# Patient Record
Sex: FEMALE | Race: WHITE | Employment: OTHER | RURAL
[De-identification: names, ages, dates, MRNs, and addresses within clinical notes are randomized per-mention and may not be internally consistent; named-entity substitution may affect disease eponyms.]

---

## 2017-01-12 RX ORDER — HYDROCODONE BITARTRATE AND ACETAMINOPHEN 5; 325 MG/1; MG/1
1 TABLET ORAL EVERY 6 HOURS PRN
Qty: 90 TABLET | Refills: 0 | Status: SHIPPED | OUTPATIENT
Start: 2017-01-12 | End: 2017-01-23

## 2017-01-13 ENCOUNTER — OUTSIDE SERVICES (OUTPATIENT)
Dept: PRIMARY CARE CLINIC | Age: 82
End: 2017-01-13
Payer: MEDICARE

## 2017-01-13 DIAGNOSIS — S72.92XD CLOSED FRACTURE OF LEFT FEMUR WITH ROUTINE HEALING, UNSPECIFIED FRACTURE MORPHOLOGY, UNSPECIFIED PORTION OF FEMUR, SUBSEQUENT ENCOUNTER: ICD-10-CM

## 2017-01-13 DIAGNOSIS — I10 ESSENTIAL HYPERTENSION: ICD-10-CM

## 2017-01-13 DIAGNOSIS — R53.81 DECLINING FUNCTIONAL STATUS: Primary | ICD-10-CM

## 2017-01-13 DIAGNOSIS — I25.10 CORONARY ARTERY DISEASE INVOLVING NATIVE HEART WITHOUT ANGINA PECTORIS, UNSPECIFIED VESSEL OR LESION TYPE: ICD-10-CM

## 2017-01-13 DIAGNOSIS — S82.401D TIBIA/FIBULA FRACTURE, RIGHT, CLOSED, WITH ROUTINE HEALING, SUBSEQUENT ENCOUNTER: ICD-10-CM

## 2017-01-13 DIAGNOSIS — J44.9 CHRONIC OBSTRUCTIVE PULMONARY DISEASE, UNSPECIFIED COPD TYPE (HCC): ICD-10-CM

## 2017-01-13 DIAGNOSIS — S82.201D TIBIA/FIBULA FRACTURE, RIGHT, CLOSED, WITH ROUTINE HEALING, SUBSEQUENT ENCOUNTER: ICD-10-CM

## 2017-01-13 DIAGNOSIS — C44.300 SKIN CANCER OF FACE: ICD-10-CM

## 2017-01-13 PROCEDURE — 99305 1ST NF CARE MODERATE MDM 35: CPT | Performed by: INTERNAL MEDICINE

## 2017-01-23 RX ORDER — HYDROCODONE BITARTRATE AND ACETAMINOPHEN 10; 325 MG/1; MG/1
1 TABLET ORAL EVERY 6 HOURS PRN
Qty: 90 TABLET | Refills: 0 | Status: SHIPPED | OUTPATIENT
Start: 2017-01-23 | End: 2017-02-15 | Stop reason: SDUPTHER

## 2017-02-15 RX ORDER — HYDROCODONE BITARTRATE AND ACETAMINOPHEN 10; 325 MG/1; MG/1
1 TABLET ORAL EVERY 6 HOURS PRN
Qty: 90 TABLET | Refills: 0 | Status: SHIPPED | OUTPATIENT
Start: 2017-02-15 | End: 2017-02-16 | Stop reason: SDUPTHER

## 2017-02-16 RX ORDER — HYDROCODONE BITARTRATE AND ACETAMINOPHEN 10; 325 MG/1; MG/1
1 TABLET ORAL EVERY 6 HOURS PRN
Qty: 90 TABLET | Refills: 0 | Status: SHIPPED | OUTPATIENT
Start: 2017-02-16 | End: 2017-04-10 | Stop reason: SDUPTHER

## 2017-02-24 ENCOUNTER — OUTSIDE SERVICES (OUTPATIENT)
Dept: PRIMARY CARE CLINIC | Age: 82
End: 2017-02-24
Payer: MEDICARE

## 2017-02-24 DIAGNOSIS — S72.92XD CLOSED FRACTURE OF LEFT FEMUR WITH ROUTINE HEALING, UNSPECIFIED FRACTURE MORPHOLOGY, UNSPECIFIED PORTION OF FEMUR, SUBSEQUENT ENCOUNTER: ICD-10-CM

## 2017-02-24 DIAGNOSIS — R53.81 DECLINING FUNCTIONAL STATUS: Primary | ICD-10-CM

## 2017-02-24 DIAGNOSIS — S82.201D TIBIA/FIBULA FRACTURE, RIGHT, CLOSED, WITH ROUTINE HEALING, SUBSEQUENT ENCOUNTER: ICD-10-CM

## 2017-02-24 DIAGNOSIS — I10 ESSENTIAL HYPERTENSION: ICD-10-CM

## 2017-02-24 DIAGNOSIS — S82.401D TIBIA/FIBULA FRACTURE, RIGHT, CLOSED, WITH ROUTINE HEALING, SUBSEQUENT ENCOUNTER: ICD-10-CM

## 2017-02-24 PROCEDURE — 99308 SBSQ NF CARE LOW MDM 20: CPT | Performed by: INTERNAL MEDICINE

## 2017-03-02 ENCOUNTER — OFFICE VISIT (OUTPATIENT)
Dept: SURGERY | Facility: CLINIC | Age: 82
End: 2017-03-02

## 2017-03-02 VITALS
WEIGHT: 103 LBS | TEMPERATURE: 97.9 F | HEART RATE: 62 BPM | DIASTOLIC BLOOD PRESSURE: 61 MMHG | HEIGHT: 65 IN | SYSTOLIC BLOOD PRESSURE: 105 MMHG | BODY MASS INDEX: 17.16 KG/M2

## 2017-03-02 DIAGNOSIS — D49.2 SKIN NEOPLASM: Primary | ICD-10-CM

## 2017-03-02 PROCEDURE — 99203 OFFICE O/P NEW LOW 30 MIN: CPT | Performed by: SURGERY

## 2017-03-02 RX ORDER — IPRATROPIUM BROMIDE AND ALBUTEROL SULFATE 2.5; .5 MG/3ML; MG/3ML
SOLUTION RESPIRATORY (INHALATION) EVERY 4 HOURS PRN
COMMUNITY
Start: 2014-12-29

## 2017-03-02 RX ORDER — GABAPENTIN 800 MG/1
TABLET ORAL
Refills: 2 | COMMUNITY
Start: 2016-12-12 | End: 2021-04-30 | Stop reason: SDUPTHER

## 2017-03-02 RX ORDER — CLOPIDOGREL BISULFATE 75 MG/1
TABLET ORAL
Refills: 5 | COMMUNITY
Start: 2016-12-12

## 2017-03-02 RX ORDER — SIMVASTATIN 20 MG
TABLET ORAL
COMMUNITY
Start: 2015-04-23

## 2017-03-02 RX ORDER — ONDANSETRON 4 MG/1
TABLET, FILM COATED ORAL EVERY 8 HOURS
COMMUNITY
Start: 2016-03-01 | End: 2017-04-21

## 2017-03-02 RX ORDER — HYDROCODONE BITARTRATE AND ACETAMINOPHEN 10; 325 MG/1; MG/1
TABLET ORAL EVERY 6 HOURS
COMMUNITY
Start: 2017-02-16 | End: 2021-04-30 | Stop reason: SDUPTHER

## 2017-03-02 NOTE — PROGRESS NOTES
Reason for Consultation: Lesion      Chief complaint   Chief Complaint   Patient presents with   • Skin Lesion     Pt here for evaluation skin cancer on left temple       Subjective .     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  It is rather large and ulcerated pigmentation.  No palpable neck lymph nodes.  Patient denies pain in the area.  No history of skin cancer in the past.    Review of Systems   Constitutional: Negative for chills, fever and unexpected weight change.   HENT: Negative for hearing loss, trouble swallowing and voice change.    Eyes: Negative for visual disturbance.   Respiratory: Positive for shortness of breath. Negative for apnea, cough, chest tightness and wheezing.    Cardiovascular: Negative for chest pain, palpitations and leg swelling.   Gastrointestinal: Negative for abdominal distention, abdominal pain, anal bleeding, blood in stool, constipation, diarrhea, nausea, rectal pain and vomiting.   Endocrine: Negative for cold intolerance and heat intolerance.   Genitourinary: Negative for difficulty urinating, dysuria and flank pain.   Musculoskeletal: Negative for back pain and gait problem.   Skin: Negative for color change, rash and wound.   Neurological: Negative for dizziness, syncope, speech difficulty, weakness, light-headedness, numbness and headaches.   Hematological: Negative for adenopathy. Does not bruise/bleed easily.   Psychiatric/Behavioral: Negative for confusion. The patient is not nervous/anxious.        History  Past Medical History   Diagnosis Date   • Cancer    • Hypertension    • Myocardial infarction    • Stroke           Past Surgical History   Procedure Laterality Date   • Colon surgery     • Leg surgery            Family History   Problem Relation Age of Onset   • Cancer Brother      prostate          Social History   Substance Use  Topics   • Smoking status: Former Smoker   • Smokeless tobacco: Never Used   • Alcohol use No    and Allergies:  Iodine    Objective     Vital Signs   Temp:  [97.9 °F (36.6 °C)] 97.9 °F (36.6 °C)  Heart Rate:  [62] 62  BP: (105)/(61) 105/61    Physical Exam:     General Appearance:    Alert, cooperative, in no acute distress   Head:    Normocephalic, without obvious abnormality, atraumatic.  Large ulcerated mass left temple.  Measures 6 x 6 cm.     Eyes:            Lids and lashes normal, conjunctivae and sclerae normal, no   icterus, no pallor, corneas clear.   Ears:    Ears appear intact with no abnormalities noted   Throat:   No oral lesions, no thrush, oral mucosa moist   Neck:   No adenopathy, supple, trachea midline, no thyromegaly, no   carotid bruit.   Extremities:   Moves all extremities well, no edema, no cyanosis, no             Redness.    Pulses:   Pulses palpable and equal bilaterally   Skin:   No bleeding, bruising or rash   Lymph nodes:   No palpable adenopathy   Neurologic:   Cranial nerves 2 - 12 grossly intact, sensation intact.       Results Review:   I reviewed the patient's new clinical results.      Assessment/Plan   Biopsy-proven basal cell carcinoma left temple, greater than 6 cm in diameter.  We'll need closure with skin graft.    I did have a detailed and extensive discussion with the patient in the hospital and they understand that they need to undergo excision of a large basal cell cancer on the left temple with full thickness skin graft.. Full risks and benefits of operative versus nonoperative intervention were discussed with the patient and these include bleeding, infection and reccurrence. The patient understands, agrees, and wishes to proceed with the surgical treatment plan as mentioned above. The patient had no questions for me at the end of the discussion.       I discussed the patients findings and my recommendations with patient and consulting provider.        Javier Aguayo,  MD  03/02/17  10:39 AM

## 2017-03-09 ENCOUNTER — TELEPHONE (OUTPATIENT)
Dept: SURGERY | Facility: CLINIC | Age: 82
End: 2017-03-09

## 2017-03-09 NOTE — TELEPHONE ENCOUNTER
----- Message from Cassie Miller sent at 3/8/2017 12:26 PM EST -----  Pt's daughter called needs to reschedule sx out 1-2 weeks.  Problem with her insurance that the daughter is working on.  Please call back to reschedule.  Thanks.

## 2017-03-11 ASSESSMENT — ENCOUNTER SYMPTOMS
BACK PAIN: 0
SINUS PRESSURE: 0
SHORTNESS OF BREATH: 1
ABDOMINAL PAIN: 0
EYE DISCHARGE: 0
COUGH: 1
SORE THROAT: 0
ROS SKIN COMMENTS: AS IN HPI
EYE DISCHARGE: 0
ABDOMINAL PAIN: 0
VOMITING: 0
VOMITING: 0
WHEEZING: 1
COUGH: 1
SHORTNESS OF BREATH: 1
WHEEZING: 1
NAUSEA: 0
SINUS PRESSURE: 0
SORE THROAT: 0
BACK PAIN: 0
NAUSEA: 0

## 2017-03-13 ENCOUNTER — OUTSIDE SERVICES (OUTPATIENT)
Dept: PRIMARY CARE CLINIC | Age: 82
End: 2017-03-13
Payer: MEDICARE

## 2017-03-13 DIAGNOSIS — R53.81 DECLINING FUNCTIONAL STATUS: Primary | ICD-10-CM

## 2017-03-13 DIAGNOSIS — I10 ESSENTIAL HYPERTENSION: ICD-10-CM

## 2017-03-13 DIAGNOSIS — I71.40 ABDOMINAL AORTIC ANEURYSM (AAA) WITHOUT RUPTURE: ICD-10-CM

## 2017-03-13 DIAGNOSIS — E55.9 VITAMIN D DEFICIENCY: ICD-10-CM

## 2017-03-13 DIAGNOSIS — S72.92XD CLOSED FRACTURE OF LEFT FEMUR WITH ROUTINE HEALING, UNSPECIFIED FRACTURE MORPHOLOGY, UNSPECIFIED PORTION OF FEMUR, SUBSEQUENT ENCOUNTER: ICD-10-CM

## 2017-03-13 PROCEDURE — 99308 SBSQ NF CARE LOW MDM 20: CPT | Performed by: INTERNAL MEDICINE

## 2017-03-14 ENCOUNTER — HOSPITAL ENCOUNTER (OUTPATIENT)
Dept: OTHER | Age: 82
Discharge: HOME | End: 2017-03-14

## 2017-03-15 LAB
BACTERIA: ABNORMAL /HPF
BILIRUBIN URINE: NEGATIVE
BLOOD, URINE: ABNORMAL
CLARITY: ABNORMAL
COLOR: YELLOW
EPITHELIAL CELLS, UA: ABNORMAL /HPF
GLUCOSE URINE: NEGATIVE MG/DL
KETONES, URINE: NEGATIVE MG/DL
LEUKOCYTE ESTERASE, URINE: ABNORMAL
MICROSCOPIC EXAMINATION: YES
NITRITE, URINE: NEGATIVE
PH UA: 7
PROTEIN UA: NEGATIVE MG/DL
RBC UA: ABNORMAL /HPF (ref 0–2)
SPECIFIC GRAVITY UA: 1.01
URINE REFLEX TO CULTURE: YES
URINE TYPE: ABNORMAL
UROBILINOGEN, URINE: 0.2 E.U./DL
WBC UA: ABNORMAL /HPF (ref 0–5)

## 2017-03-16 ENCOUNTER — HOSPITAL ENCOUNTER (OUTPATIENT)
Dept: OTHER | Age: 82
Discharge: HOME | End: 2017-03-16

## 2017-03-17 LAB
ORGANISM: ABNORMAL
URINE CULTURE, ROUTINE: ABNORMAL

## 2017-03-19 LAB
GRAM STAIN RESULT: ABNORMAL
ORGANISM: ABNORMAL
WOUND/ABSCESS: ABNORMAL

## 2017-03-27 ENCOUNTER — HOSPITAL ENCOUNTER (OUTPATIENT)
Dept: OTHER | Age: 82
Discharge: HOME | End: 2017-03-27

## 2017-03-27 LAB
A/G RATIO: 1.7 (ref 0.8–2)
ALBUMIN SERPL-MCNC: 3.8 G/DL (ref 3.4–4.8)
ALP BLD-CCNC: 89 U/L (ref 25–100)
ALT SERPL-CCNC: 12 U/L (ref 4–36)
ANION GAP SERPL CALCULATED.3IONS-SCNC: 9 MMOL/L (ref 3–16)
AST SERPL-CCNC: 16 U/L (ref 8–33)
BASOPHILS ABSOLUTE: 0 K/UL (ref 0–0.1)
BASOPHILS RELATIVE PERCENT: 0.4 %
BILIRUB SERPL-MCNC: <0.2 MG/DL (ref 0.3–1.2)
BUN BLDV-MCNC: 13 MG/DL (ref 6–20)
CALCIUM SERPL-MCNC: 9.9 MG/DL (ref 8.5–10.5)
CHLORIDE BLD-SCNC: 101 MMOL/L (ref 98–107)
CHOLESTEROL, TOTAL: 164 MG/DL (ref 0–200)
CO2: 32 MMOL/L (ref 20–30)
CREAT SERPL-MCNC: 0.6 MG/DL (ref 0.4–1.2)
EOSINOPHILS ABSOLUTE: 0.1 K/UL (ref 0–0.4)
EOSINOPHILS RELATIVE PERCENT: 1.5 %
GFR AFRICAN AMERICAN: >59
GFR NON-AFRICAN AMERICAN: >60
GLOBULIN: 2.3 G/DL
GLUCOSE BLD-MCNC: 88 MG/DL (ref 74–106)
HCT VFR BLD CALC: 35 % (ref 37–47)
HDLC SERPL-MCNC: 51 MG/DL (ref 40–60)
HEMOGLOBIN: 11.7 G/DL (ref 11.5–16.5)
LDL CHOLESTEROL CALCULATED: 95 MG/DL
LYMPHOCYTES ABSOLUTE: 2.3 K/UL (ref 1.5–4)
LYMPHOCYTES RELATIVE PERCENT: 50.3 % (ref 20–40)
MCH RBC QN AUTO: 29.9 PG (ref 27–32)
MCHC RBC AUTO-ENTMCNC: 33.4 G/DL (ref 31–35)
MCV RBC AUTO: 89.5 FL (ref 80–100)
MONOCYTES ABSOLUTE: 0.4 K/UL (ref 0.2–0.8)
MONOCYTES RELATIVE PERCENT: 8.8 % (ref 3–10)
NEUTROPHILS ABSOLUTE: 1.8 K/UL (ref 2–7.5)
NEUTROPHILS RELATIVE PERCENT: 39 %
PDW BLD-RTO: 14.5 % (ref 11–16)
PLATELET # BLD: 252 K/UL (ref 150–400)
PMV BLD AUTO: 10.5 FL (ref 6–10)
POTASSIUM SERPL-SCNC: 4.7 MMOL/L (ref 3.4–5.1)
RBC # BLD: 3.91 M/UL (ref 3.8–5.8)
SODIUM BLD-SCNC: 142 MMOL/L (ref 136–145)
TOTAL PROTEIN: 6.1 G/DL (ref 6.4–8.3)
TRIGL SERPL-MCNC: 91 MG/DL (ref 0–249)
TSH SERPL DL<=0.05 MIU/L-ACNC: 4.97 UIU/ML (ref 0.35–5.5)
VITAMIN D 25-HYDROXY: 60 (ref 32–100)
VLDLC SERPL CALC-MCNC: 18 MG/DL
WBC # BLD: 4.5 K/UL (ref 4–11)

## 2017-03-31 ENCOUNTER — HOSPITAL ENCOUNTER (OUTPATIENT)
Dept: OTHER | Age: 82
Discharge: HOME | End: 2017-03-31

## 2017-04-04 LAB
GRAM STAIN RESULT: NORMAL
WOUND/ABSCESS: NORMAL

## 2017-04-10 RX ORDER — HYDROCODONE BITARTRATE AND ACETAMINOPHEN 10; 325 MG/1; MG/1
1 TABLET ORAL EVERY 8 HOURS PRN
Qty: 45 TABLET | Refills: 0 | Status: SHIPPED | OUTPATIENT
Start: 2017-04-10 | End: 2019-01-31

## 2017-04-21 ENCOUNTER — APPOINTMENT (OUTPATIENT)
Dept: PREADMISSION TESTING | Facility: HOSPITAL | Age: 82
End: 2017-04-21

## 2017-04-21 VITALS — HEIGHT: 65 IN | BODY MASS INDEX: 17.16 KG/M2 | WEIGHT: 103 LBS

## 2017-04-25 ENCOUNTER — HOSPITAL ENCOUNTER (OUTPATIENT)
Dept: OTHER | Age: 82
Discharge: HOME | End: 2017-04-25
Attending: NURSE PRACTITIONER | Admitting: NURSE PRACTITIONER

## 2017-04-25 LAB
A/G RATIO: 1.3 (ref 0.8–2)
ALBUMIN SERPL-MCNC: 3.7 G/DL (ref 3.4–4.8)
ALP BLD-CCNC: 132 U/L (ref 25–100)
ALT SERPL-CCNC: 6 U/L (ref 4–36)
ANION GAP SERPL CALCULATED.3IONS-SCNC: 14 MMOL/L (ref 3–16)
AST SERPL-CCNC: 14 U/L (ref 8–33)
BASOPHILS ABSOLUTE: 0 K/UL (ref 0–0.1)
BASOPHILS RELATIVE PERCENT: 0.5 %
BILIRUB SERPL-MCNC: <0.2 MG/DL (ref 0.3–1.2)
BUN BLDV-MCNC: 10 MG/DL (ref 6–20)
CALCIUM SERPL-MCNC: 9.3 MG/DL (ref 8.5–10.5)
CHLORIDE BLD-SCNC: 97 MMOL/L (ref 98–107)
CHOLESTEROL, TOTAL: 179 MG/DL (ref 0–200)
CO2: 28 MMOL/L (ref 20–30)
CREAT SERPL-MCNC: 0.6 MG/DL (ref 0.4–1.2)
EOSINOPHILS ABSOLUTE: 0.1 K/UL (ref 0–0.4)
EOSINOPHILS RELATIVE PERCENT: 0.6 %
FOLATE: >20 NG/ML
GFR AFRICAN AMERICAN: >59
GFR NON-AFRICAN AMERICAN: >60
GLOBULIN: 2.9 G/DL
GLUCOSE BLD-MCNC: 118 MG/DL (ref 74–106)
HCT VFR BLD CALC: 34.9 % (ref 37–47)
HDLC SERPL-MCNC: 62 MG/DL (ref 40–60)
HEMOGLOBIN: 11.5 G/DL (ref 11.5–16.5)
LDL CHOLESTEROL CALCULATED: 93 MG/DL
LYMPHOCYTES ABSOLUTE: 2.7 K/UL (ref 1.5–4)
LYMPHOCYTES RELATIVE PERCENT: 34.9 % (ref 20–40)
MCH RBC QN AUTO: 30.2 PG (ref 27–32)
MCHC RBC AUTO-ENTMCNC: 33 G/DL (ref 31–35)
MCV RBC AUTO: 91.6 FL (ref 80–100)
MONOCYTES ABSOLUTE: 0.6 K/UL (ref 0.2–0.8)
MONOCYTES RELATIVE PERCENT: 7.9 % (ref 3–10)
NEUTROPHILS ABSOLUTE: 4.4 K/UL (ref 2–7.5)
NEUTROPHILS RELATIVE PERCENT: 56.1 %
PDW BLD-RTO: 14.9 % (ref 11–16)
PLATELET # BLD: 365 K/UL (ref 150–400)
PMV BLD AUTO: 10.6 FL (ref 6–10)
POTASSIUM SERPL-SCNC: 3.7 MMOL/L (ref 3.4–5.1)
RBC # BLD: 3.81 M/UL (ref 3.8–5.8)
SODIUM BLD-SCNC: 139 MMOL/L (ref 136–145)
TOTAL PROTEIN: 6.6 G/DL (ref 6.4–8.3)
TRIGL SERPL-MCNC: 122 MG/DL (ref 0–249)
TSH SERPL DL<=0.05 MIU/L-ACNC: 2.85 UIU/ML (ref 0.35–5.5)
VITAMIN B-12: 346 PG/ML (ref 211–911)
VLDLC SERPL CALC-MCNC: 24 MG/DL
WBC # BLD: 7.8 K/UL (ref 4–11)

## 2017-04-28 ENCOUNTER — HOSPITAL ENCOUNTER (OUTPATIENT)
Facility: HOSPITAL | Age: 82
Setting detail: HOSPITAL OUTPATIENT SURGERY
Discharge: HOME | End: 2017-04-28
Attending: SURGERY | Admitting: SURGERY

## 2017-04-28 ENCOUNTER — ANESTHESIA EVENT (OUTPATIENT)
Dept: PERIOP | Facility: HOSPITAL | Age: 82
End: 2017-04-28

## 2017-04-28 ENCOUNTER — ANESTHESIA (OUTPATIENT)
Dept: PERIOP | Facility: HOSPITAL | Age: 82
End: 2017-04-28

## 2017-04-28 VITALS
HEART RATE: 64 BPM | OXYGEN SATURATION: 94 % | DIASTOLIC BLOOD PRESSURE: 70 MMHG | TEMPERATURE: 98.2 F | RESPIRATION RATE: 16 BRPM | SYSTOLIC BLOOD PRESSURE: 163 MMHG

## 2017-04-28 DIAGNOSIS — D49.2 SKIN NEOPLASM: ICD-10-CM

## 2017-04-28 PROCEDURE — 15240 FTH/GFT F/C/C/M/N/AX/G/H/F20: CPT | Performed by: SURGERY

## 2017-04-28 PROCEDURE — 25010000002 PROPOFOL 10 MG/ML EMULSION: Performed by: NURSE ANESTHETIST, CERTIFIED REGISTERED

## 2017-04-28 PROCEDURE — S0260 H&P FOR SURGERY: HCPCS | Performed by: SURGERY

## 2017-04-28 PROCEDURE — 25010000003 CEFAZOLIN PER 500 MG: Performed by: SURGERY

## 2017-04-28 PROCEDURE — 88305 TISSUE EXAM BY PATHOLOGIST: CPT | Performed by: SURGERY

## 2017-04-28 PROCEDURE — 11646 EXC F/E/E/N/L MAL+MRG >4 CM: CPT | Performed by: SURGERY

## 2017-04-28 RX ORDER — KETAMINE HYDROCHLORIDE 50 MG/ML
INJECTION, SOLUTION, CONCENTRATE INTRAMUSCULAR; INTRAVENOUS AS NEEDED
Status: DISCONTINUED | OUTPATIENT
Start: 2017-04-28 | End: 2017-04-28 | Stop reason: SURG

## 2017-04-28 RX ORDER — LIDOCAINE HYDROCHLORIDE 10 MG/ML
INJECTION, SOLUTION INFILTRATION; PERINEURAL
Status: DISCONTINUED
Start: 2017-04-28 | End: 2017-04-28 | Stop reason: HOSPADM

## 2017-04-28 RX ORDER — SODIUM CHLORIDE 0.9 % (FLUSH) 0.9 %
3 SYRINGE (ML) INJECTION AS NEEDED
Status: DISCONTINUED | OUTPATIENT
Start: 2017-04-28 | End: 2017-04-28 | Stop reason: HOSPADM

## 2017-04-28 RX ORDER — LIDOCAINE HYDROCHLORIDE 10 MG/ML
INJECTION, SOLUTION INFILTRATION; PERINEURAL AS NEEDED
Status: DISCONTINUED | OUTPATIENT
Start: 2017-04-28 | End: 2017-04-28 | Stop reason: HOSPADM

## 2017-04-28 RX ORDER — HYDROCODONE BITARTRATE AND ACETAMINOPHEN 5; 325 MG/1; MG/1
1 TABLET ORAL ONCE AS NEEDED
Status: CANCELLED | OUTPATIENT
Start: 2017-04-28

## 2017-04-28 RX ORDER — LIDOCAINE HYDROCHLORIDE 10 MG/ML
INJECTION, SOLUTION INFILTRATION; PERINEURAL
Status: DISCONTINUED
Start: 2017-04-28 | End: 2017-04-28 | Stop reason: WASHOUT

## 2017-04-28 RX ORDER — IBUPROFEN 200 MG
TABLET ORAL AS NEEDED
Status: DISCONTINUED | OUTPATIENT
Start: 2017-04-28 | End: 2017-04-28 | Stop reason: HOSPADM

## 2017-04-28 RX ORDER — HYDROCODONE BITARTRATE AND ACETAMINOPHEN 5; 325 MG/1; MG/1
1-2 TABLET ORAL EVERY 4 HOURS PRN
Qty: 24 TABLET | Refills: 0 | Status: SHIPPED | OUTPATIENT
Start: 2017-04-28 | End: 2020-02-13

## 2017-04-28 RX ORDER — HYDROCODONE BITARTRATE AND ACETAMINOPHEN 5; 325 MG/1; MG/1
TABLET ORAL
Status: COMPLETED
Start: 2017-04-28 | End: 2017-04-28

## 2017-04-28 RX ORDER — PROPOFOL 10 MG/ML
VIAL (ML) INTRAVENOUS AS NEEDED
Status: DISCONTINUED | OUTPATIENT
Start: 2017-04-28 | End: 2017-04-28 | Stop reason: SURG

## 2017-04-28 RX ORDER — BACITRACIN, NEOMYCIN, POLYMYXIN B 400; 3.5; 5 [USP'U]/G; MG/G; [USP'U]/G
OINTMENT TOPICAL
Status: DISCONTINUED
Start: 2017-04-28 | End: 2017-04-28 | Stop reason: HOSPADM

## 2017-04-28 RX ORDER — MAGNESIUM HYDROXIDE 1200 MG/15ML
LIQUID ORAL AS NEEDED
Status: DISCONTINUED | OUTPATIENT
Start: 2017-04-28 | End: 2017-04-28 | Stop reason: HOSPADM

## 2017-04-28 RX ORDER — SODIUM CHLORIDE, SODIUM LACTATE, POTASSIUM CHLORIDE, CALCIUM CHLORIDE 600; 310; 30; 20 MG/100ML; MG/100ML; MG/100ML; MG/100ML
1000 INJECTION, SOLUTION INTRAVENOUS CONTINUOUS PRN
Status: DISCONTINUED | OUTPATIENT
Start: 2017-04-28 | End: 2017-04-28 | Stop reason: HOSPADM

## 2017-04-28 RX ADMIN — SODIUM CHLORIDE, POTASSIUM CHLORIDE, SODIUM LACTATE AND CALCIUM CHLORIDE 1000 ML: 600; 310; 30; 20 INJECTION, SOLUTION INTRAVENOUS at 06:50

## 2017-04-28 RX ADMIN — CEFAZOLIN SODIUM 2 G: 2 SOLUTION INTRAVENOUS at 07:34

## 2017-04-28 RX ADMIN — KETAMINE HYDROCHLORIDE 20 MG: 50 INJECTION, SOLUTION INTRAMUSCULAR; INTRAVENOUS at 07:34

## 2017-04-28 RX ADMIN — PROPOFOL 250 MG: 10 INJECTION, EMULSION INTRAVENOUS at 07:34

## 2017-04-28 RX ADMIN — HYDROCODONE BITARTRATE AND ACETAMINOPHEN 1 TABLET: 5; 325 TABLET ORAL at 08:38

## 2017-04-28 NOTE — ANESTHESIA PREPROCEDURE EVALUATION
Anesthesia Evaluation     Patient summary reviewed and Nursing notes reviewed   NPO Status: > 8 hours   Airway   Mallampati: I  TM distance: >3 FB  Neck ROM: full  no difficulty expected  Dental    (+) edentulous    Pulmonary - normal exam   (+) a smoker Former, COPD moderate, asthma,   Cardiovascular - normal exam    (+) hypertension, past MI  >12 months,       Neuro/Psych  (+) CVA,    GI/Hepatic/Renal/Endo - negative ROS     Musculoskeletal (-) negative ROS    Abdominal    Substance History - negative use     OB/GYN negative ob/gyn ROS         Other - negative ROS                                   Anesthesia Plan    ASA 3     MAC and general   (Risks and benefits discussed including risk of aspiration, recall and dental damage. All patient questions answered. Will continue with POC.)  intravenous induction   Anesthetic plan and risks discussed with patient.

## 2017-04-28 NOTE — ANESTHESIA POSTPROCEDURE EVALUATION
Patient: Jennifer Crabtree    Procedure Summary     Date Anesthesia Start Anesthesia Stop Room / Location    04/28/17 0728 0816  [facility]       Procedure Diagnosis Surgeon Provider    EXCISION LESION WITH SPLIT THICKNESS SKIN GRAFT LEFT TEMPLE (Left ) Skin neoplasm  (Skin neoplasm [D49.2]) MD Sergio Savage CRNA          Anesthesia Type: MAC, general  Last vitals  BP      Temp      Pulse     Resp      SpO2        Post Anesthesia Care and Evaluation    Patient location during evaluation: PHASE II  Patient participation: complete - patient participated  Level of consciousness: awake and alert  Pain management: adequate  Airway patency: patent  Anesthetic complications: No anesthetic complications  PONV Status: none  Cardiovascular status: acceptable  Respiratory status: acceptable  Hydration status: acceptable

## 2017-05-03 LAB
LAB AP CASE REPORT: NORMAL
Lab: NORMAL
PATH REPORT.FINAL DX SPEC: NORMAL

## 2017-05-04 ENCOUNTER — OFFICE VISIT (OUTPATIENT)
Dept: SURGERY | Facility: CLINIC | Age: 82
End: 2017-05-04

## 2017-05-04 DIAGNOSIS — Z48.89 POSTOPERATIVE VISIT: Primary | ICD-10-CM

## 2017-05-04 PROCEDURE — 99024 POSTOP FOLLOW-UP VISIT: CPT | Performed by: SURGERY

## 2017-05-18 ENCOUNTER — LAB REQUISITION (OUTPATIENT)
Dept: LAB | Facility: HOSPITAL | Age: 82
End: 2017-05-18

## 2017-05-18 DIAGNOSIS — N39.0 URINARY TRACT INFECTION: ICD-10-CM

## 2017-05-18 LAB
BACTERIA UR QL AUTO: ABNORMAL /HPF
BILIRUB UR QL STRIP: NEGATIVE
CLARITY UR: CLEAR
COLOR UR: YELLOW
GLUCOSE UR STRIP-MCNC: NEGATIVE MG/DL
HGB UR QL STRIP.AUTO: NEGATIVE
HYALINE CASTS UR QL AUTO: ABNORMAL /LPF
KETONES UR QL STRIP: NEGATIVE
LEUKOCYTE ESTERASE UR QL STRIP.AUTO: ABNORMAL
NITRITE UR QL STRIP: NEGATIVE
PH UR STRIP.AUTO: 7 [PH] (ref 5–8)
PROT UR QL STRIP: NEGATIVE
RBC # UR: ABNORMAL /HPF
REF LAB TEST METHOD: ABNORMAL
SP GR UR STRIP: 1.02 (ref 1–1.03)
SQUAMOUS #/AREA URNS HPF: ABNORMAL /HPF
UROBILINOGEN UR QL STRIP: ABNORMAL
WBC UR QL AUTO: ABNORMAL /HPF

## 2017-05-18 PROCEDURE — 87077 CULTURE AEROBIC IDENTIFY: CPT | Performed by: INTERNAL MEDICINE

## 2017-05-18 PROCEDURE — 87186 SC STD MICRODIL/AGAR DIL: CPT | Performed by: INTERNAL MEDICINE

## 2017-05-18 PROCEDURE — 87086 URINE CULTURE/COLONY COUNT: CPT | Performed by: INTERNAL MEDICINE

## 2017-05-18 PROCEDURE — 81001 URINALYSIS AUTO W/SCOPE: CPT | Performed by: INTERNAL MEDICINE

## 2017-05-20 LAB — BACTERIA SPEC AEROBE CULT: ABNORMAL

## 2017-08-14 ENCOUNTER — HOSPITAL ENCOUNTER (OUTPATIENT)
Dept: OTHER | Age: 82
Discharge: HOME | End: 2017-08-14
Attending: NURSE PRACTITIONER | Admitting: NURSE PRACTITIONER

## 2017-08-14 LAB
A/G RATIO: 1.5 (ref 0.8–2)
ALBUMIN SERPL-MCNC: 4.7 G/DL (ref 3.4–4.8)
ALP BLD-CCNC: 120 U/L (ref 25–100)
ALT SERPL-CCNC: 7 U/L (ref 4–36)
ANION GAP SERPL CALCULATED.3IONS-SCNC: 14 MMOL/L (ref 3–16)
AST SERPL-CCNC: 14 U/L (ref 8–33)
BASOPHILS ABSOLUTE: 0 K/UL (ref 0–0.1)
BASOPHILS RELATIVE PERCENT: 0.4 %
BILIRUB SERPL-MCNC: 0.4 MG/DL (ref 0.3–1.2)
BUN BLDV-MCNC: 19 MG/DL (ref 6–20)
CALCIUM SERPL-MCNC: 10.5 MG/DL (ref 8.5–10.5)
CHLORIDE BLD-SCNC: 99 MMOL/L (ref 98–107)
CHOLESTEROL, TOTAL: 229 MG/DL (ref 0–200)
CO2: 28 MMOL/L (ref 20–30)
CREAT SERPL-MCNC: 0.6 MG/DL (ref 0.4–1.2)
EOSINOPHILS ABSOLUTE: 0.1 K/UL (ref 0–0.4)
EOSINOPHILS RELATIVE PERCENT: 1.1 %
FOLATE: 12.84 NG/ML
GFR AFRICAN AMERICAN: >59
GFR NON-AFRICAN AMERICAN: >60
GLOBULIN: 3.2 G/DL
GLUCOSE BLD-MCNC: 89 MG/DL (ref 74–106)
HCT VFR BLD CALC: 43.5 % (ref 37–47)
HDLC SERPL-MCNC: 82 MG/DL (ref 40–60)
HEMOGLOBIN: 14.2 G/DL (ref 11.5–16.5)
LDL CHOLESTEROL CALCULATED: 119 MG/DL
LYMPHOCYTES ABSOLUTE: 3.1 K/UL (ref 1.5–4)
LYMPHOCYTES RELATIVE PERCENT: 32.9 % (ref 20–40)
MCH RBC QN AUTO: 29.2 PG (ref 27–32)
MCHC RBC AUTO-ENTMCNC: 32.6 G/DL (ref 31–35)
MCV RBC AUTO: 89.3 FL (ref 80–100)
MONOCYTES ABSOLUTE: 0.6 K/UL (ref 0.2–0.8)
MONOCYTES RELATIVE PERCENT: 6.3 % (ref 3–10)
NEUTROPHILS ABSOLUTE: 5.5 K/UL (ref 2–7.5)
NEUTROPHILS RELATIVE PERCENT: 59.3 %
PDW BLD-RTO: 14.1 % (ref 11–16)
PLATELET # BLD: 279 K/UL (ref 150–400)
PMV BLD AUTO: 11.2 FL (ref 6–10)
POTASSIUM SERPL-SCNC: 5.7 MMOL/L (ref 3.4–5.1)
RBC # BLD: 4.87 M/UL (ref 3.8–5.8)
SODIUM BLD-SCNC: 141 MMOL/L (ref 136–145)
TOTAL PROTEIN: 7.9 G/DL (ref 6.4–8.3)
TRIGL SERPL-MCNC: 138 MG/DL (ref 0–249)
TSH SERPL DL<=0.05 MIU/L-ACNC: 2.14 UIU/ML (ref 0.35–5.5)
VITAMIN B-12: 376 PG/ML (ref 211–911)
VLDLC SERPL CALC-MCNC: 28 MG/DL
WBC # BLD: 9.4 K/UL (ref 4–11)

## 2017-09-14 ENCOUNTER — TELEPHONE (OUTPATIENT)
Dept: SURGERY | Facility: CLINIC | Age: 82
End: 2017-09-14

## 2017-09-14 NOTE — TELEPHONE ENCOUNTER
Patient no showed for her appointment 09/07/17 and 08/31/17 for consult for colonoscopy . Called  patient and daughter Latanya Ramsay answered the phone and she said patient doesn't want to have colonoscopy and not reschedule the appointment. I called PCP office spoke with Laura and told her patient no showed and didn't want to have colonoscopy.

## 2018-01-24 ENCOUNTER — HOSPITAL ENCOUNTER (OUTPATIENT)
Dept: OTHER | Age: 83
Discharge: HOME | End: 2018-01-24
Attending: NURSE PRACTITIONER | Admitting: NURSE PRACTITIONER

## 2018-01-24 LAB
A/G RATIO: 1.8 (ref 0.8–2)
ALBUMIN SERPL-MCNC: 4.6 G/DL (ref 3.4–4.8)
ALP BLD-CCNC: 249 U/L (ref 25–100)
ALT SERPL-CCNC: 55 U/L (ref 4–36)
ANION GAP SERPL CALCULATED.3IONS-SCNC: 12 MMOL/L (ref 3–16)
AST SERPL-CCNC: 59 U/L (ref 8–33)
BASOPHILS ABSOLUTE: 0 K/UL (ref 0–0.1)
BASOPHILS RELATIVE PERCENT: 0.8 %
BILIRUB SERPL-MCNC: 0.4 MG/DL (ref 0.3–1.2)
BUN BLDV-MCNC: 11 MG/DL (ref 6–20)
CALCIUM SERPL-MCNC: 9.4 MG/DL (ref 8.5–10.5)
CHLORIDE BLD-SCNC: 100 MMOL/L (ref 98–107)
CHOLESTEROL, TOTAL: 211 MG/DL (ref 0–200)
CO2: 29 MMOL/L (ref 20–30)
CREAT SERPL-MCNC: 0.6 MG/DL (ref 0.4–1.2)
EOSINOPHILS ABSOLUTE: 0.1 K/UL (ref 0–0.4)
EOSINOPHILS RELATIVE PERCENT: 1.4 %
FOLATE: 11.38 NG/ML
GFR AFRICAN AMERICAN: >59
GFR NON-AFRICAN AMERICAN: >60
GLOBULIN: 2.5 G/DL
GLUCOSE BLD-MCNC: 80 MG/DL (ref 74–106)
HCT VFR BLD CALC: 44.4 % (ref 37–47)
HDLC SERPL-MCNC: 81 MG/DL (ref 40–60)
HEMOGLOBIN: 14 G/DL (ref 11.5–16.5)
IMMATURE GRANULOCYTES #: 0 K/UL
IMMATURE GRANULOCYTES %: 0.2 % (ref 0–5)
LDL CHOLESTEROL CALCULATED: 109 MG/DL
LYMPHOCYTES ABSOLUTE: 2.2 K/UL (ref 1.5–4)
LYMPHOCYTES RELATIVE PERCENT: 42.7 %
MCH RBC QN AUTO: 29.6 PG (ref 27–32)
MCHC RBC AUTO-ENTMCNC: 31.5 G/DL (ref 31–35)
MCV RBC AUTO: 93.9 FL (ref 80–100)
MONOCYTES ABSOLUTE: 0.4 K/UL (ref 0.2–0.8)
MONOCYTES RELATIVE PERCENT: 7 %
NEUTROPHILS ABSOLUTE: 2.5 K/UL (ref 2–7.5)
NEUTROPHILS RELATIVE PERCENT: 47.9 %
PDW BLD-RTO: 13.5 % (ref 11–16)
PLATELET # BLD: 267 K/UL (ref 150–400)
PMV BLD AUTO: 11.8 FL (ref 6–10)
POTASSIUM SERPL-SCNC: 4 MMOL/L (ref 3.4–5.1)
RBC # BLD: 4.73 M/UL (ref 3.8–5.8)
SODIUM BLD-SCNC: 141 MMOL/L (ref 136–145)
TOTAL PROTEIN: 7.1 G/DL (ref 6.4–8.3)
TRIGL SERPL-MCNC: 104 MG/DL (ref 0–249)
TSH SERPL DL<=0.05 MIU/L-ACNC: 2.52 UIU/ML (ref 0.35–5.5)
VITAMIN B-12: 507 PG/ML (ref 211–911)
VITAMIN D 25-HYDROXY: 54.6 (ref 32–100)
VLDLC SERPL CALC-MCNC: 21 MG/DL
WBC # BLD: 5.2 K/UL (ref 4–11)

## 2018-02-01 ENCOUNTER — HOSPITAL ENCOUNTER (OUTPATIENT)
Dept: OTHER | Age: 83
Discharge: HOME | End: 2018-02-01
Attending: NURSE PRACTITIONER | Admitting: NURSE PRACTITIONER

## 2018-02-01 LAB
HAV IGM SER IA-ACNC: NORMAL
HEPATITIS B CORE IGM ANTIBODY: NORMAL
HEPATITIS B SURFACE ANTIGEN INTERPRETATION: NORMAL
HEPATITIS C ANTIBODY INTERPRETATION: NORMAL

## 2018-05-22 ENCOUNTER — HOSPITAL ENCOUNTER (OUTPATIENT)
Dept: OTHER | Age: 83
Discharge: HOME | End: 2018-05-22

## 2018-05-22 DIAGNOSIS — M25.551 RIGHT HIP PAIN: ICD-10-CM

## 2018-05-22 DIAGNOSIS — M25.562 LEFT KNEE PAIN, UNSPECIFIED CHRONICITY: ICD-10-CM

## 2018-05-22 DIAGNOSIS — M54.5 LOW BACK PAIN, UNSPECIFIED BACK PAIN LATERALITY, UNSPECIFIED CHRONICITY, WITH SCIATICA PRESENCE UNSPECIFIED: ICD-10-CM

## 2018-07-23 ENCOUNTER — HOSPITAL ENCOUNTER (OUTPATIENT)
Facility: HOSPITAL | Age: 83
Discharge: HOME | End: 2018-07-23
Payer: COMMERCIAL

## 2018-07-23 LAB
A/G RATIO: 1.8 (ref 0.8–2)
ALBUMIN SERPL-MCNC: 4.7 G/DL (ref 3.4–4.8)
ALP BLD-CCNC: 101 U/L (ref 25–100)
ALT SERPL-CCNC: <5 U/L (ref 4–36)
ANION GAP SERPL CALCULATED.3IONS-SCNC: 16 MMOL/L (ref 3–16)
AST SERPL-CCNC: 15 U/L (ref 8–33)
BASOPHILS ABSOLUTE: 0 K/UL (ref 0–0.1)
BASOPHILS RELATIVE PERCENT: 0.6 %
BILIRUB SERPL-MCNC: 0.3 MG/DL (ref 0.3–1.2)
BUN BLDV-MCNC: 9 MG/DL (ref 6–20)
CALCIUM SERPL-MCNC: 9.7 MG/DL (ref 8.5–10.5)
CHLORIDE BLD-SCNC: 101 MMOL/L (ref 98–107)
CHOLESTEROL, TOTAL: 192 MG/DL (ref 0–200)
CO2: 25 MMOL/L (ref 20–30)
CREAT SERPL-MCNC: 0.6 MG/DL (ref 0.4–1.2)
EOSINOPHILS ABSOLUTE: 0.1 K/UL (ref 0–0.4)
EOSINOPHILS RELATIVE PERCENT: 1 %
FOLATE: 8.76 NG/ML
GFR AFRICAN AMERICAN: >59
GFR NON-AFRICAN AMERICAN: >60
GLOBULIN: 2.6 G/DL
GLUCOSE BLD-MCNC: 97 MG/DL (ref 74–106)
HCT VFR BLD CALC: 44.6 % (ref 37–47)
HDLC SERPL-MCNC: 66 MG/DL (ref 40–60)
HEMOGLOBIN: 14 G/DL (ref 11.5–16.5)
IMMATURE GRANULOCYTES #: 0 K/UL
IMMATURE GRANULOCYTES %: 0.4 % (ref 0–5)
LDL CHOLESTEROL CALCULATED: 102 MG/DL
LYMPHOCYTES ABSOLUTE: 2.4 K/UL (ref 1.5–4)
LYMPHOCYTES RELATIVE PERCENT: 33.8 %
MCH RBC QN AUTO: 29 PG (ref 27–32)
MCHC RBC AUTO-ENTMCNC: 31.4 G/DL (ref 31–35)
MCV RBC AUTO: 92.3 FL (ref 80–100)
MONOCYTES ABSOLUTE: 0.5 K/UL (ref 0.2–0.8)
MONOCYTES RELATIVE PERCENT: 7.3 %
NEUTROPHILS ABSOLUTE: 4 K/UL (ref 2–7.5)
NEUTROPHILS RELATIVE PERCENT: 56.9 %
PDW BLD-RTO: 13.6 % (ref 11–16)
PLATELET # BLD: 324 K/UL (ref 150–400)
PMV BLD AUTO: 11.3 FL (ref 6–10)
POTASSIUM SERPL-SCNC: 4.1 MMOL/L (ref 3.4–5.1)
RBC # BLD: 4.83 M/UL (ref 3.8–5.8)
SODIUM BLD-SCNC: 142 MMOL/L (ref 136–145)
TOTAL PROTEIN: 7.3 G/DL (ref 6.4–8.3)
TRIGL SERPL-MCNC: 120 MG/DL (ref 0–249)
TSH SERPL DL<=0.05 MIU/L-ACNC: 2.71 UIU/ML (ref 0.35–5.5)
VITAMIN B-12: 355 PG/ML (ref 211–911)
VITAMIN D 25-HYDROXY: 60 (ref 32–100)
VLDLC SERPL CALC-MCNC: 24 MG/DL
WBC # BLD: 7 K/UL (ref 4–11)

## 2018-07-23 PROCEDURE — 80053 COMPREHEN METABOLIC PANEL: CPT

## 2018-07-23 PROCEDURE — 85025 COMPLETE CBC W/AUTO DIFF WBC: CPT

## 2018-07-23 PROCEDURE — 82306 VITAMIN D 25 HYDROXY: CPT

## 2018-07-23 PROCEDURE — 36415 COLL VENOUS BLD VENIPUNCTURE: CPT

## 2018-07-23 PROCEDURE — 80061 LIPID PANEL: CPT

## 2018-07-23 PROCEDURE — 82746 ASSAY OF FOLIC ACID SERUM: CPT

## 2018-07-23 PROCEDURE — 82607 VITAMIN B-12: CPT

## 2018-07-23 PROCEDURE — 84443 ASSAY THYROID STIM HORMONE: CPT

## 2019-01-31 ENCOUNTER — APPOINTMENT (OUTPATIENT)
Dept: GENERAL RADIOLOGY | Facility: HOSPITAL | Age: 84
DRG: 190 | End: 2019-01-31
Payer: MEDICARE

## 2019-01-31 ENCOUNTER — HOSPITAL ENCOUNTER (INPATIENT)
Facility: HOSPITAL | Age: 84
LOS: 5 days | Discharge: HOME HEALTH | DRG: 190 | End: 2019-02-05
Attending: HOSPITALIST | Admitting: INTERNAL MEDICINE
Payer: MEDICARE

## 2019-01-31 DIAGNOSIS — J44.1 ACUTE EXACERBATION OF CHRONIC OBSTRUCTIVE PULMONARY DISEASE (COPD) (HCC): ICD-10-CM

## 2019-01-31 DIAGNOSIS — N30.00 ACUTE CYSTITIS WITHOUT HEMATURIA: Primary | ICD-10-CM

## 2019-01-31 LAB
A/G RATIO: 1 (ref 0.8–2)
ALBUMIN SERPL-MCNC: 3.7 G/DL (ref 3.4–4.8)
ALP BLD-CCNC: 90 U/L (ref 25–100)
ALT SERPL-CCNC: 16 U/L (ref 4–36)
ANION GAP SERPL CALCULATED.3IONS-SCNC: 11 MMOL/L (ref 3–16)
AST SERPL-CCNC: 43 U/L (ref 8–33)
BACTERIA: ABNORMAL /HPF
BASOPHILS ABSOLUTE: 0 K/UL (ref 0–0.1)
BASOPHILS RELATIVE PERCENT: 0.2 %
BILIRUB SERPL-MCNC: 0.4 MG/DL (ref 0.3–1.2)
BILIRUBIN URINE: NEGATIVE
BLOOD, URINE: ABNORMAL
BUN BLDV-MCNC: 26 MG/DL (ref 6–20)
CALCIUM SERPL-MCNC: 9.6 MG/DL (ref 8.5–10.5)
CHLORIDE BLD-SCNC: 97 MMOL/L (ref 98–107)
CLARITY: ABNORMAL
CO2: 30 MMOL/L (ref 20–30)
COLOR: YELLOW
CREAT SERPL-MCNC: 0.7 MG/DL (ref 0.4–1.2)
EOSINOPHILS ABSOLUTE: 0 K/UL (ref 0–0.4)
EOSINOPHILS RELATIVE PERCENT: 0.1 %
EPITHELIAL CELLS, UA: ABNORMAL /HPF
GFR AFRICAN AMERICAN: >59
GFR NON-AFRICAN AMERICAN: >60
GLOBULIN: 3.7 G/DL
GLUCOSE BLD-MCNC: 113 MG/DL (ref 74–106)
GLUCOSE URINE: NEGATIVE MG/DL
HCT VFR BLD CALC: 36.5 % (ref 37–47)
HEMOGLOBIN: 11 G/DL (ref 11.5–16.5)
IMMATURE GRANULOCYTES #: 0.1 K/UL
IMMATURE GRANULOCYTES %: 0.6 % (ref 0–5)
KETONES, URINE: 40 MG/DL
LACTIC ACID: 1.8 MMOL/L (ref 0.4–2)
LEUKOCYTE ESTERASE, URINE: ABNORMAL
LYMPHOCYTES ABSOLUTE: 1.4 K/UL (ref 1.5–4)
LYMPHOCYTES RELATIVE PERCENT: 11.6 %
MCH RBC QN AUTO: 28.4 PG (ref 27–32)
MCHC RBC AUTO-ENTMCNC: 30.1 G/DL (ref 31–35)
MCV RBC AUTO: 94.1 FL (ref 80–100)
MICROSCOPIC EXAMINATION: YES
MONOCYTES ABSOLUTE: 0.8 K/UL (ref 0.2–0.8)
MONOCYTES RELATIVE PERCENT: 6.9 %
NEUTROPHILS ABSOLUTE: 9.5 K/UL (ref 2–7.5)
NEUTROPHILS RELATIVE PERCENT: 80.6 %
NITRITE, URINE: POSITIVE
PDW BLD-RTO: 13.4 % (ref 11–16)
PH UA: 6
PLATELET # BLD: 281 K/UL (ref 150–400)
PMV BLD AUTO: 12.2 FL (ref 6–10)
POTASSIUM REFLEX MAGNESIUM: 4.4 MMOL/L (ref 3.4–5.1)
PROTEIN UA: 100 MG/DL
RAPID INFLUENZA  B AGN: NEGATIVE
RAPID INFLUENZA A AGN: NEGATIVE
RBC # BLD: 3.88 M/UL (ref 3.8–5.8)
RBC UA: ABNORMAL /HPF (ref 0–2)
SODIUM BLD-SCNC: 138 MMOL/L (ref 136–145)
SPECIFIC GRAVITY UA: 1.02
TOTAL PROTEIN: 7.4 G/DL (ref 6.4–8.3)
TROPONIN: <0.3 NG/ML
URINE REFLEX TO CULTURE: YES
URINE TYPE: ABNORMAL
UROBILINOGEN, URINE: 1 E.U./DL
WBC # BLD: 11.8 K/UL (ref 4–11)
WBC UA: ABNORMAL /HPF (ref 0–5)

## 2019-01-31 PROCEDURE — 6370000000 HC RX 637 (ALT 250 FOR IP): Performed by: INTERNAL MEDICINE

## 2019-01-31 PROCEDURE — 96365 THER/PROPH/DIAG IV INF INIT: CPT

## 2019-01-31 PROCEDURE — 6360000002 HC RX W HCPCS: Performed by: INTERNAL MEDICINE

## 2019-01-31 PROCEDURE — 36415 COLL VENOUS BLD VENIPUNCTURE: CPT

## 2019-01-31 PROCEDURE — 72170 X-RAY EXAM OF PELVIS: CPT

## 2019-01-31 PROCEDURE — 83605 ASSAY OF LACTIC ACID: CPT

## 2019-01-31 PROCEDURE — 73502 X-RAY EXAM HIP UNI 2-3 VIEWS: CPT

## 2019-01-31 PROCEDURE — 99285 EMERGENCY DEPT VISIT HI MDM: CPT

## 2019-01-31 PROCEDURE — 87077 CULTURE AEROBIC IDENTIFY: CPT

## 2019-01-31 PROCEDURE — 1200000000 HC SEMI PRIVATE

## 2019-01-31 PROCEDURE — 93005 ELECTROCARDIOGRAM TRACING: CPT

## 2019-01-31 PROCEDURE — 80053 COMPREHEN METABOLIC PANEL: CPT

## 2019-01-31 PROCEDURE — 87186 SC STD MICRODIL/AGAR DIL: CPT

## 2019-01-31 PROCEDURE — 81001 URINALYSIS AUTO W/SCOPE: CPT

## 2019-01-31 PROCEDURE — 87086 URINE CULTURE/COLONY COUNT: CPT

## 2019-01-31 PROCEDURE — 96375 TX/PRO/DX INJ NEW DRUG ADDON: CPT

## 2019-01-31 PROCEDURE — 2580000003 HC RX 258: Performed by: INTERNAL MEDICINE

## 2019-01-31 PROCEDURE — 2580000003 HC RX 258: Performed by: HOSPITALIST

## 2019-01-31 PROCEDURE — 71045 X-RAY EXAM CHEST 1 VIEW: CPT

## 2019-01-31 PROCEDURE — 6360000002 HC RX W HCPCS: Performed by: HOSPITALIST

## 2019-01-31 PROCEDURE — 87040 BLOOD CULTURE FOR BACTERIA: CPT

## 2019-01-31 PROCEDURE — 94760 N-INVAS EAR/PLS OXIMETRY 1: CPT

## 2019-01-31 PROCEDURE — 84484 ASSAY OF TROPONIN QUANT: CPT

## 2019-01-31 PROCEDURE — 87804 INFLUENZA ASSAY W/OPTIC: CPT

## 2019-01-31 PROCEDURE — 85025 COMPLETE CBC W/AUTO DIFF WBC: CPT

## 2019-01-31 RX ORDER — METHYLPREDNISOLONE SODIUM SUCCINATE 125 MG/2ML
60 INJECTION, POWDER, LYOPHILIZED, FOR SOLUTION INTRAMUSCULAR; INTRAVENOUS EVERY 8 HOURS
Status: DISCONTINUED | OUTPATIENT
Start: 2019-01-31 | End: 2019-02-03

## 2019-01-31 RX ORDER — LEVOFLOXACIN 5 MG/ML
500 INJECTION, SOLUTION INTRAVENOUS ONCE
Status: DISCONTINUED | OUTPATIENT
Start: 2019-01-31 | End: 2019-02-05 | Stop reason: HOSPADM

## 2019-01-31 RX ORDER — METHYLPREDNISOLONE SODIUM SUCCINATE 125 MG/2ML
125 INJECTION, POWDER, LYOPHILIZED, FOR SOLUTION INTRAMUSCULAR; INTRAVENOUS ONCE
Status: COMPLETED | OUTPATIENT
Start: 2019-01-31 | End: 2019-01-31

## 2019-01-31 RX ORDER — SODIUM CHLORIDE 0.9 % (FLUSH) 0.9 %
10 SYRINGE (ML) INJECTION EVERY 12 HOURS SCHEDULED
Status: DISCONTINUED | OUTPATIENT
Start: 2019-01-31 | End: 2019-02-05 | Stop reason: HOSPADM

## 2019-01-31 RX ORDER — ONDANSETRON 4 MG/1
4 TABLET, FILM COATED ORAL EVERY 8 HOURS PRN
Status: DISCONTINUED | OUTPATIENT
Start: 2019-01-31 | End: 2019-02-03

## 2019-01-31 RX ORDER — ERGOCALCIFEROL 1.25 MG/1
50000 CAPSULE ORAL WEEKLY
Status: DISCONTINUED | OUTPATIENT
Start: 2019-02-01 | End: 2019-02-05 | Stop reason: HOSPADM

## 2019-01-31 RX ORDER — IPRATROPIUM BROMIDE AND ALBUTEROL SULFATE 2.5; .5 MG/3ML; MG/3ML
1 SOLUTION RESPIRATORY (INHALATION)
Status: DISCONTINUED | OUTPATIENT
Start: 2019-01-31 | End: 2019-01-31

## 2019-01-31 RX ORDER — GUAIFENESIN 600 MG/1
600 TABLET, EXTENDED RELEASE ORAL 2 TIMES DAILY
Status: DISCONTINUED | OUTPATIENT
Start: 2019-01-31 | End: 2019-02-05 | Stop reason: HOSPADM

## 2019-01-31 RX ORDER — IPRATROPIUM BROMIDE AND ALBUTEROL SULFATE 2.5; .5 MG/3ML; MG/3ML
1 SOLUTION RESPIRATORY (INHALATION) EVERY 6 HOURS PRN
Status: DISCONTINUED | OUTPATIENT
Start: 2019-01-31 | End: 2019-02-01

## 2019-01-31 RX ORDER — PANTOPRAZOLE SODIUM 40 MG/1
40 TABLET, DELAYED RELEASE ORAL DAILY
Status: DISCONTINUED | OUTPATIENT
Start: 2019-01-31 | End: 2019-02-05 | Stop reason: HOSPADM

## 2019-01-31 RX ORDER — HYDROCODONE BITARTRATE AND ACETAMINOPHEN 10; 325 MG/1; MG/1
1 TABLET ORAL EVERY 8 HOURS PRN
Status: DISCONTINUED | OUTPATIENT
Start: 2019-01-31 | End: 2019-02-05 | Stop reason: HOSPADM

## 2019-01-31 RX ORDER — SIMVASTATIN 20 MG
20 TABLET ORAL NIGHTLY
Status: DISCONTINUED | OUTPATIENT
Start: 2019-01-31 | End: 2019-02-05 | Stop reason: HOSPADM

## 2019-01-31 RX ORDER — CLOPIDOGREL BISULFATE 75 MG/1
75 TABLET ORAL DAILY
Status: DISCONTINUED | OUTPATIENT
Start: 2019-01-31 | End: 2019-02-05 | Stop reason: HOSPADM

## 2019-01-31 RX ORDER — SODIUM CHLORIDE 0.9 % (FLUSH) 0.9 %
10 SYRINGE (ML) INJECTION PRN
Status: DISCONTINUED | OUTPATIENT
Start: 2019-01-31 | End: 2019-02-05 | Stop reason: HOSPADM

## 2019-01-31 RX ORDER — DIAZEPAM 2 MG/1
2 TABLET ORAL EVERY 12 HOURS PRN
Status: DISCONTINUED | OUTPATIENT
Start: 2019-01-31 | End: 2019-02-05 | Stop reason: HOSPADM

## 2019-01-31 RX ORDER — LEVOFLOXACIN 5 MG/ML
250 INJECTION, SOLUTION INTRAVENOUS EVERY 24 HOURS
Status: DISCONTINUED | OUTPATIENT
Start: 2019-02-01 | End: 2019-02-05 | Stop reason: HOSPADM

## 2019-01-31 RX ORDER — 0.9 % SODIUM CHLORIDE 0.9 %
500 INTRAVENOUS SOLUTION INTRAVENOUS ONCE
Status: COMPLETED | OUTPATIENT
Start: 2019-01-31 | End: 2019-01-31

## 2019-01-31 RX ORDER — ONDANSETRON 2 MG/ML
4 INJECTION INTRAMUSCULAR; INTRAVENOUS EVERY 6 HOURS PRN
Status: DISCONTINUED | OUTPATIENT
Start: 2019-01-31 | End: 2019-02-05 | Stop reason: HOSPADM

## 2019-01-31 RX ORDER — LEVOFLOXACIN 5 MG/ML
500 INJECTION, SOLUTION INTRAVENOUS ONCE
Status: COMPLETED | OUTPATIENT
Start: 2019-01-31 | End: 2019-01-31

## 2019-01-31 RX ORDER — GABAPENTIN 400 MG/1
800 CAPSULE ORAL 3 TIMES DAILY
Status: DISCONTINUED | OUTPATIENT
Start: 2019-01-31 | End: 2019-02-05 | Stop reason: HOSPADM

## 2019-01-31 RX ADMIN — GABAPENTIN 800 MG: 400 CAPSULE ORAL at 17:50

## 2019-01-31 RX ADMIN — SODIUM CHLORIDE 500 ML: 9 INJECTION, SOLUTION INTRAVENOUS at 13:36

## 2019-01-31 RX ADMIN — GUAIFENESIN 600 MG: 600 TABLET, EXTENDED RELEASE ORAL at 20:54

## 2019-01-31 RX ADMIN — ENOXAPARIN SODIUM 40 MG: 40 INJECTION SUBCUTANEOUS at 17:50

## 2019-01-31 RX ADMIN — SIMVASTATIN 20 MG: 20 TABLET, FILM COATED ORAL at 20:54

## 2019-01-31 RX ADMIN — Medication 10 ML: at 20:54

## 2019-01-31 RX ADMIN — GABAPENTIN 800 MG: 400 CAPSULE ORAL at 20:54

## 2019-01-31 RX ADMIN — LEVOFLOXACIN 500 MG: 5 INJECTION, SOLUTION INTRAVENOUS at 14:42

## 2019-01-31 RX ADMIN — METHYLPREDNISOLONE SODIUM SUCCINATE 125 MG: 125 INJECTION, POWDER, FOR SOLUTION INTRAMUSCULAR; INTRAVENOUS at 13:36

## 2019-01-31 RX ADMIN — METHYLPREDNISOLONE SODIUM SUCCINATE 60 MG: 125 INJECTION, POWDER, FOR SOLUTION INTRAMUSCULAR; INTRAVENOUS at 20:53

## 2019-01-31 RX ADMIN — HYDROCODONE BITARTRATE AND ACETAMINOPHEN 1 TABLET: 10; 325 TABLET ORAL at 17:50

## 2019-01-31 RX ADMIN — PANTOPRAZOLE SODIUM 40 MG: 40 TABLET, DELAYED RELEASE ORAL at 17:50

## 2019-01-31 ASSESSMENT — PAIN SCALES - GENERAL
PAINLEVEL_OUTOF10: 7
PAINLEVEL_OUTOF10: 7

## 2019-01-31 ASSESSMENT — PAIN DESCRIPTION - PAIN TYPE: TYPE: CHRONIC PAIN

## 2019-01-31 ASSESSMENT — PAIN DESCRIPTION - LOCATION: LOCATION: LEG;ARM

## 2019-02-01 LAB
ANION GAP SERPL CALCULATED.3IONS-SCNC: 8 MMOL/L (ref 3–16)
BUN BLDV-MCNC: 18 MG/DL (ref 6–20)
CALCIUM SERPL-MCNC: 9.5 MG/DL (ref 8.5–10.5)
CHLORIDE BLD-SCNC: 99 MMOL/L (ref 98–107)
CO2: 31 MMOL/L (ref 20–30)
CREAT SERPL-MCNC: <0.5 MG/DL (ref 0.4–1.2)
GFR AFRICAN AMERICAN: >59
GFR NON-AFRICAN AMERICAN: >60
GLUCOSE BLD-MCNC: 124 MG/DL (ref 74–106)
HCT VFR BLD CALC: 37.7 % (ref 37–47)
HEMOGLOBIN: 11.6 G/DL (ref 11.5–16.5)
MCH RBC QN AUTO: 28.7 PG (ref 27–32)
MCHC RBC AUTO-ENTMCNC: 30.8 G/DL (ref 31–35)
MCV RBC AUTO: 93.3 FL (ref 80–100)
PDW BLD-RTO: 13.2 % (ref 11–16)
PLATELET # BLD: 249 K/UL (ref 150–400)
PMV BLD AUTO: 11.9 FL (ref 6–10)
POTASSIUM SERPL-SCNC: 4.4 MMOL/L (ref 3.4–5.1)
RBC # BLD: 4.04 M/UL (ref 3.8–5.8)
SODIUM BLD-SCNC: 138 MMOL/L (ref 136–145)
WBC # BLD: 4.3 K/UL (ref 4–11)

## 2019-02-01 PROCEDURE — 94640 AIRWAY INHALATION TREATMENT: CPT

## 2019-02-01 PROCEDURE — 6360000002 HC RX W HCPCS: Performed by: INTERNAL MEDICINE

## 2019-02-01 PROCEDURE — 2700000000 HC OXYGEN THERAPY PER DAY

## 2019-02-01 PROCEDURE — 80048 BASIC METABOLIC PNL TOTAL CA: CPT

## 2019-02-01 PROCEDURE — 6370000000 HC RX 637 (ALT 250 FOR IP): Performed by: INTERNAL MEDICINE

## 2019-02-01 PROCEDURE — 94760 N-INVAS EAR/PLS OXIMETRY 1: CPT

## 2019-02-01 PROCEDURE — 2580000003 HC RX 258: Performed by: INTERNAL MEDICINE

## 2019-02-01 PROCEDURE — 36415 COLL VENOUS BLD VENIPUNCTURE: CPT

## 2019-02-01 PROCEDURE — 85027 COMPLETE CBC AUTOMATED: CPT

## 2019-02-01 PROCEDURE — 1200000000 HC SEMI PRIVATE

## 2019-02-01 RX ORDER — IPRATROPIUM BROMIDE AND ALBUTEROL SULFATE 2.5; .5 MG/3ML; MG/3ML
1 SOLUTION RESPIRATORY (INHALATION)
Status: DISCONTINUED | OUTPATIENT
Start: 2019-02-01 | End: 2019-02-02

## 2019-02-01 RX ORDER — BUDESONIDE 0.5 MG/2ML
0.5 INHALANT ORAL 2 TIMES DAILY
Status: DISCONTINUED | OUTPATIENT
Start: 2019-02-01 | End: 2019-02-05 | Stop reason: HOSPADM

## 2019-02-01 RX ORDER — NEOMYCIN SULFATE, POLYMYXIN B SULFATE, AND DEXAMETHASONE 3.5; 10000; 1 MG/G; [USP'U]/G; MG/G
OINTMENT OPHTHALMIC 3 TIMES DAILY
Status: DISCONTINUED | OUTPATIENT
Start: 2019-02-01 | End: 2019-02-05 | Stop reason: HOSPADM

## 2019-02-01 RX ADMIN — HYDROCODONE BITARTRATE AND ACETAMINOPHEN 1 TABLET: 10; 325 TABLET ORAL at 17:41

## 2019-02-01 RX ADMIN — BUDESONIDE 500 MCG: 0.5 SUSPENSION RESPIRATORY (INHALATION) at 20:40

## 2019-02-01 RX ADMIN — IPRATROPIUM BROMIDE AND ALBUTEROL SULFATE 3 ML: .5; 3 SOLUTION RESPIRATORY (INHALATION) at 20:42

## 2019-02-01 RX ADMIN — PANTOPRAZOLE SODIUM 40 MG: 40 TABLET, DELAYED RELEASE ORAL at 08:25

## 2019-02-01 RX ADMIN — GABAPENTIN 800 MG: 400 CAPSULE ORAL at 20:59

## 2019-02-01 RX ADMIN — ENOXAPARIN SODIUM 40 MG: 40 INJECTION SUBCUTANEOUS at 08:25

## 2019-02-01 RX ADMIN — Medication 10 ML: at 21:00

## 2019-02-01 RX ADMIN — ERGOCALCIFEROL 50000 UNITS: 1.25 CAPSULE ORAL at 08:25

## 2019-02-01 RX ADMIN — SIMVASTATIN 20 MG: 20 TABLET, FILM COATED ORAL at 20:59

## 2019-02-01 RX ADMIN — NEOMYCIN, POLYMYXIN B SULFATES, DEXAMETHASONE: 1; 3.5; 1 OINTMENT OPHTHALMIC at 20:59

## 2019-02-01 RX ADMIN — LEVOFLOXACIN 250 MG: 5 INJECTION, SOLUTION INTRAVENOUS at 16:00

## 2019-02-01 RX ADMIN — IPRATROPIUM BROMIDE AND ALBUTEROL SULFATE 3 ML: .5; 3 SOLUTION RESPIRATORY (INHALATION) at 16:42

## 2019-02-01 RX ADMIN — GUAIFENESIN 600 MG: 600 TABLET, EXTENDED RELEASE ORAL at 20:59

## 2019-02-01 RX ADMIN — CLOPIDOGREL BISULFATE 75 MG: 75 TABLET, FILM COATED ORAL at 08:25

## 2019-02-01 RX ADMIN — BUDESONIDE 500 MCG: 0.5 SUSPENSION RESPIRATORY (INHALATION) at 11:02

## 2019-02-01 RX ADMIN — METHYLPREDNISOLONE SODIUM SUCCINATE 60 MG: 125 INJECTION, POWDER, FOR SOLUTION INTRAMUSCULAR; INTRAVENOUS at 14:43

## 2019-02-01 RX ADMIN — IPRATROPIUM BROMIDE AND ALBUTEROL SULFATE 3 ML: .5; 3 SOLUTION RESPIRATORY (INHALATION) at 10:48

## 2019-02-01 RX ADMIN — METHYLPREDNISOLONE SODIUM SUCCINATE 60 MG: 125 INJECTION, POWDER, FOR SOLUTION INTRAMUSCULAR; INTRAVENOUS at 20:59

## 2019-02-01 RX ADMIN — GABAPENTIN 800 MG: 400 CAPSULE ORAL at 14:43

## 2019-02-01 RX ADMIN — HYDROCODONE BITARTRATE AND ACETAMINOPHEN 1 TABLET: 10; 325 TABLET ORAL at 08:28

## 2019-02-01 RX ADMIN — ASPIRIN 325 MG: 325 TABLET, DELAYED RELEASE ORAL at 08:25

## 2019-02-01 RX ADMIN — METHYLPREDNISOLONE SODIUM SUCCINATE 60 MG: 125 INJECTION, POWDER, FOR SOLUTION INTRAMUSCULAR; INTRAVENOUS at 05:38

## 2019-02-01 RX ADMIN — GUAIFENESIN 600 MG: 600 TABLET, EXTENDED RELEASE ORAL at 08:25

## 2019-02-01 RX ADMIN — GABAPENTIN 800 MG: 400 CAPSULE ORAL at 08:25

## 2019-02-01 RX ADMIN — Medication 10 ML: at 08:29

## 2019-02-01 ASSESSMENT — PAIN SCALES - GENERAL
PAINLEVEL_OUTOF10: 10
PAINLEVEL_OUTOF10: 10

## 2019-02-02 LAB
ANION GAP SERPL CALCULATED.3IONS-SCNC: 10 MMOL/L (ref 3–16)
BUN BLDV-MCNC: 15 MG/DL (ref 6–20)
CALCIUM SERPL-MCNC: 9.4 MG/DL (ref 8.5–10.5)
CHLORIDE BLD-SCNC: 101 MMOL/L (ref 98–107)
CO2: 30 MMOL/L (ref 20–30)
CREAT SERPL-MCNC: 0.6 MG/DL (ref 0.4–1.2)
GFR AFRICAN AMERICAN: >59
GFR NON-AFRICAN AMERICAN: >60
GLUCOSE BLD-MCNC: 153 MG/DL (ref 74–106)
HCT VFR BLD CALC: 34 % (ref 37–47)
HEMOGLOBIN: 10.4 G/DL (ref 11.5–16.5)
MCH RBC QN AUTO: 28.9 PG (ref 27–32)
MCHC RBC AUTO-ENTMCNC: 30.6 G/DL (ref 31–35)
MCV RBC AUTO: 94.4 FL (ref 80–100)
PDW BLD-RTO: 13.2 % (ref 11–16)
PLATELET # BLD: 248 K/UL (ref 150–400)
PMV BLD AUTO: 11.7 FL (ref 6–10)
POTASSIUM SERPL-SCNC: 4.7 MMOL/L (ref 3.4–5.1)
RBC # BLD: 3.6 M/UL (ref 3.8–5.8)
SODIUM BLD-SCNC: 141 MMOL/L (ref 136–145)
WBC # BLD: 7.9 K/UL (ref 4–11)

## 2019-02-02 PROCEDURE — 6370000000 HC RX 637 (ALT 250 FOR IP): Performed by: INTERNAL MEDICINE

## 2019-02-02 PROCEDURE — 85027 COMPLETE CBC AUTOMATED: CPT

## 2019-02-02 PROCEDURE — 6360000002 HC RX W HCPCS: Performed by: INTERNAL MEDICINE

## 2019-02-02 PROCEDURE — 94640 AIRWAY INHALATION TREATMENT: CPT

## 2019-02-02 PROCEDURE — 83540 ASSAY OF IRON: CPT

## 2019-02-02 PROCEDURE — 82746 ASSAY OF FOLIC ACID SERUM: CPT

## 2019-02-02 PROCEDURE — 1200000000 HC SEMI PRIVATE

## 2019-02-02 PROCEDURE — 82607 VITAMIN B-12: CPT

## 2019-02-02 PROCEDURE — 2580000003 HC RX 258: Performed by: INTERNAL MEDICINE

## 2019-02-02 PROCEDURE — 83550 IRON BINDING TEST: CPT

## 2019-02-02 PROCEDURE — 94760 N-INVAS EAR/PLS OXIMETRY 1: CPT

## 2019-02-02 PROCEDURE — 80048 BASIC METABOLIC PNL TOTAL CA: CPT

## 2019-02-02 PROCEDURE — 2700000000 HC OXYGEN THERAPY PER DAY

## 2019-02-02 PROCEDURE — 36415 COLL VENOUS BLD VENIPUNCTURE: CPT

## 2019-02-02 RX ORDER — IPRATROPIUM BROMIDE AND ALBUTEROL SULFATE 2.5; .5 MG/3ML; MG/3ML
1 SOLUTION RESPIRATORY (INHALATION) EVERY 4 HOURS PRN
Status: DISCONTINUED | OUTPATIENT
Start: 2019-02-02 | End: 2019-02-05 | Stop reason: HOSPADM

## 2019-02-02 RX ORDER — ARFORMOTEROL TARTRATE 15 UG/2ML
15 SOLUTION RESPIRATORY (INHALATION) 2 TIMES DAILY
Status: DISCONTINUED | OUTPATIENT
Start: 2019-02-02 | End: 2019-02-02 | Stop reason: CLARIF

## 2019-02-02 RX ORDER — BUDESONIDE 0.5 MG/2ML
0.5 INHALANT ORAL 2 TIMES DAILY
Status: DISCONTINUED | OUTPATIENT
Start: 2019-02-02 | End: 2019-02-02 | Stop reason: SDUPTHER

## 2019-02-02 RX ORDER — FORMOTEROL FUMARATE 20 UG/2ML
20 SOLUTION RESPIRATORY (INHALATION) 2 TIMES DAILY
Status: DISCONTINUED | OUTPATIENT
Start: 2019-02-02 | End: 2019-02-03

## 2019-02-02 RX ADMIN — GUAIFENESIN 600 MG: 600 TABLET, EXTENDED RELEASE ORAL at 08:43

## 2019-02-02 RX ADMIN — BUDESONIDE 500 MCG: 0.5 SUSPENSION RESPIRATORY (INHALATION) at 05:38

## 2019-02-02 RX ADMIN — GUAIFENESIN 600 MG: 600 TABLET, EXTENDED RELEASE ORAL at 21:24

## 2019-02-02 RX ADMIN — NEOMYCIN, POLYMYXIN B SULFATES, DEXAMETHASONE: 1; 3.5; 1 OINTMENT OPHTHALMIC at 08:43

## 2019-02-02 RX ADMIN — NEOMYCIN, POLYMYXIN B SULFATES, DEXAMETHASONE: 1; 3.5; 1 OINTMENT OPHTHALMIC at 15:51

## 2019-02-02 RX ADMIN — FORMOTEROL FUMARATE DIHYDRATE 20 MCG: 20 SOLUTION RESPIRATORY (INHALATION) at 13:57

## 2019-02-02 RX ADMIN — LEVOFLOXACIN 250 MG: 5 INJECTION, SOLUTION INTRAVENOUS at 15:47

## 2019-02-02 RX ADMIN — HYDROCODONE BITARTRATE AND ACETAMINOPHEN 1 TABLET: 10; 325 TABLET ORAL at 10:02

## 2019-02-02 RX ADMIN — GABAPENTIN 800 MG: 400 CAPSULE ORAL at 08:43

## 2019-02-02 RX ADMIN — GABAPENTIN 800 MG: 400 CAPSULE ORAL at 14:12

## 2019-02-02 RX ADMIN — IPRATROPIUM BROMIDE AND ALBUTEROL SULFATE 3 ML: .5; 3 SOLUTION RESPIRATORY (INHALATION) at 17:30

## 2019-02-02 RX ADMIN — Medication 10 ML: at 08:44

## 2019-02-02 RX ADMIN — SIMVASTATIN 20 MG: 20 TABLET, FILM COATED ORAL at 21:24

## 2019-02-02 RX ADMIN — METHYLPREDNISOLONE SODIUM SUCCINATE 60 MG: 125 INJECTION, POWDER, FOR SOLUTION INTRAMUSCULAR; INTRAVENOUS at 05:31

## 2019-02-02 RX ADMIN — CLOPIDOGREL BISULFATE 75 MG: 75 TABLET, FILM COATED ORAL at 08:43

## 2019-02-02 RX ADMIN — ASPIRIN 325 MG: 325 TABLET, DELAYED RELEASE ORAL at 08:43

## 2019-02-02 RX ADMIN — BUDESONIDE 500 MCG: 0.5 SUSPENSION RESPIRATORY (INHALATION) at 20:18

## 2019-02-02 RX ADMIN — IPRATROPIUM BROMIDE AND ALBUTEROL SULFATE 3 ML: .5; 3 SOLUTION RESPIRATORY (INHALATION) at 05:38

## 2019-02-02 RX ADMIN — HYDROCODONE BITARTRATE AND ACETAMINOPHEN 1 TABLET: 10; 325 TABLET ORAL at 02:03

## 2019-02-02 RX ADMIN — HYDROCODONE BITARTRATE AND ACETAMINOPHEN 1 TABLET: 10; 325 TABLET ORAL at 18:04

## 2019-02-02 RX ADMIN — IPRATROPIUM BROMIDE AND ALBUTEROL SULFATE 3 ML: .5; 3 SOLUTION RESPIRATORY (INHALATION) at 09:49

## 2019-02-02 RX ADMIN — ENOXAPARIN SODIUM 40 MG: 40 INJECTION SUBCUTANEOUS at 08:43

## 2019-02-02 RX ADMIN — NEOMYCIN, POLYMYXIN B SULFATES, DEXAMETHASONE: 1; 3.5; 1 OINTMENT OPHTHALMIC at 21:24

## 2019-02-02 RX ADMIN — GABAPENTIN 800 MG: 400 CAPSULE ORAL at 21:24

## 2019-02-02 RX ADMIN — METHYLPREDNISOLONE SODIUM SUCCINATE 60 MG: 125 INJECTION, POWDER, FOR SOLUTION INTRAMUSCULAR; INTRAVENOUS at 21:23

## 2019-02-02 RX ADMIN — Medication 10 ML: at 21:23

## 2019-02-02 RX ADMIN — PANTOPRAZOLE SODIUM 40 MG: 40 TABLET, DELAYED RELEASE ORAL at 08:43

## 2019-02-02 RX ADMIN — METHYLPREDNISOLONE SODIUM SUCCINATE 60 MG: 125 INJECTION, POWDER, FOR SOLUTION INTRAMUSCULAR; INTRAVENOUS at 14:13

## 2019-02-02 ASSESSMENT — PAIN SCALES - GENERAL
PAINLEVEL_OUTOF10: 6
PAINLEVEL_OUTOF10: 10
PAINLEVEL_OUTOF10: 10

## 2019-02-03 LAB
ANION GAP SERPL CALCULATED.3IONS-SCNC: 9 MMOL/L (ref 3–16)
BUN BLDV-MCNC: 16 MG/DL (ref 6–20)
CALCIUM SERPL-MCNC: 9.1 MG/DL (ref 8.5–10.5)
CHLORIDE BLD-SCNC: 98 MMOL/L (ref 98–107)
CO2: 33 MMOL/L (ref 20–30)
CREAT SERPL-MCNC: <0.5 MG/DL (ref 0.4–1.2)
FOLATE: 4.18 NG/ML
GFR AFRICAN AMERICAN: >59
GFR NON-AFRICAN AMERICAN: >60
GLUCOSE BLD-MCNC: 121 MG/DL (ref 74–106)
HCT VFR BLD CALC: 32.3 % (ref 37–47)
HEMOGLOBIN: 9.7 G/DL (ref 11.5–16.5)
IRON SATURATION: 15 % (ref 15–50)
IRON: 21 UG/DL (ref 37–145)
MCH RBC QN AUTO: 28.3 PG (ref 27–32)
MCHC RBC AUTO-ENTMCNC: 30 G/DL (ref 31–35)
MCV RBC AUTO: 94.2 FL (ref 80–100)
ORGANISM: ABNORMAL
PDW BLD-RTO: 13.4 % (ref 11–16)
PLATELET # BLD: 276 K/UL (ref 150–400)
PMV BLD AUTO: 11.3 FL (ref 6–10)
POTASSIUM SERPL-SCNC: 5 MMOL/L (ref 3.4–5.1)
RBC # BLD: 3.43 M/UL (ref 3.8–5.8)
SODIUM BLD-SCNC: 140 MMOL/L (ref 136–145)
TOTAL IRON BINDING CAPACITY: 142 UG/DL (ref 250–450)
URINE CULTURE, ROUTINE: ABNORMAL
URINE CULTURE, ROUTINE: ABNORMAL
VITAMIN B-12: 438 PG/ML (ref 211–911)
WBC # BLD: 5.7 K/UL (ref 4–11)

## 2019-02-03 PROCEDURE — 2700000000 HC OXYGEN THERAPY PER DAY

## 2019-02-03 PROCEDURE — 36415 COLL VENOUS BLD VENIPUNCTURE: CPT

## 2019-02-03 PROCEDURE — 6360000002 HC RX W HCPCS: Performed by: INTERNAL MEDICINE

## 2019-02-03 PROCEDURE — 6370000000 HC RX 637 (ALT 250 FOR IP): Performed by: INTERNAL MEDICINE

## 2019-02-03 PROCEDURE — 99232 SBSQ HOSP IP/OBS MODERATE 35: CPT | Performed by: PEDIATRICS

## 2019-02-03 PROCEDURE — 6370000000 HC RX 637 (ALT 250 FOR IP): Performed by: PHYSICIAN ASSISTANT

## 2019-02-03 PROCEDURE — 80048 BASIC METABOLIC PNL TOTAL CA: CPT

## 2019-02-03 PROCEDURE — 94760 N-INVAS EAR/PLS OXIMETRY 1: CPT

## 2019-02-03 PROCEDURE — 94640 AIRWAY INHALATION TREATMENT: CPT

## 2019-02-03 PROCEDURE — 85027 COMPLETE CBC AUTOMATED: CPT

## 2019-02-03 PROCEDURE — 2580000003 HC RX 258: Performed by: INTERNAL MEDICINE

## 2019-02-03 PROCEDURE — 1200000000 HC SEMI PRIVATE

## 2019-02-03 RX ORDER — FOLIC ACID 1 MG/1
1 TABLET ORAL DAILY
Status: DISCONTINUED | OUTPATIENT
Start: 2019-02-03 | End: 2019-02-05 | Stop reason: HOSPADM

## 2019-02-03 RX ORDER — FERROUS SULFATE TAB EC 324 MG (65 MG FE EQUIVALENT) 324 (65 FE) MG
324 TABLET DELAYED RESPONSE ORAL
Status: DISCONTINUED | OUTPATIENT
Start: 2019-02-04 | End: 2019-02-04

## 2019-02-03 RX ORDER — IPRATROPIUM BROMIDE AND ALBUTEROL SULFATE 2.5; .5 MG/3ML; MG/3ML
1 SOLUTION RESPIRATORY (INHALATION)
Status: DISCONTINUED | OUTPATIENT
Start: 2019-02-03 | End: 2019-02-05 | Stop reason: HOSPADM

## 2019-02-03 RX ORDER — ACETAMINOPHEN 325 MG/1
650 TABLET ORAL EVERY 4 HOURS PRN
Status: DISCONTINUED | OUTPATIENT
Start: 2019-02-03 | End: 2019-02-05 | Stop reason: HOSPADM

## 2019-02-03 RX ORDER — METHYLPREDNISOLONE SODIUM SUCCINATE 40 MG/ML
40 INJECTION, POWDER, LYOPHILIZED, FOR SOLUTION INTRAMUSCULAR; INTRAVENOUS EVERY 12 HOURS
Status: DISCONTINUED | OUTPATIENT
Start: 2019-02-04 | End: 2019-02-05 | Stop reason: HOSPADM

## 2019-02-03 RX ADMIN — GABAPENTIN 800 MG: 400 CAPSULE ORAL at 20:24

## 2019-02-03 RX ADMIN — LEVOFLOXACIN 250 MG: 5 INJECTION, SOLUTION INTRAVENOUS at 16:38

## 2019-02-03 RX ADMIN — IPRATROPIUM BROMIDE AND ALBUTEROL SULFATE 3 ML: .5; 3 SOLUTION RESPIRATORY (INHALATION) at 05:30

## 2019-02-03 RX ADMIN — SIMVASTATIN 20 MG: 20 TABLET, FILM COATED ORAL at 20:25

## 2019-02-03 RX ADMIN — GABAPENTIN 800 MG: 400 CAPSULE ORAL at 08:37

## 2019-02-03 RX ADMIN — GUAIFENESIN 600 MG: 600 TABLET, EXTENDED RELEASE ORAL at 08:37

## 2019-02-03 RX ADMIN — NEOMYCIN, POLYMYXIN B SULFATES, DEXAMETHASONE: 1; 3.5; 1 OINTMENT OPHTHALMIC at 16:42

## 2019-02-03 RX ADMIN — IPRATROPIUM BROMIDE AND ALBUTEROL SULFATE 1 AMPULE: .5; 3 SOLUTION RESPIRATORY (INHALATION) at 17:28

## 2019-02-03 RX ADMIN — IPRATROPIUM BROMIDE AND ALBUTEROL SULFATE 3 ML: .5; 3 SOLUTION RESPIRATORY (INHALATION) at 13:50

## 2019-02-03 RX ADMIN — CLOPIDOGREL BISULFATE 75 MG: 75 TABLET, FILM COATED ORAL at 08:37

## 2019-02-03 RX ADMIN — FOLIC ACID 1 MG: 1 TABLET ORAL at 16:38

## 2019-02-03 RX ADMIN — METHYLPREDNISOLONE SODIUM SUCCINATE 60 MG: 125 INJECTION, POWDER, FOR SOLUTION INTRAMUSCULAR; INTRAVENOUS at 14:11

## 2019-02-03 RX ADMIN — Medication 10 ML: at 08:39

## 2019-02-03 RX ADMIN — HYDROCODONE BITARTRATE AND ACETAMINOPHEN 1 TABLET: 10; 325 TABLET ORAL at 14:11

## 2019-02-03 RX ADMIN — BUDESONIDE 500 MCG: 0.5 SUSPENSION RESPIRATORY (INHALATION) at 05:30

## 2019-02-03 RX ADMIN — ENOXAPARIN SODIUM 40 MG: 40 INJECTION SUBCUTANEOUS at 08:38

## 2019-02-03 RX ADMIN — HYDROCODONE BITARTRATE AND ACETAMINOPHEN 1 TABLET: 10; 325 TABLET ORAL at 22:33

## 2019-02-03 RX ADMIN — NEOMYCIN, POLYMYXIN B SULFATES, DEXAMETHASONE: 1; 3.5; 1 OINTMENT OPHTHALMIC at 20:30

## 2019-02-03 RX ADMIN — IPRATROPIUM BROMIDE AND ALBUTEROL SULFATE 1 AMPULE: .5; 3 SOLUTION RESPIRATORY (INHALATION) at 20:45

## 2019-02-03 RX ADMIN — Medication 10 ML: at 20:26

## 2019-02-03 RX ADMIN — NEOMYCIN, POLYMYXIN B SULFATES, DEXAMETHASONE: 1; 3.5; 1 OINTMENT OPHTHALMIC at 08:38

## 2019-02-03 RX ADMIN — HYDROCODONE BITARTRATE AND ACETAMINOPHEN 1 TABLET: 10; 325 TABLET ORAL at 06:09

## 2019-02-03 RX ADMIN — GABAPENTIN 800 MG: 400 CAPSULE ORAL at 14:11

## 2019-02-03 RX ADMIN — BUDESONIDE 500 MCG: 0.5 SUSPENSION RESPIRATORY (INHALATION) at 17:29

## 2019-02-03 RX ADMIN — ASPIRIN 325 MG: 325 TABLET, DELAYED RELEASE ORAL at 08:37

## 2019-02-03 RX ADMIN — PANTOPRAZOLE SODIUM 40 MG: 40 TABLET, DELAYED RELEASE ORAL at 08:37

## 2019-02-03 RX ADMIN — METHYLPREDNISOLONE SODIUM SUCCINATE 60 MG: 125 INJECTION, POWDER, FOR SOLUTION INTRAMUSCULAR; INTRAVENOUS at 06:06

## 2019-02-03 RX ADMIN — GUAIFENESIN 600 MG: 600 TABLET, EXTENDED RELEASE ORAL at 20:25

## 2019-02-03 ASSESSMENT — PAIN SCALES - GENERAL
PAINLEVEL_OUTOF10: 8
PAINLEVEL_OUTOF10: 8
PAINLEVEL_OUTOF10: 10

## 2019-02-04 LAB
ANION GAP SERPL CALCULATED.3IONS-SCNC: 10 MMOL/L (ref 3–16)
BUN BLDV-MCNC: 17 MG/DL (ref 6–20)
CALCIUM SERPL-MCNC: 9.2 MG/DL (ref 8.5–10.5)
CHLORIDE BLD-SCNC: 96 MMOL/L (ref 98–107)
CO2: 35 MMOL/L (ref 20–30)
CREAT SERPL-MCNC: 0.6 MG/DL (ref 0.4–1.2)
GFR AFRICAN AMERICAN: >59
GFR NON-AFRICAN AMERICAN: >60
GLUCOSE BLD-MCNC: 109 MG/DL (ref 74–106)
HCT VFR BLD CALC: 36.1 % (ref 37–47)
HEMOGLOBIN: 10.8 G/DL (ref 11.5–16.5)
MCH RBC QN AUTO: 28.3 PG (ref 27–32)
MCHC RBC AUTO-ENTMCNC: 29.9 G/DL (ref 31–35)
MCV RBC AUTO: 94.5 FL (ref 80–100)
PDW BLD-RTO: 13.7 % (ref 11–16)
PLATELET # BLD: 306 K/UL (ref 150–400)
PMV BLD AUTO: 10.5 FL (ref 6–10)
POTASSIUM SERPL-SCNC: 5 MMOL/L (ref 3.4–5.1)
RBC # BLD: 3.82 M/UL (ref 3.8–5.8)
SODIUM BLD-SCNC: 141 MMOL/L (ref 136–145)
TSH SERPL DL<=0.05 MIU/L-ACNC: 2.76 UIU/ML (ref 0.35–5.5)
VITAMIN D 25-HYDROXY: 41.8 (ref 32–100)
WBC # BLD: 8.1 K/UL (ref 4–11)

## 2019-02-04 PROCEDURE — 6360000002 HC RX W HCPCS: Performed by: INTERNAL MEDICINE

## 2019-02-04 PROCEDURE — 6370000000 HC RX 637 (ALT 250 FOR IP): Performed by: NURSE PRACTITIONER

## 2019-02-04 PROCEDURE — 97803 MED NUTRITION INDIV SUBSEQ: CPT

## 2019-02-04 PROCEDURE — 80048 BASIC METABOLIC PNL TOTAL CA: CPT

## 2019-02-04 PROCEDURE — 97802 MEDICAL NUTRITION INDIV IN: CPT

## 2019-02-04 PROCEDURE — 85027 COMPLETE CBC AUTOMATED: CPT

## 2019-02-04 PROCEDURE — 1200000000 HC SEMI PRIVATE

## 2019-02-04 PROCEDURE — 99232 SBSQ HOSP IP/OBS MODERATE 35: CPT | Performed by: PEDIATRICS

## 2019-02-04 PROCEDURE — 6370000000 HC RX 637 (ALT 250 FOR IP): Performed by: PHYSICIAN ASSISTANT

## 2019-02-04 PROCEDURE — 6370000000 HC RX 637 (ALT 250 FOR IP): Performed by: INTERNAL MEDICINE

## 2019-02-04 PROCEDURE — 97530 THERAPEUTIC ACTIVITIES: CPT

## 2019-02-04 PROCEDURE — 97165 OT EVAL LOW COMPLEX 30 MIN: CPT

## 2019-02-04 PROCEDURE — 82306 VITAMIN D 25 HYDROXY: CPT

## 2019-02-04 PROCEDURE — 94640 AIRWAY INHALATION TREATMENT: CPT

## 2019-02-04 PROCEDURE — 2580000003 HC RX 258: Performed by: INTERNAL MEDICINE

## 2019-02-04 PROCEDURE — 2700000000 HC OXYGEN THERAPY PER DAY

## 2019-02-04 PROCEDURE — 97161 PT EVAL LOW COMPLEX 20 MIN: CPT

## 2019-02-04 PROCEDURE — 84443 ASSAY THYROID STIM HORMONE: CPT

## 2019-02-04 PROCEDURE — 6360000002 HC RX W HCPCS: Performed by: PHYSICIAN ASSISTANT

## 2019-02-04 PROCEDURE — 94760 N-INVAS EAR/PLS OXIMETRY 1: CPT

## 2019-02-04 PROCEDURE — 36415 COLL VENOUS BLD VENIPUNCTURE: CPT

## 2019-02-04 RX ORDER — FERROUS SULFATE TAB EC 324 MG (65 MG FE EQUIVALENT) 324 (65 FE) MG
324 TABLET DELAYED RESPONSE ORAL 2 TIMES DAILY WITH MEALS
Status: DISCONTINUED | OUTPATIENT
Start: 2019-02-04 | End: 2019-02-05 | Stop reason: HOSPADM

## 2019-02-04 RX ADMIN — GABAPENTIN 800 MG: 400 CAPSULE ORAL at 20:57

## 2019-02-04 RX ADMIN — SIMVASTATIN 20 MG: 20 TABLET, FILM COATED ORAL at 20:57

## 2019-02-04 RX ADMIN — Medication 10 ML: at 08:15

## 2019-02-04 RX ADMIN — IPRATROPIUM BROMIDE AND ALBUTEROL SULFATE 1 AMPULE: .5; 3 SOLUTION RESPIRATORY (INHALATION) at 17:41

## 2019-02-04 RX ADMIN — BUDESONIDE 500 MCG: 0.5 SUSPENSION RESPIRATORY (INHALATION) at 05:35

## 2019-02-04 RX ADMIN — METHYLPREDNISOLONE SODIUM SUCCINATE 40 MG: 40 INJECTION, POWDER, FOR SOLUTION INTRAMUSCULAR; INTRAVENOUS at 02:03

## 2019-02-04 RX ADMIN — DIAZEPAM 2 MG: 2 TABLET ORAL at 21:10

## 2019-02-04 RX ADMIN — Medication 10 ML: at 20:58

## 2019-02-04 RX ADMIN — BUDESONIDE 500 MCG: 0.5 SUSPENSION RESPIRATORY (INHALATION) at 17:40

## 2019-02-04 RX ADMIN — GUAIFENESIN 600 MG: 600 TABLET, EXTENDED RELEASE ORAL at 08:15

## 2019-02-04 RX ADMIN — FERROUS SULFATE TAB EC 324 MG (65 MG FE EQUIVALENT) 324 MG: 324 (65 FE) TABLET DELAYED RESPONSE at 16:07

## 2019-02-04 RX ADMIN — CLOPIDOGREL BISULFATE 75 MG: 75 TABLET, FILM COATED ORAL at 08:15

## 2019-02-04 RX ADMIN — GABAPENTIN 800 MG: 400 CAPSULE ORAL at 15:19

## 2019-02-04 RX ADMIN — MAGNESIUM HYDROXIDE 30 ML: 400 SUSPENSION ORAL at 10:42

## 2019-02-04 RX ADMIN — GABAPENTIN 800 MG: 400 CAPSULE ORAL at 08:15

## 2019-02-04 RX ADMIN — ASPIRIN 325 MG: 325 TABLET, DELAYED RELEASE ORAL at 08:15

## 2019-02-04 RX ADMIN — GUAIFENESIN 600 MG: 600 TABLET, EXTENDED RELEASE ORAL at 20:57

## 2019-02-04 RX ADMIN — HYDROCODONE BITARTRATE AND ACETAMINOPHEN 1 TABLET: 10; 325 TABLET ORAL at 18:40

## 2019-02-04 RX ADMIN — HYDROCODONE BITARTRATE AND ACETAMINOPHEN 1 TABLET: 10; 325 TABLET ORAL at 08:15

## 2019-02-04 RX ADMIN — IPRATROPIUM BROMIDE AND ALBUTEROL SULFATE 1 AMPULE: .5; 3 SOLUTION RESPIRATORY (INHALATION) at 14:16

## 2019-02-04 RX ADMIN — LEVOFLOXACIN 250 MG: 5 INJECTION, SOLUTION INTRAVENOUS at 16:07

## 2019-02-04 RX ADMIN — ENOXAPARIN SODIUM 40 MG: 40 INJECTION SUBCUTANEOUS at 08:14

## 2019-02-04 RX ADMIN — METHYLPREDNISOLONE SODIUM SUCCINATE 40 MG: 40 INJECTION, POWDER, FOR SOLUTION INTRAMUSCULAR; INTRAVENOUS at 15:19

## 2019-02-04 RX ADMIN — PANTOPRAZOLE SODIUM 40 MG: 40 TABLET, DELAYED RELEASE ORAL at 08:16

## 2019-02-04 RX ADMIN — FOLIC ACID 1 MG: 1 TABLET ORAL at 08:16

## 2019-02-04 RX ADMIN — FERROUS SULFATE TAB EC 324 MG (65 MG FE EQUIVALENT) 324 MG: 324 (65 FE) TABLET DELAYED RESPONSE at 08:16

## 2019-02-04 RX ADMIN — NEOMYCIN, POLYMYXIN B SULFATES, DEXAMETHASONE: 1; 3.5; 1 OINTMENT OPHTHALMIC at 08:15

## 2019-02-04 RX ADMIN — NEOMYCIN, POLYMYXIN B SULFATES, DEXAMETHASONE: 1; 3.5; 1 OINTMENT OPHTHALMIC at 20:57

## 2019-02-04 RX ADMIN — IPRATROPIUM BROMIDE AND ALBUTEROL SULFATE 1 AMPULE: .5; 3 SOLUTION RESPIRATORY (INHALATION) at 05:34

## 2019-02-04 ASSESSMENT — PAIN SCALES - GENERAL
PAINLEVEL_OUTOF10: 8
PAINLEVEL_OUTOF10: 8

## 2019-02-05 VITALS
WEIGHT: 90.8 LBS | SYSTOLIC BLOOD PRESSURE: 130 MMHG | TEMPERATURE: 98.8 F | HEIGHT: 65 IN | HEART RATE: 87 BPM | RESPIRATION RATE: 18 BRPM | DIASTOLIC BLOOD PRESSURE: 77 MMHG | OXYGEN SATURATION: 97 % | BODY MASS INDEX: 15.13 KG/M2

## 2019-02-05 LAB
ANION GAP SERPL CALCULATED.3IONS-SCNC: 7 MMOL/L (ref 3–16)
BLOOD CULTURE, ROUTINE: NORMAL
BUN BLDV-MCNC: 19 MG/DL (ref 6–20)
CALCIUM SERPL-MCNC: 8.9 MG/DL (ref 8.5–10.5)
CHLORIDE BLD-SCNC: 94 MMOL/L (ref 98–107)
CO2: 36 MMOL/L (ref 20–30)
CREAT SERPL-MCNC: <0.5 MG/DL (ref 0.4–1.2)
CULTURE, BLOOD 2: NORMAL
GFR AFRICAN AMERICAN: >59
GFR NON-AFRICAN AMERICAN: >60
GLUCOSE BLD-MCNC: 112 MG/DL (ref 74–106)
HCT VFR BLD CALC: 36.7 % (ref 37–47)
HEMOGLOBIN: 11.1 G/DL (ref 11.5–16.5)
MCH RBC QN AUTO: 28.2 PG (ref 27–32)
MCHC RBC AUTO-ENTMCNC: 30.2 G/DL (ref 31–35)
MCV RBC AUTO: 93.4 FL (ref 80–100)
PDW BLD-RTO: 13.7 % (ref 11–16)
PLATELET # BLD: 351 K/UL (ref 150–400)
PMV BLD AUTO: 10.3 FL (ref 6–10)
POTASSIUM SERPL-SCNC: 4.3 MMOL/L (ref 3.4–5.1)
RBC # BLD: 3.93 M/UL (ref 3.8–5.8)
SODIUM BLD-SCNC: 137 MMOL/L (ref 136–145)
WBC # BLD: 7.3 K/UL (ref 4–11)

## 2019-02-05 PROCEDURE — 6360000002 HC RX W HCPCS: Performed by: INTERNAL MEDICINE

## 2019-02-05 PROCEDURE — 6370000000 HC RX 637 (ALT 250 FOR IP): Performed by: PHYSICIAN ASSISTANT

## 2019-02-05 PROCEDURE — 2580000003 HC RX 258: Performed by: INTERNAL MEDICINE

## 2019-02-05 PROCEDURE — 80048 BASIC METABOLIC PNL TOTAL CA: CPT

## 2019-02-05 PROCEDURE — 6370000000 HC RX 637 (ALT 250 FOR IP): Performed by: INTERNAL MEDICINE

## 2019-02-05 PROCEDURE — 6360000002 HC RX W HCPCS: Performed by: PHYSICIAN ASSISTANT

## 2019-02-05 PROCEDURE — 94640 AIRWAY INHALATION TREATMENT: CPT

## 2019-02-05 PROCEDURE — 36415 COLL VENOUS BLD VENIPUNCTURE: CPT

## 2019-02-05 PROCEDURE — 2700000000 HC OXYGEN THERAPY PER DAY

## 2019-02-05 PROCEDURE — 94760 N-INVAS EAR/PLS OXIMETRY 1: CPT

## 2019-02-05 PROCEDURE — 99238 HOSP IP/OBS DSCHRG MGMT 30/<: CPT | Performed by: INTERNAL MEDICINE

## 2019-02-05 PROCEDURE — 6370000000 HC RX 637 (ALT 250 FOR IP): Performed by: NURSE PRACTITIONER

## 2019-02-05 PROCEDURE — 85027 COMPLETE CBC AUTOMATED: CPT

## 2019-02-05 RX ORDER — FLUTICASONE FUROATE AND VILANTEROL 200; 25 UG/1; UG/1
1 POWDER RESPIRATORY (INHALATION) DAILY
COMMUNITY

## 2019-02-05 RX ORDER — FOLIC ACID 1 MG/1
1 TABLET ORAL DAILY
Qty: 30 TABLET | Refills: 0 | Status: SHIPPED | OUTPATIENT
Start: 2019-02-06 | End: 2021-04-29

## 2019-02-05 RX ORDER — FERROUS SULFATE TAB EC 324 MG (65 MG FE EQUIVALENT) 324 (65 FE) MG
324 TABLET DELAYED RESPONSE ORAL 2 TIMES DAILY WITH MEALS
Qty: 60 TABLET | Refills: 0 | Status: SHIPPED | OUTPATIENT
Start: 2019-02-05 | End: 2021-04-29

## 2019-02-05 RX ORDER — HYDROCODONE BITARTRATE AND ACETAMINOPHEN 10; 325 MG/1; MG/1
1 TABLET ORAL 3 TIMES DAILY PRN
COMMUNITY

## 2019-02-05 RX ORDER — LISINOPRIL 5 MG/1
5 TABLET ORAL DAILY
COMMUNITY

## 2019-02-05 RX ORDER — GUAIFENESIN 600 MG/1
600 TABLET, EXTENDED RELEASE ORAL 2 TIMES DAILY
Qty: 20 TABLET | Refills: 0 | Status: SHIPPED | OUTPATIENT
Start: 2019-02-05 | End: 2020-09-21

## 2019-02-05 RX ORDER — IPRATROPIUM BROMIDE AND ALBUTEROL SULFATE 2.5; .5 MG/3ML; MG/3ML
1 SOLUTION RESPIRATORY (INHALATION) EVERY 6 HOURS PRN
Qty: 30 VIAL | Refills: 0 | Status: SHIPPED | OUTPATIENT
Start: 2019-02-05 | End: 2022-03-29

## 2019-02-05 RX ORDER — LEVOFLOXACIN 500 MG/1
500 TABLET, FILM COATED ORAL DAILY
Qty: 5 TABLET | Refills: 0 | Status: SHIPPED | OUTPATIENT
Start: 2019-02-05 | End: 2019-02-10

## 2019-02-05 RX ORDER — PREDNISONE 10 MG/1
TABLET ORAL
Qty: 18 TABLET | Refills: 0 | Status: SHIPPED | OUTPATIENT
Start: 2019-02-05 | End: 2020-09-21

## 2019-02-05 RX ADMIN — IPRATROPIUM BROMIDE AND ALBUTEROL SULFATE 1 AMPULE: .5; 3 SOLUTION RESPIRATORY (INHALATION) at 05:22

## 2019-02-05 RX ADMIN — IPRATROPIUM BROMIDE AND ALBUTEROL SULFATE 1 AMPULE: .5; 3 SOLUTION RESPIRATORY (INHALATION) at 14:43

## 2019-02-05 RX ADMIN — IPRATROPIUM BROMIDE AND ALBUTEROL SULFATE 1 AMPULE: .5; 3 SOLUTION RESPIRATORY (INHALATION) at 10:53

## 2019-02-05 RX ADMIN — FOLIC ACID 1 MG: 1 TABLET ORAL at 08:46

## 2019-02-05 RX ADMIN — LEVOFLOXACIN 250 MG: 5 INJECTION, SOLUTION INTRAVENOUS at 14:17

## 2019-02-05 RX ADMIN — HYDROCODONE BITARTRATE AND ACETAMINOPHEN 1 TABLET: 10; 325 TABLET ORAL at 10:34

## 2019-02-05 RX ADMIN — FERROUS SULFATE TAB EC 324 MG (65 MG FE EQUIVALENT) 324 MG: 324 (65 FE) TABLET DELAYED RESPONSE at 08:46

## 2019-02-05 RX ADMIN — METHYLPREDNISOLONE SODIUM SUCCINATE 40 MG: 40 INJECTION, POWDER, FOR SOLUTION INTRAMUSCULAR; INTRAVENOUS at 02:30

## 2019-02-05 RX ADMIN — BUDESONIDE 500 MCG: 0.5 SUSPENSION RESPIRATORY (INHALATION) at 05:22

## 2019-02-05 RX ADMIN — Medication 10 ML: at 08:45

## 2019-02-05 RX ADMIN — HYDROCODONE BITARTRATE AND ACETAMINOPHEN 1 TABLET: 10; 325 TABLET ORAL at 02:29

## 2019-02-05 RX ADMIN — ASPIRIN 325 MG: 325 TABLET, DELAYED RELEASE ORAL at 08:45

## 2019-02-05 RX ADMIN — CLOPIDOGREL BISULFATE 75 MG: 75 TABLET, FILM COATED ORAL at 08:46

## 2019-02-05 RX ADMIN — GABAPENTIN 800 MG: 400 CAPSULE ORAL at 14:17

## 2019-02-05 RX ADMIN — GABAPENTIN 800 MG: 400 CAPSULE ORAL at 08:46

## 2019-02-05 RX ADMIN — ENOXAPARIN SODIUM 40 MG: 40 INJECTION SUBCUTANEOUS at 08:45

## 2019-02-05 RX ADMIN — GUAIFENESIN 600 MG: 600 TABLET, EXTENDED RELEASE ORAL at 08:45

## 2019-02-05 RX ADMIN — PANTOPRAZOLE SODIUM 40 MG: 40 TABLET, DELAYED RELEASE ORAL at 08:45

## 2019-02-05 RX ADMIN — METHYLPREDNISOLONE SODIUM SUCCINATE 40 MG: 40 INJECTION, POWDER, FOR SOLUTION INTRAMUSCULAR; INTRAVENOUS at 14:17

## 2019-02-05 ASSESSMENT — PAIN SCALES - GENERAL
PAINLEVEL_OUTOF10: 10
PAINLEVEL_OUTOF10: 8

## 2019-02-12 ENCOUNTER — HOSPITAL ENCOUNTER (OUTPATIENT)
Dept: ULTRASOUND IMAGING | Facility: HOSPITAL | Age: 84
Discharge: HOME | End: 2019-02-12
Payer: MEDICARE

## 2019-02-12 DIAGNOSIS — I71.40 ABDOMINAL AORTIC ANEURYSM WITHOUT RUPTURE: ICD-10-CM

## 2019-02-12 PROCEDURE — 76775 US EXAM ABDO BACK WALL LIM: CPT

## 2019-03-03 ENCOUNTER — HOSPITAL ENCOUNTER (EMERGENCY)
Facility: HOSPITAL | Age: 84
Discharge: HOME | End: 2019-03-03
Attending: FAMILY MEDICINE
Payer: MEDICARE

## 2019-03-03 VITALS
BODY MASS INDEX: 14.99 KG/M2 | DIASTOLIC BLOOD PRESSURE: 83 MMHG | TEMPERATURE: 97.5 F | HEART RATE: 98 BPM | HEIGHT: 65 IN | SYSTOLIC BLOOD PRESSURE: 119 MMHG | WEIGHT: 90 LBS | OXYGEN SATURATION: 91 % | RESPIRATION RATE: 16 BRPM

## 2019-03-03 DIAGNOSIS — M25.551 RIGHT HIP PAIN: Primary | ICD-10-CM

## 2019-03-03 PROCEDURE — 99283 EMERGENCY DEPT VISIT LOW MDM: CPT

## 2019-03-03 RX ORDER — HYDROCODONE BITARTRATE AND ACETAMINOPHEN 7.5; 325 MG/1; MG/1
1 TABLET ORAL EVERY 6 HOURS PRN
Qty: 6 TABLET | Refills: 0 | Status: SHIPPED | OUTPATIENT
Start: 2019-03-03 | End: 2019-03-06

## 2019-03-03 ASSESSMENT — PAIN DESCRIPTION - LOCATION: LOCATION: HIP

## 2019-03-03 ASSESSMENT — PAIN SCALES - GENERAL: PAINLEVEL_OUTOF10: 10

## 2019-03-03 ASSESSMENT — PAIN DESCRIPTION - ORIENTATION: ORIENTATION: RIGHT

## 2019-03-03 ASSESSMENT — PAIN DESCRIPTION - PAIN TYPE: TYPE: ACUTE PAIN

## 2019-03-03 ASSESSMENT — PAIN DESCRIPTION - DESCRIPTORS: DESCRIPTORS: ACHING

## 2019-03-03 ASSESSMENT — PAIN DESCRIPTION - FREQUENCY: FREQUENCY: CONTINUOUS

## 2019-08-02 ENCOUNTER — HOSPITAL ENCOUNTER (OUTPATIENT)
Facility: HOSPITAL | Age: 84
Discharge: HOME | End: 2019-08-02
Payer: MEDICARE

## 2019-08-02 LAB
A/G RATIO: 2.1 (ref 0.8–2)
ALBUMIN SERPL-MCNC: 5 G/DL (ref 3.4–4.8)
ALP BLD-CCNC: 97 U/L (ref 25–100)
ALT SERPL-CCNC: 11 U/L (ref 4–36)
ANION GAP SERPL CALCULATED.3IONS-SCNC: 15 MMOL/L (ref 3–16)
AST SERPL-CCNC: 18 U/L (ref 8–33)
BASOPHILS ABSOLUTE: 0 K/UL (ref 0–0.1)
BASOPHILS RELATIVE PERCENT: 0.6 %
BILIRUB SERPL-MCNC: 0.4 MG/DL (ref 0.3–1.2)
BUN BLDV-MCNC: 13 MG/DL (ref 6–20)
CALCIUM SERPL-MCNC: 9.7 MG/DL (ref 8.5–10.5)
CHLORIDE BLD-SCNC: 96 MMOL/L (ref 98–107)
CHOLESTEROL, TOTAL: 211 MG/DL (ref 0–200)
CO2: 28 MMOL/L (ref 20–30)
CREAT SERPL-MCNC: 0.7 MG/DL (ref 0.4–1.2)
EOSINOPHILS ABSOLUTE: 0.1 K/UL (ref 0–0.4)
EOSINOPHILS RELATIVE PERCENT: 0.9 %
FOLATE: 10.48 NG/ML
GFR AFRICAN AMERICAN: >59
GFR NON-AFRICAN AMERICAN: >60
GLOBULIN: 2.4 G/DL
GLUCOSE BLD-MCNC: 97 MG/DL (ref 74–106)
HCT VFR BLD CALC: 43 % (ref 37–47)
HDLC SERPL-MCNC: 81 MG/DL (ref 40–60)
HEMOGLOBIN: 13.5 G/DL (ref 11.5–16.5)
IMMATURE GRANULOCYTES #: 0 K/UL
IMMATURE GRANULOCYTES %: 0.1 % (ref 0–5)
LDL CHOLESTEROL CALCULATED: 104 MG/DL
LYMPHOCYTES ABSOLUTE: 2.8 K/UL (ref 1.5–4)
LYMPHOCYTES RELATIVE PERCENT: 40 %
MCH RBC QN AUTO: 29.2 PG (ref 27–32)
MCHC RBC AUTO-ENTMCNC: 31.4 G/DL (ref 31–35)
MCV RBC AUTO: 93.1 FL (ref 80–100)
MONOCYTES ABSOLUTE: 0.5 K/UL (ref 0.2–0.8)
MONOCYTES RELATIVE PERCENT: 7 %
NEUTROPHILS ABSOLUTE: 3.6 K/UL (ref 2–7.5)
NEUTROPHILS RELATIVE PERCENT: 51.4 %
PDW BLD-RTO: 13.6 % (ref 11–16)
PLATELET # BLD: 248 K/UL (ref 150–400)
PMV BLD AUTO: 11.3 FL (ref 6–10)
POTASSIUM SERPL-SCNC: 4 MMOL/L (ref 3.4–5.1)
RBC # BLD: 4.62 M/UL (ref 3.8–5.8)
SODIUM BLD-SCNC: 139 MMOL/L (ref 136–145)
TOTAL PROTEIN: 7.4 G/DL (ref 6.4–8.3)
TRIGL SERPL-MCNC: 129 MG/DL (ref 0–249)
TSH SERPL DL<=0.05 MIU/L-ACNC: 8.47 UIU/ML (ref 0.35–5.5)
VITAMIN B-12: 360 PG/ML (ref 211–911)
VLDLC SERPL CALC-MCNC: 26 MG/DL
WBC # BLD: 6.9 K/UL (ref 4–11)

## 2019-08-02 PROCEDURE — 82746 ASSAY OF FOLIC ACID SERUM: CPT

## 2019-08-02 PROCEDURE — 84443 ASSAY THYROID STIM HORMONE: CPT

## 2019-08-02 PROCEDURE — 82607 VITAMIN B-12: CPT

## 2019-08-02 PROCEDURE — 80053 COMPREHEN METABOLIC PANEL: CPT

## 2019-08-02 PROCEDURE — 80061 LIPID PANEL: CPT

## 2019-08-02 PROCEDURE — 85025 COMPLETE CBC W/AUTO DIFF WBC: CPT

## 2020-01-24 ENCOUNTER — HOSPITAL ENCOUNTER (OUTPATIENT)
Facility: HOSPITAL | Age: 85
Discharge: HOME | End: 2020-01-24
Payer: MEDICARE

## 2020-01-24 LAB
A/G RATIO: 1.6 (ref 0.8–2)
ALBUMIN SERPL-MCNC: 4.3 G/DL (ref 3.4–4.8)
ALP BLD-CCNC: 112 U/L (ref 25–100)
ALT SERPL-CCNC: <5 U/L (ref 4–36)
ANION GAP SERPL CALCULATED.3IONS-SCNC: 12 MMOL/L (ref 3–16)
AST SERPL-CCNC: 15 U/L (ref 8–33)
BASOPHILS ABSOLUTE: 0 K/UL (ref 0–0.1)
BASOPHILS RELATIVE PERCENT: 0.5 %
BILIRUB SERPL-MCNC: 0.3 MG/DL (ref 0.3–1.2)
BUN BLDV-MCNC: 8 MG/DL (ref 6–20)
CALCIUM SERPL-MCNC: 9.5 MG/DL (ref 8.5–10.5)
CHLORIDE BLD-SCNC: 100 MMOL/L (ref 98–107)
CHOLESTEROL, TOTAL: 184 MG/DL (ref 0–200)
CO2: 31 MMOL/L (ref 20–30)
CREAT SERPL-MCNC: 0.6 MG/DL (ref 0.4–1.2)
EOSINOPHILS ABSOLUTE: 0 K/UL (ref 0–0.4)
EOSINOPHILS RELATIVE PERCENT: 0.6 %
FOLATE: 10.09 NG/ML
GFR AFRICAN AMERICAN: >59
GFR NON-AFRICAN AMERICAN: >60
GLOBULIN: 2.7 G/DL
GLUCOSE BLD-MCNC: 86 MG/DL (ref 74–106)
HBA1C MFR BLD: 4.8 %
HCT VFR BLD CALC: 42.4 % (ref 37–47)
HDLC SERPL-MCNC: 61 MG/DL (ref 40–60)
HEMOGLOBIN: 13.3 G/DL (ref 11.5–16.5)
IMMATURE GRANULOCYTES #: 0 K/UL
IMMATURE GRANULOCYTES %: 0.2 % (ref 0–5)
LDL CHOLESTEROL CALCULATED: 99 MG/DL
LYMPHOCYTES ABSOLUTE: 2.1 K/UL (ref 1.5–4)
LYMPHOCYTES RELATIVE PERCENT: 31.2 %
MCH RBC QN AUTO: 28.1 PG (ref 27–32)
MCHC RBC AUTO-ENTMCNC: 31.4 G/DL (ref 31–35)
MCV RBC AUTO: 89.6 FL (ref 80–100)
MONOCYTES ABSOLUTE: 0.4 K/UL (ref 0.2–0.8)
MONOCYTES RELATIVE PERCENT: 6 %
NEUTROPHILS ABSOLUTE: 4.1 K/UL (ref 2–7.5)
NEUTROPHILS RELATIVE PERCENT: 61.5 %
PDW BLD-RTO: 13.7 % (ref 11–16)
PLATELET # BLD: 238 K/UL (ref 150–400)
PMV BLD AUTO: 11.5 FL (ref 6–10)
POTASSIUM SERPL-SCNC: 3.8 MMOL/L (ref 3.4–5.1)
RBC # BLD: 4.73 M/UL (ref 3.8–5.8)
SODIUM BLD-SCNC: 143 MMOL/L (ref 136–145)
TOTAL PROTEIN: 7 G/DL (ref 6.4–8.3)
TRIGL SERPL-MCNC: 121 MG/DL (ref 0–249)
TSH SERPL DL<=0.05 MIU/L-ACNC: 0.76 UIU/ML (ref 0.35–5.5)
VITAMIN B-12: 523 PG/ML (ref 211–911)
VLDLC SERPL CALC-MCNC: 24 MG/DL
WBC # BLD: 6.7 K/UL (ref 4–11)

## 2020-01-24 PROCEDURE — 82746 ASSAY OF FOLIC ACID SERUM: CPT

## 2020-01-24 PROCEDURE — 36415 COLL VENOUS BLD VENIPUNCTURE: CPT

## 2020-01-24 PROCEDURE — 84443 ASSAY THYROID STIM HORMONE: CPT

## 2020-01-24 PROCEDURE — 85025 COMPLETE CBC W/AUTO DIFF WBC: CPT

## 2020-01-24 PROCEDURE — 80053 COMPREHEN METABOLIC PANEL: CPT

## 2020-01-24 PROCEDURE — 83036 HEMOGLOBIN GLYCOSYLATED A1C: CPT

## 2020-01-24 PROCEDURE — 82607 VITAMIN B-12: CPT

## 2020-01-24 PROCEDURE — 80061 LIPID PANEL: CPT

## 2020-02-11 NOTE — PROGRESS NOTES
Patient: Jennifer Crabtree    YOB: 1935    Date: 02/13/2020    Primary Care Provider: Izzy Bañuelos APRN    Chief Complaint   Patient presents with   • Difficulty Swallowing   • Abdominal Pain       SUBJECTIVE:    History of present illness:  I saw the patient in the office  today as a consultation for evaluation and treatment of reflux. Patient also states difficulty swallowing and nausea.  Patient denies rectal bleeding, pain mainly in the epigastric region and associated with swallowing solid food.  Food hangs up in her chest for going down.  Patient edentulous last EGD over 8 years ago.    The following portions of the patient's history were reviewed and updated as appropriate: allergies, current medications, past family history, past medical history, past social history, past surgical history and problem list.    Review of Systems   Constitutional: Negative for activity change, appetite change, chills, fever and unexpected weight change.   HENT: Positive for trouble swallowing. Negative for congestion, hearing loss and voice change.    Eyes: Negative for visual disturbance.   Respiratory: Negative for apnea, cough, choking, chest tightness, shortness of breath and wheezing.    Cardiovascular: Negative for chest pain, palpitations and leg swelling.   Gastrointestinal: Positive for abdominal pain and nausea. Negative for abdominal distention, anal bleeding, blood in stool, constipation, diarrhea, rectal pain and vomiting.   Endocrine: Negative for cold intolerance and heat intolerance.   Genitourinary: Negative for difficulty urinating, dysuria, flank pain and frequency.   Musculoskeletal: Negative for back pain, gait problem and myalgias.   Skin: Negative for color change, rash and wound.   Neurological: Negative for dizziness, seizures, syncope, facial asymmetry, speech difficulty, weakness, light-headedness, numbness and headaches.   Hematological: Negative for adenopathy. Does not bruise/bleed  easily.   Psychiatric/Behavioral: Negative for agitation, behavioral problems and confusion. The patient is not nervous/anxious.        History:  Past Medical History:   Diagnosis Date   • Asthma    • Cancer (CMS/HCC)     SKIN CANCER ON LEFT TEMPLE   • COPD (chronic obstructive pulmonary disease) (CMS/HCC)    • Hypertension    • Myocardial infarction (CMS/HCC)    • Stroke (CMS/HCC)        Past Surgical History:   Procedure Laterality Date   • ANKLE OPEN REDUCTION INTERNAL FIXATION     • COLON SURGERY     • EXCISION LESION WITH SPLIT THICKNESS SKIN GRAFT Left 4/28/2017    Procedure: EXCISION LESION WITH SPLIT THICKNESS SKIN GRAFT LEFT TEMPLE;  Surgeon: Javier Aguayo MD;  Location: [facility];  Service:    • KNEE ARTHROSCOPY     • LEG SURGERY         Family History   Problem Relation Age of Onset   • Cancer Brother         prostate       Social History     Tobacco Use   • Smoking status: Former Smoker     Packs/day: 2.00     Years: 50.00     Pack years: 100.00   • Smokeless tobacco: Never Used   • Tobacco comment: QUIT 2 YEARS AGO   Substance Use Topics   • Alcohol use: No     Comment: HX OF ALCOHOL ABUSE   • Drug use: No       Allergies:  Allergies   Allergen Reactions   • Iodine Swelling      REDNESS  AND HAD ITCHING        Medications:    Current Outpatient Medications:   •  diazePAM (VALIUM) 2 MG tablet, Take 2 mg by mouth., Disp: , Rfl:   •  ferrous sulfate 324 (65 Fe) MG tablet delayed-release EC tablet, Take 324 mg by mouth., Disp: , Rfl:   •  folic acid (FOLVITE) 1 MG tablet, Take 1 mg by mouth., Disp: , Rfl:   •  pantoprazole (PROTONIX) 40 MG EC tablet, Take 40 mg by mouth., Disp: , Rfl:   •  BREO ELLIPTA 200-25 MCG/INH aerosol powder , , Disp: , Rfl: 5  •  clopidogrel (PLAVIX) 75 MG tablet, , Disp: , Rfl: 5  •  gabapentin (NEURONTIN) 800 MG tablet, , Disp: , Rfl: 2  •  HYDROcodone-acetaminophen (NORCO)  MG per tablet, Every 6 (Six) Hours., Disp: , Rfl:   •  ipratropium-albuterol (DUONEB) 0.5-2.5 mg/mL  "nebulizer, Every 6 (Six) Hours., Disp: , Rfl:   •  lisinopril (PRINIVIL,ZESTRIL) 5 MG tablet, Take 5 mg by mouth., Disp: , Rfl:   •  metoprolol tartrate (LOPRESSOR) 25 MG tablet, Take 25 mg by mouth., Disp: , Rfl:   •  O2 (OXYGEN), Inhale 2 L into the lungs nightly., Disp: , Rfl:   •  PROAIR  (90 BASE) MCG/ACT inhaler, , Disp: , Rfl: 5  •  simvastatin (ZOCOR) 20 MG tablet, Take 1 tablet by mouth nightly, Disp: , Rfl:     OBJECTIVE:    Vital Signs:   Vitals:    02/13/20 0903   BP: 137/92   Pulse: 80   Temp: 97.8 °F (36.6 °C)   SpO2: 98%   Weight: 45.4 kg (100 lb)   Height: 165.1 cm (65\")       Physical Exam:   General Appearance:    Alert, cooperative, in no acute distress   Head:    Normocephalic, without obvious abnormality, atraumatic   Eyes:            Lids and lashes normal, conjunctivae and sclerae normal, no   icterus, no pallor, corneas clear, PERRLA   Ears:    Ears appear intact with no abnormalities noted   Throat:   No oral lesions, no thrush, oral mucosa moist   Neck:   No adenopathy, supple, trachea midline, no thyromegaly, no   carotid bruit, no JVD   Lungs:     Clear to auscultation,respirations regular, even and                  unlabored    Heart:    Regular rhythm and normal rate, normal S1 and S2, no            murmur, no gallop, no rub, no click   Chest Wall:    No abnormalities observed   Abdomen:     Normal bowel sounds, no masses, no organomegaly, soft        non-tender, non-distended, no guarding, there is evidence of epigastric  tenderness   Extremities:   Moves all extremities well, no edema, no cyanosis, no             redness   Pulses:   Pulses palpable and equal bilaterally   Skin:   No bleeding, bruising or rash   Lymph nodes:   No palpable adenopathy   Neurologic:   Cranial nerves 2 - 12 grossly intact, sensation intact     Results Review:   I reviewed the patient's new clinical results.    Review of Systems was reviewed and confirmed as accurate as documented by the " MA.    ASSESSMENT/PLAN:    1. Gastroesophageal reflux disease with esophagitis    2. Oral phase dysphagia    3. Epigastric pain        I did have a detailed and extensive discussion with the patient in the office and they understand that they need to undergo upper endoscopy. Full risks and benefits of operative versus nonoperative intervention were discussed with the patient and these include bleeding and esophageal injury. The patient understands, agrees, and wishes to proceed with the surgical treatment plan as mentioned above. The patient had no questions for me at the end of the discussion.  Patient placed on a soft diet.  Continue PPI medication.     I discussed the patients findings and my recommendations with patient.     Electronically signed by Javier Aguayo MD  02/13/20 10:28 AM

## 2020-02-13 ENCOUNTER — OFFICE VISIT (OUTPATIENT)
Dept: SURGERY | Facility: CLINIC | Age: 85
End: 2020-02-13

## 2020-02-13 VITALS
HEART RATE: 80 BPM | SYSTOLIC BLOOD PRESSURE: 137 MMHG | TEMPERATURE: 97.8 F | BODY MASS INDEX: 16.66 KG/M2 | OXYGEN SATURATION: 98 % | DIASTOLIC BLOOD PRESSURE: 92 MMHG | HEIGHT: 65 IN | WEIGHT: 100 LBS

## 2020-02-13 DIAGNOSIS — R10.13 EPIGASTRIC PAIN: ICD-10-CM

## 2020-02-13 DIAGNOSIS — R10.11 RIGHT UPPER QUADRANT ABDOMINAL PAIN: Primary | ICD-10-CM

## 2020-02-13 DIAGNOSIS — K21.00 GASTROESOPHAGEAL REFLUX DISEASE WITH ESOPHAGITIS: Primary | ICD-10-CM

## 2020-02-13 DIAGNOSIS — R13.11 ORAL PHASE DYSPHAGIA: ICD-10-CM

## 2020-02-13 PROCEDURE — 99214 OFFICE O/P EST MOD 30 MIN: CPT | Performed by: SURGERY

## 2020-02-13 RX ORDER — PANTOPRAZOLE SODIUM 40 MG/1
40 TABLET, DELAYED RELEASE ORAL
COMMUNITY
Start: 2015-01-21

## 2020-02-13 RX ORDER — LISINOPRIL 5 MG/1
2.5 TABLET ORAL DAILY
COMMUNITY

## 2020-02-13 RX ORDER — FERROUS SULFATE TAB EC 324 MG (65 MG FE EQUIVALENT) 324 (65 FE) MG
324 TABLET DELAYED RESPONSE ORAL
COMMUNITY
Start: 2019-02-05

## 2020-02-13 RX ORDER — DIAZEPAM 2 MG/1
2 TABLET ORAL
COMMUNITY
Start: 2016-01-28 | End: 2021-04-30 | Stop reason: SDUPTHER

## 2020-02-13 RX ORDER — FOLIC ACID 1 MG/1
1 TABLET ORAL
COMMUNITY
Start: 2019-02-06

## 2020-02-18 ENCOUNTER — HOSPITAL ENCOUNTER (OUTPATIENT)
Dept: ULTRASOUND IMAGING | Facility: HOSPITAL | Age: 85
Discharge: HOME | End: 2020-02-18
Payer: MEDICARE

## 2020-02-18 PROCEDURE — 76705 ECHO EXAM OF ABDOMEN: CPT

## 2020-02-20 ENCOUNTER — ANESTHESIA EVENT (OUTPATIENT)
Dept: ENDOSCOPY | Facility: HOSPITAL | Age: 85
End: 2020-02-20
Payer: MEDICARE

## 2020-02-20 ENCOUNTER — OUTSIDE FACILITY SERVICE (OUTPATIENT)
Dept: SURGERY | Facility: CLINIC | Age: 85
End: 2020-02-20

## 2020-02-20 ENCOUNTER — ANESTHESIA (OUTPATIENT)
Dept: ENDOSCOPY | Facility: HOSPITAL | Age: 85
End: 2020-02-20
Payer: MEDICARE

## 2020-02-20 ENCOUNTER — HOSPITAL ENCOUNTER (OUTPATIENT)
Facility: HOSPITAL | Age: 85
Setting detail: OUTPATIENT SURGERY
Discharge: HOME | End: 2020-02-20
Attending: SURGERY | Admitting: SURGERY
Payer: MEDICARE

## 2020-02-20 VITALS
SYSTOLIC BLOOD PRESSURE: 147 MMHG | BODY MASS INDEX: 17.33 KG/M2 | DIASTOLIC BLOOD PRESSURE: 78 MMHG | TEMPERATURE: 97.8 F | HEIGHT: 65 IN | HEART RATE: 66 BPM | OXYGEN SATURATION: 97 % | RESPIRATION RATE: 20 BRPM | WEIGHT: 104 LBS

## 2020-02-20 VITALS
RESPIRATION RATE: 29 BRPM | OXYGEN SATURATION: 98 % | DIASTOLIC BLOOD PRESSURE: 84 MMHG | SYSTOLIC BLOOD PRESSURE: 162 MMHG

## 2020-02-20 PROCEDURE — 7100000010 HC PHASE II RECOVERY - FIRST 15 MIN: Performed by: SURGERY

## 2020-02-20 PROCEDURE — 2500000003 HC RX 250 WO HCPCS: Performed by: NURSE ANESTHETIST, CERTIFIED REGISTERED

## 2020-02-20 PROCEDURE — 7100000011 HC PHASE II RECOVERY - ADDTL 15 MIN: Performed by: SURGERY

## 2020-02-20 PROCEDURE — 2709999900 HC NON-CHARGEABLE SUPPLY: Performed by: SURGERY

## 2020-02-20 PROCEDURE — 3609012400 HC EGD TRANSORAL BIOPSY SINGLE/MULTIPLE: Performed by: SURGERY

## 2020-02-20 PROCEDURE — 43239 EGD BIOPSY SINGLE/MULTIPLE: CPT | Performed by: SURGERY

## 2020-02-20 PROCEDURE — 2580000003 HC RX 258: Performed by: SURGERY

## 2020-02-20 PROCEDURE — 88305 TISSUE EXAM BY PATHOLOGIST: CPT

## 2020-02-20 PROCEDURE — 6360000002 HC RX W HCPCS: Performed by: NURSE ANESTHETIST, CERTIFIED REGISTERED

## 2020-02-20 PROCEDURE — 3700000000 HC ANESTHESIA ATTENDED CARE: Performed by: SURGERY

## 2020-02-20 PROCEDURE — 3700000001 HC ADD 15 MINUTES (ANESTHESIA): Performed by: SURGERY

## 2020-02-20 RX ORDER — PROPOFOL 10 MG/ML
INJECTION, EMULSION INTRAVENOUS PRN
Status: DISCONTINUED | OUTPATIENT
Start: 2020-02-20 | End: 2020-02-20 | Stop reason: SDUPTHER

## 2020-02-20 RX ORDER — SODIUM CHLORIDE, SODIUM LACTATE, POTASSIUM CHLORIDE, CALCIUM CHLORIDE 600; 310; 30; 20 MG/100ML; MG/100ML; MG/100ML; MG/100ML
INJECTION, SOLUTION INTRAVENOUS CONTINUOUS
Status: DISCONTINUED | OUTPATIENT
Start: 2020-02-20 | End: 2020-02-20 | Stop reason: HOSPADM

## 2020-02-20 RX ADMIN — PROPOFOL 30 MG: 10 INJECTION, EMULSION INTRAVENOUS at 10:40

## 2020-02-20 RX ADMIN — LIDOCAINE HYDROCHLORIDE 20 MG: 10 INJECTION, SOLUTION INFILTRATION; PERINEURAL at 10:36

## 2020-02-20 RX ADMIN — PROPOFOL 40 MG: 10 INJECTION, EMULSION INTRAVENOUS at 10:36

## 2020-02-20 RX ADMIN — SODIUM CHLORIDE, POTASSIUM CHLORIDE, SODIUM LACTATE AND CALCIUM CHLORIDE: 600; 310; 30; 20 INJECTION, SOLUTION INTRAVENOUS at 10:14

## 2020-02-20 ASSESSMENT — COPD QUESTIONNAIRES: CAT_SEVERITY: MILD

## 2020-02-20 NOTE — PROGRESS NOTES
Pt arrives ambulatory. No complaints noted. Verifies she is here for EGD with Dr. Lindsey Carranza. No questions noted. Consent on chart. States daughter will drive her home post-procedure. Pt has COPD. Notes that she gets SOA upon exertion. Wears O2 at 2L/nc when needed. Denies chest pain or SOA.  +BS. States that she is here for abdominal pain and nausea after eating. Side rails up x 2.

## 2020-02-20 NOTE — ANESTHESIA POSTPROCEDURE EVALUATION
Department of Anesthesiology  Postprocedure Note    Patient: Savage Samaniego  MRN: [id number]  YOB: 1935  Date of evaluation: 2/20/2020  Time:  11:00 AM     Procedure Summary     Date:  02/20/20 Room / Location:  [facility] 01 / [address]    Anesthesia Start:  9040 Anesthesia Stop:  21     Procedure:  EGD BIOPSY (N/A ) Diagnosis:       (K21.0)      (R13.11)      (R10.13)    Surgeon:  Maria Teresa Ayoub MD Responsible Provider:  TOMER Livingston CRNA    Anesthesia Type:  MAC ASA Status:  3          Anesthesia Type: MAC    Jacqueline Phase I: Jacqueline Score: 10    Jacqueline Phase II:      Last vitals: Reviewed and per EMR flowsheets.        Anesthesia Post Evaluation    Patient location during evaluation: bedside  Patient participation: complete - patient participated  Level of consciousness: awake and alert  Airway patency: patent  Nausea & Vomiting: no nausea and no vomiting  Complications: no  Cardiovascular status: blood pressure returned to baseline  Respiratory status: acceptable  Hydration status: stable

## 2020-02-20 NOTE — OP NOTE
PATIENT:    Hilda Scanlon    DATE OF SURGERY:  02/20/20    PHYSICIAN:    Cortez Castellanos MD, FACS    REFERRING PHYSICIAN:  TOMER Cheatham CNP    YOB: 1935    PREOPERATIVE DIAGNOSIS: Weight loss, nausea vomiting    POSTOPERATIVE DIAGNOSIS: Pyloric stenosis, esophagitis      Estimated blood loss: None    PROCEDURE:  Esophagogastroduodenoscopy with biopsy. HISTORY:  The patient was sent to me as a consultation via TOMER Cheatham CNP for evaluation and treatment of the above-mentioned symptomatology. The patient is here now today for elective upper endoscopy with biopsy. I did meet with the patient preoperatively who understands the full risks and benefits of the above-mentioned procedure. We will proceed with this today on an elective basis. ANESTHESIA:  The patient was monitored both preoperatively and postoperatively in the normal fashion from a cardiovascular standpoint. Oxygen was delivered at 2 liters per nasal cannula, and oxygen saturations were monitored during the procedure. OPERATIVE PROCEDURE:  The patient was taken to the endoscopy unit and placed in the supine position and given anesthesia as mentioned above. The patient was then placed in the left lateral decubitus position and the scope was introduced into the patients mouth and into the esophagus. The findings were as follows:  1. Esophagus - the esophagus was entered without difficulty. Good visualization was obtained from the oropharyngeal region down to the gastroesophageal junction. There was no evidence of esophageal masses, stricture, or extrinsic compression. Biopsy x2 obtained    2. Stomach - the stomach was entered without difficulty. There was excellent visualization obtained of all mucosal surfaces. A retroflexed view was obtained and this showed no evidence of acute abnormalities. There was no evidence of intragastric masses, neoplasms, or extrinsic compression.       3. Duodenum - the

## 2020-02-20 NOTE — ANESTHESIA PRE PROCEDURE
Department of Anesthesiology  Preprocedure Note       Name:  Tomas Strong   Age:  82 y.o.  :  1935                                          MRN:  [id number]         Date:  2020      Surgeon: Keyla Almanza):  Leta Monk MD    Procedure: EGD BIOPSY (N/A )    Medications prior to admission:   Prior to Admission medications    Medication Sig Start Date End Date Taking? Authorizing Provider   HYDROcodone-acetaminophen (NORCO) 7.5-325 MG per tablet Take 1 tablet by mouth 2 times daily as needed for Pain. Shanna Morrow Historical Provider, MD   Fluticasone Furoate-Vilanterol (BREO ELLIPTA) 200-25 MCG/INH AEPB Inhale 1 puff into the lungs daily    Historical Provider, MD   metoprolol tartrate (LOPRESSOR) 25 MG tablet Take 25 mg by mouth daily    Historical Provider, MD   lisinopril (PRINIVIL;ZESTRIL) 5 MG tablet Take 5 mg by mouth daily    Historical Provider, MD   guaiFENesin (MUCINEX) 600 MG extended release tablet Take 1 tablet by mouth 2 times daily 19   TOMER Longo   ferrous sulfate 324 (65 Fe) MG EC tablet Take 1 tablet by mouth 2 times daily (with meals) 19   TOMER Longo   folic acid (FOLVITE) 1 MG tablet Take 1 tablet by mouth daily 19   TOMER Longo   ipratropium-albuterol (DUONEB) 0.5-2.5 (3) MG/3ML SOLN nebulizer solution Inhale 3 mLs into the lungs every 6 hours as needed for Shortness of Breath 19   TOMER Longo   predniSONE (DELTASONE) 10 MG tablet Take 3 tablets PO daily x 3 days, 2 tablets PO daily x 3 days, 1 tablet PO daily x 3 days, then stop 19   TOMER Longo   diazepam (VALIUM) 2 MG tablet Take 1 tablet by mouth every 12 hours as needed for Anxiety  Patient taking differently: Take 5 mg by mouth daily as needed for Anxiety.  . 16   Vivian Wray MD   simvastatin (ZOCOR) 20 MG tablet Take 1 tablet by mouth nightly 4/23/15   Coit Shoemaker, APRN   vitamin D (ERGOCALCIFEROL) 86980 UNITS capsule Take 1 capsule by mouth once a week 5/1/15   TOMER Adams   pantoprazole (PROTONIX) 40 MG tablet Take 1 tablet by mouth daily. 1/21/15   Sebastien Li MD   OXYGEN Inhale 2 L into the lungs nightly. Historical Provider, MD   gabapentin (NEURONTIN) 800 MG tablet Take 1 tablet by mouth 3 times daily. 7/7/14   Marleen Torre MD   clopidogrel (PLAVIX) 75 MG tablet Take 1 tablet by mouth daily. 6/23/14   Marleen Torre MD   albuterol (PROVENTIL HFA;VENTOLIN HFA) 108 (90 BASE) MCG/ACT inhaler Inhale 2 puffs into the lungs every 6 hours as needed for Wheezing. Historical Provider, MD       Current medications:    Current Facility-Administered Medications   Medication Dose Route Frequency Provider Last Rate Last Dose    lactated ringers infusion   Intravenous Continuous Deepthi Hassan MD 80 mL/hr at 02/20/20 1014         Allergies: Allergies   Allergen Reactions    Iodine Swelling       Problem List:    Patient Active Problem List   Diagnosis Code    Coronary artery disease involving native heart without angina pectoris I25.10    CVD (cerebrovascular disease) I67.9    Chronic obstructive pulmonary disease (HCC) J44.9    Abdominal aortic aneurysm (AAA) without rupture ([facility] [address] I71.4    Thoracic aortic aneurysm ([facility]) I71.2    Hyperlipidemia E78.5    Tobacco abuse Z72.0    Anxiety F41.9    Changes in vision H53.9    Hypokalemia E87.6    Essential hypertension I10    Vitamin D deficiency E55.9    Anemia D64.9    Facial skin lesion L98.9    Declining functional status R53.81    Closed fracture of left femur with routine healing S72. 80XD    Skin cancer of face C44.300    Tibia/fibula fracture S82.209A, S82.409A    Acute exacerbation of chronic obstructive pulmonary disease (COPD) ([facility]) J44.1    Malnutrition ([facility]) E46    Acute cystitis N30.00    Generalized weakness R53.1    Altered mental status R41.82       Past Medical History:        Diagnosis Date    Abdominal aortic aneurysm (HCC)     Anxiety    

## 2020-02-20 NOTE — PROGRESS NOTES
Pt tolerating PO well without N/V. IV DC'ed with tip intact and pressure applied. No complications at site. DC instructions with follow up given without questions. Condition stable. Belongings with pt. Pt left endoscopy with dgt. Pt ambulatory. DC instructions in hand.

## 2020-02-27 NOTE — H&P
 predniSONE (DELTASONE) 10 MG tablet Take 3 tablets PO daily x 3 days, 2 tablets PO daily x 3 days, 1 tablet PO daily x 3 days, then stop 18 tablet 0    diazepam (VALIUM) 2 MG tablet Take 1 tablet by mouth every 12 hours as needed for Anxiety (Patient taking differently: Take 5 mg by mouth daily as needed for Anxiety. Carlos Haddad ) 30 tablet 0    simvastatin (ZOCOR) 20 MG tablet Take 1 tablet by mouth nightly 30 tablet 0    vitamin D (ERGOCALCIFEROL) 13824 UNITS capsule Take 1 capsule by mouth once a week 8 capsule 0    pantoprazole (PROTONIX) 40 MG tablet Take 1 tablet by mouth daily. 30 tablet 3    OXYGEN Inhale 2 L into the lungs nightly.  gabapentin (NEURONTIN) 800 MG tablet Take 1 tablet by mouth 3 times daily. 90 tablet 5    clopidogrel (PLAVIX) 75 MG tablet Take 1 tablet by mouth daily. 30 tablet 5    albuterol (PROVENTIL HFA;VENTOLIN HFA) 108 (90 BASE) MCG/ACT inhaler Inhale 2 puffs into the lungs every 6 hours as needed for Wheezing.           Electronically signed by Arline Hodge MD on 2/27/2020 at 11:01 AM

## 2020-03-11 NOTE — PROGRESS NOTES
Patient: Jennifer Crabtree    YOB: 1935    Date: 03/12/2020    Primary Care Provider: Izzy Bañuelos APRN    Reason for Consultation: Follow-up EGD    Chief Complaint   Patient presents with   • Follow-up       History of present illness:  I saw the patient in the office today as a followup from their recent EGD with biopsy, the pathology report did show oxyntic mucosa with mild foveolar hyperplasia.  Patient continues to complain of dysphagia,lack of appetite and nausea.  Patient does not take daily antacids.  Has reflux symptoms every other day.    The following portions of the patient's history were reviewed and updated as appropriate: allergies, current medications, past family history, past medical history, past social history, past surgical history and problem list.    Review of Systems   Constitutional: Positive for appetite change. Negative for chills, fever and unexpected weight change.   HENT: Positive for trouble swallowing. Negative for hearing loss and voice change.    Eyes: Negative for visual disturbance.   Respiratory: Negative for apnea, cough, chest tightness, shortness of breath and wheezing.    Cardiovascular: Negative for chest pain, palpitations and leg swelling.   Gastrointestinal: Positive for nausea. Negative for abdominal distention, abdominal pain, anal bleeding, blood in stool, constipation, diarrhea, rectal pain and vomiting.   Endocrine: Negative for cold intolerance and heat intolerance.   Genitourinary: Negative for difficulty urinating, dysuria and flank pain.   Musculoskeletal: Negative for back pain and gait problem.   Skin: Negative for color change, rash and wound.   Neurological: Negative for dizziness, syncope, speech difficulty, weakness, light-headedness, numbness and headaches.   Hematological: Negative for adenopathy. Does not bruise/bleed easily.   Psychiatric/Behavioral: Negative for confusion. The patient is not nervous/anxious.        Vital Signs:   Vitals:  "   03/12/20 1012   BP: 140/90   Pulse: 84   Temp: 97.9 °F (36.6 °C)   SpO2: 97%   Weight: 45.5 kg (100 lb 3.2 oz)   Height: 165.1 cm (65\")       Allergies:  Allergies   Allergen Reactions   • Iodine Swelling      REDNESS  AND HAD ITCHING        Medications:    Current Outpatient Medications:   •  BREO ELLIPTA 200-25 MCG/INH aerosol powder , , Disp: , Rfl: 5  •  clopidogrel (PLAVIX) 75 MG tablet, , Disp: , Rfl: 5  •  diazePAM (VALIUM) 2 MG tablet, Take 2 mg by mouth., Disp: , Rfl:   •  ferrous sulfate 324 (65 Fe) MG tablet delayed-release EC tablet, Take 324 mg by mouth., Disp: , Rfl:   •  folic acid (FOLVITE) 1 MG tablet, Take 1 mg by mouth., Disp: , Rfl:   •  gabapentin (NEURONTIN) 800 MG tablet, , Disp: , Rfl: 2  •  HYDROcodone-acetaminophen (NORCO)  MG per tablet, Every 6 (Six) Hours., Disp: , Rfl:   •  ipratropium-albuterol (DUONEB) 0.5-2.5 mg/mL nebulizer, Every 6 (Six) Hours., Disp: , Rfl:   •  lisinopril (PRINIVIL,ZESTRIL) 5 MG tablet, Take 5 mg by mouth., Disp: , Rfl:   •  metoprolol tartrate (LOPRESSOR) 25 MG tablet, Take 25 mg by mouth., Disp: , Rfl:   •  O2 (OXYGEN), Inhale 2 L into the lungs nightly., Disp: , Rfl:   •  pantoprazole (PROTONIX) 40 MG EC tablet, Take 40 mg by mouth., Disp: , Rfl:   •  PROAIR  (90 BASE) MCG/ACT inhaler, , Disp: , Rfl: 5  •  simvastatin (ZOCOR) 20 MG tablet, Take 1 tablet by mouth nightly, Disp: , Rfl:     Physical Exam:   General Appearance:    Alert, cooperative, in no acute distress   Abdomen:     no masses, no organomegaly, soft with epigastric discomfort and guarding.  No abdominal wall hernias.   Chest:      Clear toausculation            Cor:  Regular rate and rhythm      Results Review:   I reviewed the patient's new clinical results.    Review of Systems was reviewed and confirmed as accurate as documented by the MA.    Assessment / Plan:    1. Gastroesophageal reflux disease with esophagitis    2. Epigastric pain        I did discuss the situation with " the patient today in the office and they have done well from their recent EGD with biopsy. I have told the patient she will start on Prilosec daily.  Avoid caffeine alcohol and nicotine.  Follow-up as needed..    Electronically signed by Javier Aguayo MD  03/12/20      Portions of this note have been scribed for Javier Aguayo MD by Ekta Reeves. 3/12/2020  10:27

## 2020-03-12 ENCOUNTER — OFFICE VISIT (OUTPATIENT)
Dept: SURGERY | Facility: CLINIC | Age: 85
End: 2020-03-12

## 2020-03-12 VITALS
TEMPERATURE: 97.9 F | OXYGEN SATURATION: 97 % | WEIGHT: 100.2 LBS | HEART RATE: 84 BPM | BODY MASS INDEX: 16.69 KG/M2 | DIASTOLIC BLOOD PRESSURE: 90 MMHG | SYSTOLIC BLOOD PRESSURE: 140 MMHG | HEIGHT: 65 IN

## 2020-03-12 DIAGNOSIS — R10.13 EPIGASTRIC PAIN: ICD-10-CM

## 2020-03-12 DIAGNOSIS — K21.00 GASTROESOPHAGEAL REFLUX DISEASE WITH ESOPHAGITIS: Primary | ICD-10-CM

## 2020-03-12 PROCEDURE — 99212 OFFICE O/P EST SF 10 MIN: CPT | Performed by: SURGERY

## 2020-03-12 RX ORDER — OMEPRAZOLE 40 MG/1
40 CAPSULE, DELAYED RELEASE ORAL DAILY
Qty: 30 CAPSULE | Refills: 11 | Status: SHIPPED | OUTPATIENT
Start: 2020-03-12 | End: 2020-04-02

## 2020-04-02 RX ORDER — OMEPRAZOLE 40 MG/1
40 CAPSULE, DELAYED RELEASE ORAL DAILY
Qty: 90 CAPSULE | Refills: 3 | Status: SHIPPED | OUTPATIENT
Start: 2020-04-02 | End: 2021-04-02

## 2020-08-26 ENCOUNTER — HOSPITAL ENCOUNTER (OUTPATIENT)
Dept: CT IMAGING | Facility: HOSPITAL | Age: 85
Discharge: HOME | End: 2020-08-26
Payer: MEDICARE

## 2020-08-26 PROCEDURE — 74176 CT ABD & PELVIS W/O CONTRAST: CPT

## 2020-08-26 PROCEDURE — 71250 CT THORAX DX C-: CPT

## 2020-09-21 ENCOUNTER — HOSPITAL ENCOUNTER (EMERGENCY)
Facility: HOSPITAL | Age: 85
Discharge: HOME | End: 2020-09-21
Attending: FAMILY MEDICINE
Payer: MEDICARE

## 2020-09-21 VITALS
HEART RATE: 70 BPM | DIASTOLIC BLOOD PRESSURE: 81 MMHG | SYSTOLIC BLOOD PRESSURE: 137 MMHG | TEMPERATURE: 98.2 F | RESPIRATION RATE: 16 BRPM | OXYGEN SATURATION: 100 %

## 2020-09-21 LAB
ANION GAP SERPL CALCULATED.3IONS-SCNC: -1 MMOL/L (ref 3–16)
BACTERIA: ABNORMAL /HPF
BASOPHILS ABSOLUTE: 0 K/UL (ref 0–0.1)
BASOPHILS RELATIVE PERCENT: 0.5 %
BILIRUBIN URINE: NEGATIVE
BLOOD, URINE: ABNORMAL
BUN BLDV-MCNC: 8 MG/DL (ref 6–20)
CALCIUM SERPL-MCNC: 9.3 MG/DL (ref 8.5–10.5)
CHLORIDE BLD-SCNC: 94 MMOL/L (ref 98–107)
CLARITY: ABNORMAL
CO2: 46 MMOL/L (ref 20–30)
COLOR: YELLOW
CREAT SERPL-MCNC: <0.5 MG/DL (ref 0.4–1.2)
EOSINOPHILS ABSOLUTE: 0.1 K/UL (ref 0–0.4)
EOSINOPHILS RELATIVE PERCENT: 1.7 %
EPITHELIAL CELLS, UA: ABNORMAL /HPF (ref 0–5)
GFR AFRICAN AMERICAN: >59
GFR NON-AFRICAN AMERICAN: >60
GLUCOSE BLD-MCNC: 107 MG/DL (ref 74–106)
GLUCOSE URINE: NEGATIVE MG/DL
HCT VFR BLD CALC: 40.6 % (ref 37–47)
HEMOGLOBIN: 11.9 G/DL (ref 11.5–16.5)
IMMATURE GRANULOCYTES #: 0 K/UL
IMMATURE GRANULOCYTES %: 0.2 % (ref 0–5)
KETONES, URINE: NEGATIVE MG/DL
LEUKOCYTE ESTERASE, URINE: ABNORMAL
LYMPHOCYTES ABSOLUTE: 1.1 K/UL (ref 1.5–4)
LYMPHOCYTES RELATIVE PERCENT: 19.1 %
MCH RBC QN AUTO: 29 PG (ref 27–32)
MCHC RBC AUTO-ENTMCNC: 29.3 G/DL (ref 31–35)
MCV RBC AUTO: 98.8 FL (ref 80–100)
MICROSCOPIC EXAMINATION: YES
MONOCYTES ABSOLUTE: 0.4 K/UL (ref 0.2–0.8)
MONOCYTES RELATIVE PERCENT: 7.1 %
NEUTROPHILS ABSOLUTE: 4.1 K/UL (ref 2–7.5)
NEUTROPHILS RELATIVE PERCENT: 71.4 %
NITRITE, URINE: POSITIVE
PDW BLD-RTO: 12.6 % (ref 11–16)
PH UA: 6 (ref 5–8)
PLATELET # BLD: 187 K/UL (ref 150–400)
PMV BLD AUTO: 10.6 FL (ref 6–10)
POTASSIUM SERPL-SCNC: 4.4 MMOL/L (ref 3.4–5.1)
PROTEIN UA: NEGATIVE MG/DL
RBC # BLD: 4.11 M/UL (ref 3.8–5.8)
RBC UA: ABNORMAL /HPF (ref 0–4)
SODIUM BLD-SCNC: 139 MMOL/L (ref 136–145)
SPECIFIC GRAVITY UA: 1.02 (ref 1–1.03)
URINE REFLEX TO CULTURE: YES
URINE TYPE: ABNORMAL
UROBILINOGEN, URINE: 0.2 E.U./DL
WBC # BLD: 5.8 K/UL (ref 4–11)
WBC UA: ABNORMAL /HPF (ref 0–5)

## 2020-09-21 PROCEDURE — 87186 SC STD MICRODIL/AGAR DIL: CPT

## 2020-09-21 PROCEDURE — 6360000002 HC RX W HCPCS: Performed by: FAMILY MEDICINE

## 2020-09-21 PROCEDURE — 81001 URINALYSIS AUTO W/SCOPE: CPT

## 2020-09-21 PROCEDURE — 96361 HYDRATE IV INFUSION ADD-ON: CPT

## 2020-09-21 PROCEDURE — 87086 URINE CULTURE/COLONY COUNT: CPT

## 2020-09-21 PROCEDURE — 80048 BASIC METABOLIC PNL TOTAL CA: CPT

## 2020-09-21 PROCEDURE — 85025 COMPLETE CBC W/AUTO DIFF WBC: CPT

## 2020-09-21 PROCEDURE — 2580000003 HC RX 258: Performed by: FAMILY MEDICINE

## 2020-09-21 PROCEDURE — 99282 EMERGENCY DEPT VISIT SF MDM: CPT

## 2020-09-21 PROCEDURE — 96365 THER/PROPH/DIAG IV INF INIT: CPT

## 2020-09-21 PROCEDURE — 87077 CULTURE AEROBIC IDENTIFY: CPT

## 2020-09-21 PROCEDURE — 99281 EMR DPT VST MAYX REQ PHY/QHP: CPT

## 2020-09-21 PROCEDURE — 36415 COLL VENOUS BLD VENIPUNCTURE: CPT

## 2020-09-21 RX ORDER — DOXYCYCLINE HYCLATE 100 MG
100 TABLET ORAL 2 TIMES DAILY
Qty: 14 TABLET | Refills: 0 | Status: SHIPPED | OUTPATIENT
Start: 2020-09-21 | End: 2020-09-28

## 2020-09-21 RX ORDER — SODIUM CHLORIDE 9 MG/ML
1000 INJECTION, SOLUTION INTRAVENOUS CONTINUOUS
Status: DISCONTINUED | OUTPATIENT
Start: 2020-09-21 | End: 2020-09-21 | Stop reason: HOSPADM

## 2020-09-21 RX ORDER — FLUCONAZOLE 100 MG/1
100 TABLET ORAL DAILY
Qty: 5 TABLET | Refills: 0 | Status: SHIPPED | OUTPATIENT
Start: 2020-09-21 | End: 2020-09-26

## 2020-09-21 RX ADMIN — CEFTRIAXONE 1 G: 1 INJECTION, POWDER, FOR SOLUTION INTRAMUSCULAR; INTRAVENOUS at 18:15

## 2020-09-21 RX ADMIN — SODIUM CHLORIDE 1000 ML: 9 INJECTION, SOLUTION INTRAVENOUS at 18:15

## 2020-09-21 ASSESSMENT — ENCOUNTER SYMPTOMS
SHORTNESS OF BREATH: 0
COUGH: 0
ABDOMINAL PAIN: 0
VOMITING: 0
DIARRHEA: 0
NAUSEA: 0

## 2020-09-21 NOTE — ED NOTES
Pt/family given d/c orders verbally and written. Pt/Family with no questions or concerns r/t d/c. Pt to POV per w/c. No acute distress noted on d/c.      Mary Alice Wilson RN  09/21/20 1341

## 2020-09-21 NOTE — ED NOTES
Patient with altered mental status x 2 days, patient hasn't been eating well over 2 weeks, patient has only had 1/2 biscuit and gravy and grilled cheese in about 3 days. Patient is drinking ok except today.      Zoe Agarwal RN  09/21/20 [address] Momo Langston RN  09/21/20 9615

## 2020-09-21 NOTE — ED NOTES
Called patients son at this time for a list of patients medications at this time.      Raghu Rockwell RN  09/21/20 9779

## 2020-09-21 NOTE — ED PROVIDER NOTES
[address] [place] [address] Court  eMERGENCY dEPARTMENT eNCOUnter      Pt Name: Diane Bernstein  MRN: [id number]  [place] 1935  Date of evaluation: 9/21/2020  Provider: Felisha Katz MD    [address] [place] [address]       Chief Complaint   Patient presents with    Altered Mental Status    Emesis         HISTORY OF PRESENT ILLNESS   (Location/Symptom, Timing/Onset, Context/Setting, Quality, Duration, Modifying Factors, Severity)  Note limiting factors. Diane Bernstein is a 82 y.o. female who presents to the emergency department with a 3-day history of \"talking out of her head\". Daughter states that the confusion became worse today. No fever, vomiting or diarrhea. No cough. Nursing Notes were reviewed. REVIEW OF SYSTEMS    (2-9 systems for level 4, 10 or more forlevel 5)     Review of Systems   Constitutional: Positive for appetite change. Negative for fever. Respiratory: Negative for cough and shortness of breath. Cardiovascular: Negative for chest pain. Gastrointestinal: Negative for abdominal pain, diarrhea, nausea and vomiting. Neurological: Negative for facial asymmetry and speech difficulty. Psychiatric/Behavioral: Positive for confusion. Except as noted above the remainder of the review of systems was reviewed and negative.        PAST MEDICAL HISTORY     Past Medical History:   Diagnosis Date    Abdominal aortic aneurysm (HCC)     Anxiety     Aortic aneurysm, thoracic (HCC)     Ascending    Arthritis     CAD (coronary artery disease)     COPD (chronic obstructive pulmonary disease) (HCC)     CVD (cerebrovascular disease)     Hip fx ([address] 2005    Hip pain     Hyperlipidemia     Lung nodule 08/2020    \"spot on her lung\" unknown if it is cancer    Tobacco abuse     Unspecified cerebral artery occlusion with cerebral infarction          SURGICAL HISTORY       Past Surgical History:   Procedure Laterality Date    COLECTOMY      Benign Tumor    KNEE SURGERY Left 2017    UPPER GASTROINTESTINAL ENDOSCOPY N/A 2/20/2020    EGD BIOPSY performed by Melinda Olvera MD at [address] [place] [address] Drive       Previous Medications    ALBUTEROL (PROVENTIL HFA;VENTOLIN HFA) 108 (90 BASE) MCG/ACT INHALER    Inhale 2 puffs into the lungs every 6 hours as needed for Wheezing. CLOPIDOGREL (PLAVIX) 75 MG TABLET    Take 1 tablet by mouth daily. DIAZEPAM (VALIUM) 2 MG TABLET    Take 1 tablet by mouth every 12 hours as needed for Anxiety    FERROUS SULFATE 324 (65 FE) MG EC TABLET    Take 1 tablet by mouth 2 times daily (with meals)    FLUTICASONE FUROATE-VILANTEROL (BREO ELLIPTA) 200-25 MCG/INH AEPB    Inhale 1 puff into the lungs daily    FOLIC ACID (FOLVITE) 1 MG TABLET    Take 1 tablet by mouth daily    GABAPENTIN (NEURONTIN) 800 MG TABLET    Take 1 tablet by mouth 3 times daily. HYDROCODONE-ACETAMINOPHEN (NORCO) 7.5-325 MG PER TABLET    Take 1 tablet by mouth 2 times daily as needed for Pain. .    IPRATROPIUM-ALBUTEROL (DUONEB) 0.5-2.5 (3) MG/3ML SOLN NEBULIZER SOLUTION    Inhale 3 mLs into the lungs every 6 hours as needed for Shortness of Breath    LISINOPRIL (PRINIVIL;ZESTRIL) 5 MG TABLET    Take 5 mg by mouth daily    METOPROLOL TARTRATE (LOPRESSOR) 25 MG TABLET    Take 25 mg by mouth daily    OXYGEN    Inhale 2 L into the lungs daily     PANTOPRAZOLE (PROTONIX) 40 MG TABLET    Take 1 tablet by mouth daily. SIMVASTATIN (ZOCOR) 20 MG TABLET    Take 1 tablet by mouth nightly    VITAMIN D (ERGOCALCIFEROL) 16224 UNITS CAPSULE    Take 1 capsule by mouth once a week       ALLERGIES     Iodine    FAMILY HISTORY     History reviewed. No pertinent family history. SOCIAL HISTORY       Social History     Socioeconomic History    Marital status:       Spouse name: None    Number of children: None    Years of education: None    Highest education level: None   Occupational History    None   Social Needs    Financial resource strain: None    Food insecurity     Worry: None cardiologist.        RADIOLOGY:   Non-plain film images such as CT, Ultrasound and MRI are read by the radiologist. Patrick Rodrigo images are visualized and preliminarily interpreted by the emergency physician with the below findings:        Interpretation per the Radiologist below, if available at the time of this note:    No orders to display         ED BEDSIDE ULTRASOUND:   Performed by ED Physician - none    LABS:  Labs Reviewed   CBC WITH AUTO DIFFERENTIAL - Abnormal; Notable for the following components:       Result Value    MCHC 29.3 (*)     MPV 10.6 (*)     Lymphocytes Absolute 1.1 (*)     All other components within normal limits    Narrative:     Performed at:  [address] [place] [address] Laboratory  [address] [place] [address],  [place], [address]   Phone [phone] - Abnormal; Notable for the following components:    Chloride 94 (*)     CO2 46 (*)     Anion Gap -1 (*)     Glucose 107 (*)     All other components within normal limits    Narrative:     Kortney Melendez tel. [phone],  Chemistry results called to and read back by Lidia Segundo RN, 09/21/2020  18:14, by [facility]  Performed at:  [address] [place] [address] Laboratory  [address] [place] [address],  [place], [address]   Phone [phone]   URINE RT REFLEX TO CULTURE - Abnormal; Notable for the following components:    Clarity, UA CLOUDY (*)     Blood, Urine MODERATE (*)     Nitrite, Urine POSITIVE (*)     Leukocyte Esterase, Urine SMALL (*)     All other components within normal limits    Narrative:     Performed at:  [address] [place] [address] Laboratory  [address] [place] [address],  [place], [address]   Phone [phone]   MICROSCOPIC URINALYSIS - Abnormal; Notable for the following components:    WBC, UA 21-50 (*)     Bacteria, UA 3+ (*)     All other components within normal limits    Narrative:     Performed at:  [address] [place] [address] Laboratory  [address] [facility] [address]   Phone [phone]   CULTURE, URINE       All other labs were within normal range or not returned as of this dictation. EMERGENCY DEPARTMENT COURSE and DIFFERENTIALDIAGNOSIS/MDM:   Vitals:    Vitals:    09/21/20 1707   BP: (!) 140/67   Pulse: 62   Resp: 20   Temp: 98.2 °F (36.8 °C)   TempSrc: Oral   SpO2: 99%           CRITICALCARE TIME   Total Critical Care time was 0 minutes, excludingseparately reportable procedures. There was a high probabilityof clinically significant/life threatening deterioration in the patient's condition which required my urgent intervention. CONSULTS:  None    PROCEDURES:  None    FINAL IMPRESSION      1. Altered mental status, unspecified altered mental status type    2.  Urinary tract infection with hematuria, site unspecified        DISPOSITION/PLAN   DISPOSITION Decision To Discharge 09/21/2020 06:21:21 PM      PATIENT REFERRED TO:  TOMER Middleton CNP  [address]  [phone]    In 2 days  If symptoms worsen      DISCHARGE MEDICATIONS:  New Prescriptions    DOXYCYCLINE HYCLATE (VIBRA-TABS) 100 MG TABLET    Take 1 tablet by mouth 2 times daily for 7 days    FLUCONAZOLE (DIFLUCAN) 100 MG TABLET    Take 1 tablet by mouth daily for 5 days       (Please note that portions ofthis note were completed with a voice recognition program.  Efforts were made to edit the dictations but occasionally words are mis-transcribed.)    Damion Ritter MD(electronically signed)  Attending Emergency Physician          Damion Ritter MD  09/21/20 2055

## 2020-09-21 NOTE — ED NOTES
Critical CO2 reported to Dr. Jean-Pierre Cifuentes at this time.       Regis Obregon RN  09/21/20 9904

## 2020-09-25 LAB
ORGANISM: ABNORMAL
URINE CULTURE, ROUTINE: ABNORMAL

## 2020-11-06 ENCOUNTER — OFFICE VISIT (OUTPATIENT)
Dept: PULMONOLOGY | Facility: CLINIC | Age: 85
End: 2020-11-06

## 2020-11-06 VITALS
RESPIRATION RATE: 20 BRPM | HEART RATE: 77 BPM | SYSTOLIC BLOOD PRESSURE: 108 MMHG | WEIGHT: 98 LBS | BODY MASS INDEX: 16.33 KG/M2 | OXYGEN SATURATION: 99 % | DIASTOLIC BLOOD PRESSURE: 62 MMHG | HEIGHT: 65 IN | TEMPERATURE: 97.6 F

## 2020-11-06 DIAGNOSIS — R91.1 LUNG NODULE SEEN ON IMAGING STUDY: ICD-10-CM

## 2020-11-06 DIAGNOSIS — J44.9 COPD, SEVERE (HCC): ICD-10-CM

## 2020-11-06 DIAGNOSIS — J43.2 CENTRILOBULAR EMPHYSEMA (HCC): Primary | ICD-10-CM

## 2020-11-06 DIAGNOSIS — R63.4 WEIGHT LOSS: ICD-10-CM

## 2020-11-06 DIAGNOSIS — R06.09 DYSPNEA ON EXERTION: ICD-10-CM

## 2020-11-06 PROCEDURE — 99204 OFFICE O/P NEW MOD 45 MIN: CPT | Performed by: INTERNAL MEDICINE

## 2020-11-06 RX ORDER — ALBUTEROL SULFATE 90 UG/1
2 AEROSOL, METERED RESPIRATORY (INHALATION) EVERY 4 HOURS PRN
Qty: 6 G | Refills: 5 | Status: SHIPPED | OUTPATIENT
Start: 2020-11-06 | End: 2020-12-06

## 2020-11-06 NOTE — PROGRESS NOTES
New Pulmonary Patient Office Visit      Patient Name: Jennifer Crabtree    Referring Physician: Izzy Bañuelos APRN    Chief Complaint:    Chief Complaint   Patient presents with   • Consult   • Lung Nodule       History of Present Illness: Jennifer Crabtree is a 85 y.o. female who is here today to establish care with Pulmonary for abnormal CT scan of the chest.    Patient is accompanied by her daughter today who contributes significantly to the history.    Patient had CT chest abdomen pelvis done in August of this year secondary to progressive weight loss over the last year.  They are unsure how much weight she has lost because they have not weighed her, but have noted her clothes have significantly loosened.  It showed an area on her right lung of scarring versus nodule.    She has known COPD and was diagnosed more than 10 years ago.  She requires 4 L nasal cannula at baseline.  She denies frequent exacerbations, but does have to get antibiotics every once in a while for urinary tract infections.  She has severe dyspnea with dressing and showering.  She admits to chronic cough that produces white mucus, but has never had hemoptysis.  She will have intermittent wheezing.  Her dyspnea is worsened with minimal exertion, but does improve with rest and her inhalers.      Subjective      Review of Systems:   Review of Systems   Constitutional: Positive for chills, fatigue and unexpected weight loss. Negative for appetite change and fever.   HENT: Negative for nosebleeds, sore throat and voice change.    Eyes: Negative for discharge and visual disturbance.   Respiratory: Positive for cough, shortness of breath and wheezing.    Cardiovascular: Negative for chest pain, palpitations and leg swelling.   Gastrointestinal: Positive for GERD. Negative for abdominal pain, blood in stool, constipation and diarrhea.   Endocrine: Negative for cold intolerance and heat intolerance.   Genitourinary: Negative for difficulty urinating and  hematuria.   Musculoskeletal: Positive for arthralgias. Negative for joint swelling and myalgias.   Skin: Negative for rash and bruise.   Allergic/Immunologic: Negative for immunocompromised state.   Neurological: Negative for dizziness and weakness.   Hematological: Negative for adenopathy. Does not bruise/bleed easily.   Psychiatric/Behavioral: Negative for suicidal ideas and depressed mood. The patient is nervous/anxious.    All other systems reviewed and are negative.      Past Medical History:   Past Medical History:   Diagnosis Date   • Asthma    • Cancer (CMS/HCC)     SKIN CANCER ON LEFT TEMPLE   • COPD (chronic obstructive pulmonary disease) (CMS/HCC)    • Hypertension    • Myocardial infarction (CMS/HCC)    • Stroke (CMS/HCC)        Past Surgical History:   Past Surgical History:   Procedure Laterality Date   • ANKLE OPEN REDUCTION INTERNAL FIXATION     • COLON SURGERY     • EXCISION LESION WITH SPLIT THICKNESS SKIN GRAFT Left 4/28/2017    Procedure: EXCISION LESION WITH SPLIT THICKNESS SKIN GRAFT LEFT TEMPLE;  Surgeon: Javier Aguayo MD;  [facility];  Service:    • KNEE ARTHROSCOPY     • LEG SURGERY         Family History:   Family History   Problem Relation Age of Onset   • Cancer Brother         prostate   • Cancer Mother    • No Known Problems Father        Social History:   Social History     Socioeconomic History   • Marital status: Single     Spouse name: Not on file   • Number of children: Not on file   • Years of education: Not on file   • Highest education level: Not on file   Tobacco Use   • Smoking status: Current Some Day Smoker     Years: 50.00   • Smokeless tobacco: Never Used   • Tobacco comment: QUIT 2 YEARS AGO- States she smokes 1-2 cigarettes a day   Substance and Sexual Activity   • Alcohol use: No     Comment: HX OF ALCOHOL ABUSE   • Drug use: No   • Sexual activity: Defer       Medications:     Current Outpatient Medications:   •  clopidogrel (PLAVIX) 75 MG tablet, , Disp: ,  "Rfl: 5  •  diazePAM (VALIUM) 2 MG tablet, Take 2 mg by mouth., Disp: , Rfl:   •  gabapentin (NEURONTIN) 800 MG tablet, , Disp: , Rfl: 2  •  HYDROcodone-acetaminophen (NORCO)  MG per tablet, Every 6 (Six) Hours., Disp: , Rfl:   •  lisinopril (PRINIVIL,ZESTRIL) 5 MG tablet, Take 5 mg by mouth., Disp: , Rfl:   •  metoprolol tartrate (LOPRESSOR) 25 MG tablet, Take 25 mg by mouth., Disp: , Rfl:   •  O2 (OXYGEN), Inhale 2 L into the lungs nightly., Disp: , Rfl:   •  simvastatin (ZOCOR) 20 MG tablet, Take 1 tablet by mouth nightly, Disp: , Rfl:   •  albuterol sulfate  (90 Base) MCG/ACT inhaler, Inhale 2 puffs Every 4 (Four) Hours As Needed for Wheezing for up to 30 days., Disp: 6 g, Rfl: 5  •  ferrous sulfate 324 (65 Fe) MG tablet delayed-release EC tablet, Take 324 mg by mouth., Disp: , Rfl:   •  Fluticasone-Umeclidin-Vilant (Trelegy Ellipta) 100-62.5-25 MCG/INH aerosol powder , Inhale 1 puff Daily for 90 days., Disp: 3 each, Rfl: 3  •  folic acid (FOLVITE) 1 MG tablet, Take 1 mg by mouth., Disp: , Rfl:   •  ipratropium-albuterol (DUONEB) 0.5-2.5 mg/mL nebulizer, Every 6 (Six) Hours., Disp: , Rfl:   •  O2 (OXYGEN), Inhale 2 L., Disp: , Rfl:   •  omeprazole (PrilOSEC) 40 MG capsule, Take 1 capsule by mouth Daily., Disp: 90 capsule, Rfl: 3  •  pantoprazole (PROTONIX) 40 MG EC tablet, Take 40 mg by mouth., Disp: , Rfl:     Allergies:   Allergies   Allergen Reactions   • Iodine Swelling      REDNESS  AND HAD ITCHING        Objective     Physical Exam:  Vital Signs:   Vitals:    11/06/20 1034   BP: 108/62   Pulse: 77   Resp: 20   Temp: 97.6 °F (36.4 °C)   SpO2: 99%   Weight: 44.5 kg (98 lb)   Height: 165.1 cm (65\")       Physical Exam  Vitals signs and nursing note reviewed.   Constitutional:       Appearance: She is well-developed. She is not toxic-appearing.      Comments: Thin,  female who appears chronically ill   HENT:      Head: Normocephalic and atraumatic.      Right Ear: External ear normal.      " Nose: Nose normal. No rhinorrhea.      Mouth/Throat:      Mouth: Mucous membranes are moist.      Pharynx: Oropharynx is clear. No oropharyngeal exudate.   Eyes:      General: No scleral icterus.        Right eye: No discharge.         Left eye: No discharge.      Extraocular Movements: Extraocular movements intact.      Conjunctiva/sclera: Conjunctivae normal.   Neck:      Musculoskeletal: Normal range of motion and neck supple.      Trachea: No tracheal deviation.   Cardiovascular:      Rate and Rhythm: Normal rate and regular rhythm.      Heart sounds: No murmur.   Pulmonary:      Comments: Decreased breath sounds throughout, but no wheezing or rhonchi  Abdominal:      General: There is no distension.      Palpations: Abdomen is soft.   Musculoskeletal:         General: No tenderness.      Right lower leg: No edema.      Left lower leg: No edema.      Comments: Decreased muscle mass throughout   Skin:     General: Skin is warm and dry.      Findings: No rash.   Neurological:      Mental Status: She is alert and oriented to person, place, and time.      Motor: Weakness present.   Psychiatric:         Mood and Affect: Mood normal.         Behavior: Behavior normal.         Thought Content: Thought content normal.         Judgment: Judgment normal.       Results Review:   Labs: Reviewed.  Lab Results   Component Value Date    WBC 5.8 09/21/2020    HGB 11.9 09/21/2020    HCT 40.6 09/21/2020    MCV 98.8 09/21/2020     09/21/2020       Lab Results   Component Value Date    CALCIUM 9.0 04/25/2015     (L) 04/25/2015    K 3.4 (L) 04/25/2015    CO2 32 (H) 04/25/2015    CL 92 (L) 04/25/2015    BUN 8 04/25/2015    CREATININE 0.6 04/25/2015    ANIONGAP 13 04/25/2015         Micro: As of November 6, 2020   No results found for: RESPCX  No results found for: BLOODCX  Lab Results   Component Value Date    URINECX >100,000 CFU/mL Providencia stuartii (A) 05/18/2017     No results found for: MRSACX  No results found  for: MRSAPCR  Lab Results   Component Value Date    URCX Yes 09/21/2020    URCX Yes 01/31/2019          Radiology Scans:   Last CT scan was reviewed in great detail with the patient. Images reviewed personally.   August 2020 CT scan of the chest showed patchy tree-in-bud opacities in right upper lobe.  Nodular opacity which is questionable for scarring versus neoplasm in the right lung apex which is 1 cm in diameter.  No gross consolidation appreciated.  No lymphadenopathy.  Advanced COPD/centrilobular emphysema changes noted.    PFT IMPRESSION:    None available for review    Assessment / Plan      Assessment/Plan:    1. Centrilobular emphysema (CMS/HCC)  Continue on triple therapy with Trelegy.  Continues as needed medications of DuoNeb's or as needed albuterol.  Check PFTs to evaluate severity of COPD prior to next visit    - Pulmonary Function Test; Future    2. COPD, severe (CMS/HCC)  See above.  Advised to continue supplemental oxygen    3. Lung nodule seen on imaging study  1 cm nodule noted on imaging studies and it is unclear how much of this is related to scarring versus possible underlying malignancy especially given her weight loss.  Will further evaluate with PET scan.  If patient's nodule is hypermetabolic, we would have to discuss whether or not she would tolerate biopsy.  They just want an answer at this point about what is causing her weight loss.   - NM Pet Skull Base To Mid Thigh; Future    4. Dyspnea on exertion  Most likely related to her COPD which appears to be rather severe and rather advanced.  Encouraged her to maintain her activity level    5. Weight loss  Unclear if it is related to inadequately treated COPD as she was only recently started on Trelegy versus malignancy.  Work-up as above.       Follow Up:   Return in about 3 months (around 2/6/2021).    Leigh Holland MD  Pulmonary/Critical Care Physician   [place]      Please note that portions of this note may have been completed with  a voice recognition program. Efforts were made to edit the dictations, but occasionally words are mistranscribed.

## 2020-11-14 ENCOUNTER — HOSPITAL ENCOUNTER (EMERGENCY)
Facility: HOSPITAL | Age: 85
Discharge: HOME | End: 2020-11-14
Attending: HOSPITALIST
Payer: MEDICARE

## 2020-11-14 VITALS
SYSTOLIC BLOOD PRESSURE: 110 MMHG | WEIGHT: 105 LBS | TEMPERATURE: 97.4 F | DIASTOLIC BLOOD PRESSURE: 66 MMHG | RESPIRATION RATE: 20 BRPM | HEIGHT: 64 IN | OXYGEN SATURATION: 95 % | HEART RATE: 77 BPM | BODY MASS INDEX: 17.93 KG/M2

## 2020-11-14 LAB
BILIRUBIN URINE: NEGATIVE
BLOOD, URINE: ABNORMAL
CLARITY: CLEAR
COLOR: YELLOW
EPITHELIAL CELLS, UA: ABNORMAL /HPF (ref 0–5)
GLUCOSE URINE: NEGATIVE MG/DL
HYALINE CASTS: ABNORMAL /LPF (ref 0–2)
KETONES, URINE: NEGATIVE MG/DL
LEUKOCYTE ESTERASE, URINE: NEGATIVE
MICROSCOPIC EXAMINATION: YES
MUCUS: ABNORMAL /LPF
NITRITE, URINE: NEGATIVE
PH UA: 6 (ref 5–8)
PROTEIN UA: ABNORMAL MG/DL
RBC UA: ABNORMAL /HPF (ref 0–4)
SPECIFIC GRAVITY UA: 1.02 (ref 1–1.03)
URINE REFLEX TO CULTURE: ABNORMAL
URINE TYPE: ABNORMAL
UROBILINOGEN, URINE: 1 E.U./DL
WBC UA: ABNORMAL /HPF (ref 0–5)

## 2020-11-14 PROCEDURE — 99283 EMERGENCY DEPT VISIT LOW MDM: CPT

## 2020-11-14 PROCEDURE — 81001 URINALYSIS AUTO W/SCOPE: CPT

## 2020-11-14 PROCEDURE — 51702 INSERT TEMP BLADDER CATH: CPT

## 2020-11-14 NOTE — ED PROVIDER NOTES
[facility] ENCOUNTER      Pt Name: Tawnya Hicks  MRN: [id number]  YOB: 1935  Date of evaluation: 11/14/2020  Provider: Bebe Roche [address] [place] [address]       Chief Complaint   Patient presents with    Dysuria         HISTORY OF PRESENT ILLNESS  (Location/Symptom, Timing/Onset, Context/Setting, Quality, Duration, Modifying Factors, Severity.)   Tawnya Hicks is a 82 y.o. female who presents to the emergency department for dysuria. Patient states that she started have some burning with urination about 2 days ago. Patient advises that she has not noticed any blood in her urine. She has had a recent urinary tract infection about 2 months ago. She denies any fevers or chills. Denies any abdominal pain. Denies any nausea vomiting or diarrhea. Denies any sensation of urgency. Denies any sensation of increased frequency. The only complaint the patient has is some mild burning with urination. Denies any other complaints at this time. Patient does use 2.5 L nasal cannula at home O2 continuously. Denies any chest pain or shortness of breath. Denies any sick contacts that she is aware of. Nursing notes were reviewed. REVIEW OFSYSTEMS    (2-9 systems for level 4, 10 or more for level 5)   ROS:  General:  No fevers, no chills, no weakness  Cardiovascular:  No chest pain, no palpitations  Respiratory:  No shortness of breath, no cough, no wheezing  Gastrointestinal:  No pain, no nausea, no vomiting, no diarrhea  Musculoskeletal:  No muscle pain, no joint pain  Skin:  No rash, no easy bruising  Neurologic:  No speech problems, no headache, no extremity weakness  Psychiatric:  No anxiety  Genitourinary:  + dysuria, no hematuria    Except as noted above the remainder of the review of systems was reviewed and negative.        PAST MEDICAL HISTORY     Past Medical History:   Diagnosis Date    Abdominal aortic aneurysm ([address]     Anxiety     Aortic aneurysm, thoracic (HCC)     Ascending    Arthritis     CAD (coronary artery disease)     COPD (chronic obstructive pulmonary disease) (HCC)     CVD (cerebrovascular disease)     Hip fx ([address] 2005    Hip pain     Hyperlipidemia     Lung nodule 08/2020    \"spot on her lung\" unknown if it is cancer    Tobacco abuse     Unspecified cerebral artery occlusion with cerebral infarction          SURGICAL HISTORY       Past Surgical History:   Procedure Laterality Date    COLECTOMY      Benign Tumor    KNEE SURGERY Left 2017    UPPER GASTROINTESTINAL ENDOSCOPY N/A 2/20/2020    EGD BIOPSY performed by Oleg Soler MD at [address] [place] [address] Drive       Previous Medications    ALBUTEROL (PROVENTIL HFA;VENTOLIN HFA) 108 (90 BASE) MCG/ACT INHALER    Inhale 2 puffs into the lungs every 6 hours as needed for Wheezing. CLOPIDOGREL (PLAVIX) 75 MG TABLET    Take 1 tablet by mouth daily. DIAZEPAM (VALIUM) 2 MG TABLET    Take 1 tablet by mouth every 12 hours as needed for Anxiety    FERROUS SULFATE 324 (65 FE) MG EC TABLET    Take 1 tablet by mouth 2 times daily (with meals)    FLUTICASONE FUROATE-VILANTEROL (BREO ELLIPTA) 200-25 MCG/INH AEPB    Inhale 1 puff into the lungs daily    FOLIC ACID (FOLVITE) 1 MG TABLET    Take 1 tablet by mouth daily    GABAPENTIN (NEURONTIN) 800 MG TABLET    Take 1 tablet by mouth 3 times daily. HYDROCODONE-ACETAMINOPHEN (NORCO) 7.5-325 MG PER TABLET    Take 1 tablet by mouth 2 times daily as needed for Pain. .    IPRATROPIUM-ALBUTEROL (DUONEB) 0.5-2.5 (3) MG/3ML SOLN NEBULIZER SOLUTION    Inhale 3 mLs into the lungs every 6 hours as needed for Shortness of Breath    LISINOPRIL (PRINIVIL;ZESTRIL) 5 MG TABLET    Take 5 mg by mouth daily    METOPROLOL TARTRATE (LOPRESSOR) 25 MG TABLET    Take 25 mg by mouth daily    OXYGEN    Inhale 2 L into the lungs daily     PANTOPRAZOLE (PROTONIX) 40 MG TABLET    Take 1 tablet by mouth daily.     SIMVASTATIN (ZOCOR) 20 MG TABLET exudate. Uvula is midline  Cardiac: Heart regular rate, rhythm, no murmurs, rubs, or gallops  Lungs: Lungs are clear to auscultation, there is no wheezing, rhonchi, or rales. There is no use of accessory muscles. Abdomen: Abdomen is soft, nontender, nondistended. There is no firm or pulsatile masses, no rebound rigidity or guarding. Musculoskeletal: 5 out of 5 strength in all 4 extremities. No focal muscle deficits are appreciated  Neuro: Motor intact, sensory intact, level of consciousness is normal  Dermatology: Skin is warm and dry  Psych: Mentation is grossly normal, cognition is grossly normal. Affect is appropriate.       DIAGNOSTIC RESULTS     EKG: All EKG's are interpreted by the Emergency Department Physician who either signs or Co-signs this chart in the [address] a cardiologist.        RADIOLOGY:   Non-plain film images such as CT, Ultrasound and MRI are read by the radiologist. [address] Lua radiographic images are visualized and preliminarily interpreted by the emergency physician with the below findings:      ? Radiologist's Report Reviewed:  No orders to display         ED BEDSIDE ULTRASOUND:   Performed by ED Physician - none    LABS:    I have reviewed and interpreted all of the currently available lab results from this visit (ifapplicable):  Results for orders placed or performed during the hospital encounter of 11/14/20   Urinalysis Reflex to Culture    Specimen: Urine, clean catch   Result Value Ref Range    Color, UA Yellow Straw/Yellow    Clarity, UA Clear Clear    Glucose, Ur Negative Negative mg/dL    Bilirubin Urine Negative Negative    Ketones, Urine Negative Negative mg/dL    Specific Gravity, UA 1.020 1.005 - 1.030    Blood, Urine SMALL (A) Negative    pH, UA 6.0 5.0 - 8.0    Protein, UA TRACE (A) Negative mg/dL    Urobilinogen, Urine 1.0 <2.0 E.U./dL    Nitrite, Urine Negative Negative    Leukocyte Esterase, Urine Negative Negative    Microscopic Examination YES     Urine Type Catheter Urine Reflex to Culture Not Indicated    Microscopic Urinalysis   Result Value Ref Range    Hyaline Casts, UA 3-5 (A) 0 - 2 /LPF    Mucus, UA 1+ (A) None Seen /LPF    WBC, UA 3-5 0 - 5 /HPF    RBC, UA 11-20 (A) 0 - 4 /HPF    Epithelial Cells, UA 2-5 0 - 5 /HPF        All other labs were within normal range or not returned as of this dictation. EMERGENCY DEPARTMENT COURSE and DIFFERENTIAL DIAGNOSIS/MDM:   Vitals:    Vitals:    11/14/20 1137   BP: 109/71   Pulse: 101   Resp: 18   Temp: 97.2 °F (36.2 °C)   TempSrc: Oral   SpO2: 99%   Weight: 105 lb (47.6 kg)   Height: 5' 4\" (1.626 m)       MEDICATIONS ADMINISTERED IN ED:  Medications - No data to display    After initial evaluation and examination I did have a conversation with the patient about the upcoming plan, treatment possible disposition which they were agreeable to the times dictation. Patient vies we perform a UA. She attempted to give us a urine sample but was unable to secondary to stating she had just used the bathroom prior to arrival here. Patient was agreeable to have in and out cath performed for sterile sample. Patient's final disposition be determined once her diagnostic studies been performed reviewed. Patient's resting come from the stretcher no acute distress. Nontoxic-appearing. Reviewed the patient's records show that she did have a urinary tract infection back on 9/25/2020 culture and sensitivities showed growth of Providencia stuartii which was sensitive to cefepime, Rocephin, ertapenem, meropenem, Zosyn and Bactrim. UA showed small amount of blood and trace protein. Microscopy showed 3-5 hyaline casts, +1 mucus, 3-5 white cells, 11-20 red cells and 2-5 epithelial cells. Does not meet criteria for culture. Patient's diagnostic studies were discussed with her she does state her understanding.   Advised that not sure what is causing her intermittent episodes of burning with urination but she does not have acute urinary tract infection based on her a culture today. Patient be discharged home in stable condition. No antibiotic is warranted at this time. The patient advised that she does need to follow-up with her regular family physician within the next 1 to 2 days reevaluation. Patient was also given instructions that if her symptoms worsens or new symptoms arise she should return back to emergency department for further evaluation work-up. CONSULTS:  None    PROCEDURES:  Procedures    CRITICAL CARE TIME    Total Critical Care time was 0 minutes, excluding separately reportable procedures. There was a high probability of clinically significant/life threatening deterioration in the patient's condition which required my urgent intervention. FINAL IMPRESSION      1. Dysuria          DISPOSITION/PLAN   DISPOSITION        PATIENT REFERRED TO:  TOMER Garcia - CNP  [address]  [phone]    In 2 days      [facility]  [facility] [address] [facility]  [phone]    As needed, If symptoms worsen      DISCHARGE MEDICATIONS:  New Prescriptions    No medications on file       Comment: Please note this report has been produced using speech recognition software and may contain errorsrelated to that system including errors in grammar, punctuation, and spelling, as well as words and phrases that may be inappropriate. If there are any questions or concerns please feel free to contact the dictating providerfor clarification.     Ramo Ackerman DO  Attending Emergency Physician              Ramo Ackerman DO  11/14/20 1234

## 2020-11-14 NOTE — ED TRIAGE NOTES
Patient states she has another \"kidney infection\", has been having some burning when she pees, ongoing for a couple days. Denies abd / flank pain. No N/V/D.

## 2020-11-14 NOTE — ED NOTES
I/O cath, #14F placed, urine collected and sent to lab. Patient tolerated well.      Jackson Rick RN  11/14/20 0283

## 2020-11-14 NOTE — ED NOTES
Patient to bathroom with assist to collect urine for ua. Patient states she is unable to pee at this time because she just went to the bathroom before she got here.      Mariusz Dorman RN  11/14/20 [id number]

## 2020-11-14 NOTE — ED NOTES
AVS reviewed with patient, understanding verbalized. Patient with no further needs or concerns voiced. Patient discharged at this time and follow up with pcp recommended.      Ivette Rod RN  11/14/20 6626

## 2020-11-27 ENCOUNTER — APPOINTMENT (OUTPATIENT)
Dept: CT IMAGING | Facility: HOSPITAL | Age: 85
End: 2020-11-27
Payer: MEDICARE

## 2020-11-27 ENCOUNTER — APPOINTMENT (OUTPATIENT)
Dept: GENERAL RADIOLOGY | Facility: HOSPITAL | Age: 85
End: 2020-11-27
Payer: MEDICARE

## 2020-11-27 ENCOUNTER — HOSPITAL ENCOUNTER (EMERGENCY)
Facility: HOSPITAL | Age: 85
Discharge: ANOTHER HOSPITAL | End: 2020-11-27
Attending: HOSPITALIST
Payer: MEDICARE

## 2020-11-27 VITALS
DIASTOLIC BLOOD PRESSURE: 44 MMHG | TEMPERATURE: 97.1 F | OXYGEN SATURATION: 98 % | SYSTOLIC BLOOD PRESSURE: 94 MMHG | BODY MASS INDEX: 17.07 KG/M2 | HEART RATE: 77 BPM | RESPIRATION RATE: 20 BRPM | WEIGHT: 100 LBS | HEIGHT: 64 IN

## 2020-11-27 LAB
A/G RATIO: 0.7 (ref 0.8–2)
ALBUMIN SERPL-MCNC: 2.5 G/DL (ref 3.4–4.8)
ALP BLD-CCNC: 132 U/L (ref 25–100)
ALT SERPL-CCNC: 12 U/L (ref 4–36)
AMMONIA: 40 MCG/DL (ref 19–87)
ANION GAP SERPL CALCULATED.3IONS-SCNC: 6 MMOL/L (ref 3–16)
AST SERPL-CCNC: 18 U/L (ref 8–33)
BACTERIA: ABNORMAL /HPF
BASOPHILS ABSOLUTE: 0.2 K/UL (ref 0–0.1)
BASOPHILS RELATIVE PERCENT: 0.4 %
BILIRUB SERPL-MCNC: 0.3 MG/DL (ref 0.3–1.2)
BILIRUBIN URINE: NEGATIVE
BLOOD, URINE: NEGATIVE
BUN BLDV-MCNC: 38 MG/DL (ref 6–20)
CALCIUM SERPL-MCNC: 9 MG/DL (ref 8.5–10.5)
CHLORIDE BLD-SCNC: 86 MMOL/L (ref 98–107)
CLARITY: CLEAR
CO2: 39 MMOL/L (ref 20–30)
COLOR: YELLOW
CREAT SERPL-MCNC: 1.4 MG/DL (ref 0.4–1.2)
EOSINOPHILS ABSOLUTE: 0 K/UL (ref 0–0.4)
EOSINOPHILS RELATIVE PERCENT: 0 %
EPITHELIAL CELLS, UA: ABNORMAL /HPF (ref 0–5)
GFR AFRICAN AMERICAN: 43
GFR NON-AFRICAN AMERICAN: 36
GLOBULIN: 3.5 G/DL
GLUCOSE BLD-MCNC: 148 MG/DL (ref 74–106)
GLUCOSE URINE: NEGATIVE MG/DL
HCT VFR BLD CALC: 31.5 % (ref 37–47)
HEMOGLOBIN: 9 G/DL (ref 11.5–16.5)
IMMATURE GRANULOCYTES #: 1.4 K/UL
IMMATURE GRANULOCYTES %: 2.5 % (ref 0–5)
KETONES, URINE: NEGATIVE MG/DL
LACTIC ACID: 1.8 MMOL/L (ref 0.4–2)
LEUKOCYTE ESTERASE, URINE: ABNORMAL
LYMPHOCYTES ABSOLUTE: 1 K/UL (ref 1.5–4)
LYMPHOCYTES RELATIVE PERCENT: 1.8 %
MCH RBC QN AUTO: 29.4 PG (ref 27–32)
MCHC RBC AUTO-ENTMCNC: 28.6 G/DL (ref 31–35)
MCV RBC AUTO: 102.9 FL (ref 80–100)
MICROSCOPIC EXAMINATION: YES
MONOCYTES ABSOLUTE: 2.7 K/UL (ref 0.2–0.8)
MONOCYTES RELATIVE PERCENT: 4.9 %
NEUTROPHILS ABSOLUTE: 49.7 K/UL (ref 2–7.5)
NEUTROPHILS RELATIVE PERCENT: 90.4 %
NITRITE, URINE: POSITIVE
PDW BLD-RTO: 13.3 % (ref 11–16)
PH UA: 5 (ref 5–8)
PLATELET # BLD: 486 K/UL (ref 150–400)
PMV BLD AUTO: 10.8 FL (ref 6–10)
POTASSIUM REFLEX MAGNESIUM: 4.9 MMOL/L (ref 3.4–5.1)
PROTEIN UA: 30 MG/DL
RBC # BLD: 3.06 M/UL (ref 3.8–5.8)
RBC UA: ABNORMAL /HPF (ref 0–4)
REASON FOR REJECTION: NORMAL
REJECTED TEST: NORMAL
SARS-COV-2, NAAT: NOT DETECTED
SARS-COV-2, PCR: NORMAL
SODIUM BLD-SCNC: 131 MMOL/L (ref 136–145)
SPECIFIC GRAVITY UA: 1.02 (ref 1–1.03)
TOTAL PROTEIN: 6 G/DL (ref 6.4–8.3)
TROPONIN: <0.3 NG/ML
URINE REFLEX TO CULTURE: YES
URINE TYPE: ABNORMAL
UROBILINOGEN, URINE: 1 E.U./DL
WBC # BLD: 55 K/UL (ref 4–11)
WBC UA: ABNORMAL /HPF (ref 0–5)

## 2020-11-27 PROCEDURE — 6360000002 HC RX W HCPCS: Performed by: HOSPITALIST

## 2020-11-27 PROCEDURE — 2500000003 HC RX 250 WO HCPCS: Performed by: EMERGENCY MEDICINE

## 2020-11-27 PROCEDURE — 96375 TX/PRO/DX INJ NEW DRUG ADDON: CPT

## 2020-11-27 PROCEDURE — 2500000003 HC RX 250 WO HCPCS

## 2020-11-27 PROCEDURE — 96376 TX/PRO/DX INJ SAME DRUG ADON: CPT

## 2020-11-27 PROCEDURE — 36415 COLL VENOUS BLD VENIPUNCTURE: CPT

## 2020-11-27 PROCEDURE — 87040 BLOOD CULTURE FOR BACTERIA: CPT

## 2020-11-27 PROCEDURE — 74176 CT ABD & PELVIS W/O CONTRAST: CPT

## 2020-11-27 PROCEDURE — 80053 COMPREHEN METABOLIC PANEL: CPT

## 2020-11-27 PROCEDURE — U0003 INFECTIOUS AGENT DETECTION BY NUCLEIC ACID (DNA OR RNA); SEVERE ACUTE RESPIRATORY SYNDROME CORONAVIRUS 2 (SARS-COV-2) (CORONAVIRUS DISEASE [COVID-19]), AMPLIFIED PROBE TECHNIQUE, MAKING USE OF HIGH THROUGHPUT TECHNOLOGIES AS DESCRIBED BY CMS-2020-01-R: HCPCS

## 2020-11-27 PROCEDURE — 96361 HYDRATE IV INFUSION ADD-ON: CPT

## 2020-11-27 PROCEDURE — 84484 ASSAY OF TROPONIN QUANT: CPT

## 2020-11-27 PROCEDURE — 99284 EMERGENCY DEPT VISIT MOD MDM: CPT

## 2020-11-27 PROCEDURE — 93005 ELECTROCARDIOGRAM TRACING: CPT

## 2020-11-27 PROCEDURE — 87086 URINE CULTURE/COLONY COUNT: CPT

## 2020-11-27 PROCEDURE — 71045 X-RAY EXAM CHEST 1 VIEW: CPT

## 2020-11-27 PROCEDURE — 81001 URINALYSIS AUTO W/SCOPE: CPT

## 2020-11-27 PROCEDURE — 2580000003 HC RX 258: Performed by: EMERGENCY MEDICINE

## 2020-11-27 PROCEDURE — 87088 URINE BACTERIA CULTURE: CPT

## 2020-11-27 PROCEDURE — 82140 ASSAY OF AMMONIA: CPT

## 2020-11-27 PROCEDURE — 99291 CRITICAL CARE FIRST HOUR: CPT

## 2020-11-27 PROCEDURE — 96367 TX/PROPH/DG ADDL SEQ IV INF: CPT

## 2020-11-27 PROCEDURE — 96366 THER/PROPH/DIAG IV INF ADDON: CPT

## 2020-11-27 PROCEDURE — 2580000003 HC RX 258: Performed by: HOSPITALIST

## 2020-11-27 PROCEDURE — 70450 CT HEAD/BRAIN W/O DYE: CPT

## 2020-11-27 PROCEDURE — U0002 COVID-19 LAB TEST NON-CDC: HCPCS

## 2020-11-27 PROCEDURE — 83605 ASSAY OF LACTIC ACID: CPT

## 2020-11-27 PROCEDURE — 87186 SC STD MICRODIL/AGAR DIL: CPT

## 2020-11-27 PROCEDURE — 96365 THER/PROPH/DIAG IV INF INIT: CPT

## 2020-11-27 PROCEDURE — 85025 COMPLETE CBC W/AUTO DIFF WBC: CPT

## 2020-11-27 RX ORDER — METOPROLOL TARTRATE 5 MG/5ML
5 INJECTION INTRAVENOUS ONCE
Status: COMPLETED | OUTPATIENT
Start: 2020-11-27 | End: 2020-11-27

## 2020-11-27 RX ORDER — 0.9 % SODIUM CHLORIDE 0.9 %
30 INTRAVENOUS SOLUTION INTRAVENOUS ONCE
Status: COMPLETED | OUTPATIENT
Start: 2020-11-27 | End: 2020-11-27

## 2020-11-27 RX ORDER — 0.9 % SODIUM CHLORIDE 0.9 %
1000 INTRAVENOUS SOLUTION INTRAVENOUS ONCE
Status: COMPLETED | OUTPATIENT
Start: 2020-11-27 | End: 2020-11-27

## 2020-11-27 RX ORDER — DILTIAZEM HYDROCHLORIDE 5 MG/ML
INJECTION INTRAVENOUS
Status: DISCONTINUED
Start: 2020-11-27 | End: 2020-11-27

## 2020-11-27 RX ORDER — LEVOFLOXACIN 5 MG/ML
500 INJECTION, SOLUTION INTRAVENOUS ONCE
Status: COMPLETED | OUTPATIENT
Start: 2020-11-27 | End: 2020-11-27

## 2020-11-27 RX ORDER — METOPROLOL TARTRATE 5 MG/5ML
INJECTION INTRAVENOUS
Status: COMPLETED
Start: 2020-11-27 | End: 2020-11-27

## 2020-11-27 RX ORDER — LORATADINE 10 MG/1
10 TABLET ORAL DAILY
COMMUNITY
End: 2021-04-29

## 2020-11-27 RX ADMIN — METOPROLOL TARTRATE 5 MG: 1 INJECTION, SOLUTION INTRAVENOUS at 18:26

## 2020-11-27 RX ADMIN — NOREPINEPHRINE BITARTRATE 2 MCG/MIN: 1 INJECTION, SOLUTION, CONCENTRATE INTRAVENOUS at 22:01

## 2020-11-27 RX ADMIN — PIPERACILLIN AND TAZOBACTAM 3.38 G: 3; .375 INJECTION, POWDER, LYOPHILIZED, FOR SOLUTION INTRAVENOUS at 21:24

## 2020-11-27 RX ADMIN — METOPROLOL TARTRATE 5 MG: 5 INJECTION INTRAVENOUS at 18:26

## 2020-11-27 RX ADMIN — SODIUM CHLORIDE 1362 ML: 9 INJECTION, SOLUTION INTRAVENOUS at 17:52

## 2020-11-27 RX ADMIN — LEVOFLOXACIN 500 MG: 5 INJECTION, SOLUTION INTRAVENOUS at 19:23

## 2020-11-27 RX ADMIN — SODIUM CHLORIDE 1000 ML: 9 INJECTION, SOLUTION INTRAVENOUS at 22:58

## 2020-11-27 RX ADMIN — VANCOMYCIN 1000 MG: 1 INJECTION, SOLUTION INTRAVENOUS at 20:23

## 2020-11-27 RX ADMIN — METOPROLOL TARTRATE 5 MG: 1 INJECTION, SOLUTION INTRAVENOUS at 18:57

## 2020-11-27 RX ADMIN — METOPROLOL TARTRATE 5 MG: 5 INJECTION INTRAVENOUS at 18:57

## 2020-11-27 NOTE — ED TRIAGE NOTES
Patient arrives via [facility]. History: patient was diagnosed with lung CA 3 months ago, small spot. Patient was re-scanned this week, having weakness/deconditioning, anorexia x1 week. Minimal UOP. Afebrile. Baseline A&OX4. Currently A&Ox2. Knows name and location, unsure of date/situation.

## 2020-11-27 NOTE — ED PROVIDER NOTES
1 tablet by mouth 3 times daily. HYDROCODONE-ACETAMINOPHEN (NORCO) 7.5-325 MG PER TABLET    Take 1 tablet by mouth 2 times daily as needed for Pain. .    IPRATROPIUM-ALBUTEROL (DUONEB) 0.5-2.5 (3) MG/3ML SOLN NEBULIZER SOLUTION    Inhale 3 mLs into the lungs every 6 hours as needed for Shortness of Breath    LISINOPRIL (PRINIVIL;ZESTRIL) 5 MG TABLET    Take 5 mg by mouth daily    LORATADINE (CLARITIN) 10 MG TABLET    Take 10 mg by mouth daily    METOPROLOL TARTRATE (LOPRESSOR) 25 MG TABLET    Take 25 mg by mouth daily    OXYGEN    Inhale 2 L into the lungs daily     PANTOPRAZOLE (PROTONIX) 40 MG TABLET    Take 1 tablet by mouth daily. SIMVASTATIN (ZOCOR) 20 MG TABLET    Take 1 tablet by mouth nightly    VITAMIN D (ERGOCALCIFEROL) 11211 UNITS CAPSULE    Take 1 capsule by mouth once a week       ALLERGIES     Iodine    FAMILY HISTORY     History reviewed. No pertinent family history. SOCIAL HISTORY       Social History     Socioeconomic History    Marital status:       Spouse name: None    Number of children: None    Years of education: None    Highest education level: None   Occupational History    None   Social Needs    Financial resource strain: None    Food insecurity     Worry: None     Inability: None    Transportation needs     Medical: None     Non-medical: None   Tobacco Use    Smoking status: Former Smoker     Packs/day: 0.50     Years: 50.00     Pack years: 25.00     Types: Cigarettes     Last attempt to quit: 2015     Years since quittin.8    Smokeless tobacco: Never Used   Substance and Sexual Activity    Alcohol use: No    Drug use: No    Sexual activity: None   Lifestyle    Physical activity     Days per week: None     Minutes per session: None    Stress: None   Relationships    Social connections     Talks on phone: None     Gets together: None     Attends [employer] service: None     Active member of club or organization: None     Attends meetings of clubs or organizations: None     Relationship status: None    Intimate partner violence     Fear of current or ex partner: None     Emotionally abused: None     Physically abused: None     Forced sexual activity: None   Other Topics Concern    None   Social History Narrative    None         PHYSICAL EXAM    (up to 7 for level 4, 8 or more for level 5)     ED Triage Vitals   BP Temp Temp Source Pulse Resp SpO2 Height Weight   11/27/20 1700 11/27/20 1700 11/27/20 1700 11/27/20 1700 11/27/20 1700 -- 11/27/20 1700 11/27/20 1648   (!) 105/50 97.1 °F (36.2 °C) Temporal 125 24  5' 4\" (1.626 m) 100 lb (45.4 kg)       Physical Exam  General :Patient is arousable with tactile and verbal stimuli however she will not open her eyes and is only done so once while here and that was during sternal rub which she did push my hand away and advised me not to attack her. Very thin ill-appearing patient  HEENT: Pupils are equally round and reactive to light, EOMI, conjunctivae clear. Oral mucosa is moist, no exudate. Uvula is midline  Cardiac: Heart regular rate, rhythm, no murmurs, rubs, or gallops  Lungs: Lungs are clear to auscultation, there is no wheezing, rhonchi, or rales. There is no use of accessory muscles. Abdomen: Abdomen is soft, nontender, nondistended. There is no firm or pulsatile masses, no rebound rigidity or guarding. Musculoskeletal: 5 out of 5 strength in all 4 extremities. No focal muscle deficits are appreciated  Neuro: Motor intact, sensory intact, level of consciousness is decreased however again she does respond to tactile and verbal stimuli and she will attempt to talk to you as long as you are using painful stimuli otherwise she will not try to compensate at all she will not follow simple commands however she does respond to painful stimuli and will push her hand away.   Dermatology: Skin is warm and dry  Psych: Unable to assess secondary to patient's mental status and critical condition      DIAGNOSTIC RESULTS EKG: All EKG's are interpreted by the Emergency Department Physician who either signs or Co-signs this chart in the [address] a cardiologist.    The EKG interpreted by me shows sinus tachycardia with PACs. Prolonged QT. Ventricular rate 119 bpm, IN interval is 128 ms, QRS duration 68 ms, QT/QTc interval is 356/501 ms, no acute T wave inversions concerning for acute myocardial ischemia. No ST elevations concerning for acute myocardial infarction. RADIOLOGY:   Non-plain film images such as CT, Ultrasound and MRI are read by the radiologist. Plain radiographic images are visualized and preliminarily interpreted by the emergency physician with the below findings:      ? Radiologist's Report Reviewed:  XR CHEST PORTABLE   Final Result      No acute pulmonary disease. CT Head WO Contrast   Final Result      1. No acute intracranial process.       CT ABDOMEN PELVIS WO CONTRAST Additional Contrast? None    (Results Pending)         ED BEDSIDE ULTRASOUND:   Performed by ED Physician - none    LABS:    I have reviewed and interpreted all of the currently available lab results from this visit (ifapplicable):  Results for orders placed or performed during the hospital encounter of 11/27/20   CBC Auto Differential   Result Value Ref Range    WBC 55.0 (HH) 4.0 - 11.0 K/uL    RBC 3.06 (L) 3.80 - 5.80 M/uL    Hemoglobin 9.0 (L) 11.5 - 16.5 g/dL    Hematocrit 31.5 (L) 37.0 - 47.0 %    .9 (H) 80.0 - 100.0 fL    MCH 29.4 27.0 - 32.0 pg    MCHC 28.6 (L) 31.0 - 35.0 g/dL    RDW 13.3 11.0 - 16.0 %    Platelets 150 (H) 312 - 400 K/uL    MPV 10.8 (H) 6.0 - 10.0 fL    Neutrophils % 90.4 %    Immature Granulocytes % 2.5 0.0 - 5.0 %    Lymphocytes % 1.8 %    Monocytes % 4.9 %    Eosinophils % 0.0 %    Basophils % 0.4 %    Neutrophils Absolute 49.7 (H) 2.0 - 7.5 K/uL    Immature Granulocytes # 1.4 K/uL    Lymphocytes Absolute 1.0 (L) 1.5 - 4.0 K/uL    Monocytes Absolute 2.7 (H) 0.2 - 0.8 K/uL    Eosinophils Absolute 0.0 0.0 - 0.4 K/uL    Basophils Absolute 0.2 (H) 0.0 - 0.1 K/uL   Comprehensive Metabolic Panel w/ Reflex to MG   Result Value Ref Range    Sodium 131 (L) 136 - 145 mmol/L    Potassium reflex Magnesium 4.9 3.4 - 5.1 mmol/L    Chloride 86 (L) 98 - 107 mmol/L    CO2 39 (H) 20 - 30 mmol/L    Anion Gap 6 3 - 16    Glucose 148 (H) 74 - 106 mg/dL    BUN 38 (H) 6 - 20 mg/dL    CREATININE 1.4 (H) 0.4 - 1.2 mg/dL    GFR Non-African American 36 (L) >59    GFR  43 (L) >59    Calcium 9.0 8.5 - 10.5 mg/dL    Total Protein 6.0 (L) 6.4 - 8.3 g/dL    Alb 2.5 (L) 3.4 - 4.8 g/dL    Albumin/Globulin Ratio 0.7 (L) 0.8 - 2.0    Total Bilirubin 0.3 0.3 - 1.2 mg/dL    Alkaline Phosphatase 132 (H) 25 - 100 U/L    ALT 12 4 - 36 U/L    AST 18 8 - 33 U/L    Globulin 3.5 g/dL   Troponin   Result Value Ref Range    Troponin <0.30 <0.30 ng/mL   Lactic Acid, Plasma   Result Value Ref Range    Lactic Acid 1.8 0.4 - 2.0 mmol/L   SPECIMEN REJECTION   Result Value Ref Range    Rejected Test NH3     Reason for Rejection see below    Ammonia   Result Value Ref Range    Ammonia 40 19 - 87 mcg/dL        All other labs were within normal range or not returned as of this dictation.     EMERGENCY DEPARTMENT COURSE and DIFFERENTIAL DIAGNOSIS/MDM:   Vitals:    Vitals:    11/27/20 1648 11/27/20 1700 11/27/20 1715 11/27/20 1730   BP:  (!) 105/50 (!) 62/36 (!) 66/35   Pulse:  125 91 95   Resp:  24 25 22   Temp:  97.1 °F (36.2 °C)     TempSrc:  Temporal     SpO2:   98% 96%   Weight: 100 lb (45.4 kg) 100 lb (45.4 kg)     Height:  5' 4\" (1.626 m)         MEDICATIONS ADMINISTERED IN ED:  Medications   piperacillin-tazobactam (ZOSYN) 3.375 g in dextrose 5 % 50 mL IVPB (mini-bag) (has no administration in time range)   vancomycin 1000 mg IVPB in 250 mL D5W addavial (has no administration in time range)   levoFLOXacin (LEVAQUIN) 500 MG/100ML infusion 500 mg (500 mg Intravenous New Bag 11/27/20 1923)   0.9 % sodium chloride IV bolus 1,362 mL (1,362 mLs Intravenous New Bag 11/27/20 1752)   metoprolol (LOPRESSOR) injection 5 mg (5 mg Intravenous Given 11/27/20 1826)   metoprolol (LOPRESSOR) injection 5 mg (5 mg Intravenous Given 11/27/20 1857)       Initial evaluation and examination I did have a conversation with the patient about the upcoming plan, treatment possible disposition but I am not sure exactly how much of the conversation the patient even retained her if she understood what I was saying. Patient advised we give her fluid bolus of normal saline at the 0.3 mg/kg sepsis dosing since she did arrive here hypotensive. Patient was brought in on dopamine which we did leave running initially I was going to wean that off as we continued fluid resuscitation. Ever the patient started to go extremely tachycardic when she is arrived she was in the 1 teens and 120s but she got as high as the 180s so the dopamine had to be abruptly stopped. Patient was hypotensive and tachycardic with atrial fib with RVR which she does have a history of A. fib in the past.  Patient is on metoprolol at home for her atrial fib. After long deliberation consideration I did go ahead and give the patient some IV Lopressor since her heart rate was still high there is no way that she could have sustained ejection fracture and thought that perhaps with a slowing of the heart rate that her actual blood pressure would improve. Patient was initially given 5 mg of Lopressor. Her heart rate did improve down into the 70 to low 100 range and atrial for. Her blood pressure actually increased from the 60 systolic range up into the 25B systolic. Patient continue with fluid boluses. Patient did drift back up into the upper 120s and 130s was given additional dose of Lopressor 5 mg which seems to have rate controlled at this time. Patient blood pressure is still hypotensive but is receiving a fluid boluses. She had 18-gauge established by EMS in the [facility].   She had secondary IV established here also. Patient advised that we would okay CT scan of the head and portable chest radiograph. Patient will have CBC, CMP, troponin, UA, lactic acid, blood cultures x2, ammonia level and a twelve-lead EKG performed. Patient is final disposition be determined once her radiological diagnostic studies been performed reviewed but based on the patient's presentation and critical condition with hypotensive/sepsis type presentation I do believe she will have to be transferred to a facility of higher level care for ICU. Blood work showed white count of 55,000, hemoglobin is 9.0, hematocrit 31.5, platelet counts 608. Comprehensive metabolic panel was benign except for sodium 131, chloride low at 86, CO2 elevated at 39, glucose of 148 elevated, BUN of 38, creatinine 1.4 and a alkaline phosphatase 132. Patient's troponin was nondetectable less than 0.30. Lactic acid is normal at 1.8. Ammonia level was normal at 40. After the patient's white count returned at 55,000 we did go ahead and start her on Zosyn, [address] and Levaquin. We also added a CT scan of the abdomen and pelvis for evaluation to make sure there was no intra-abdominal abscess or process noted there. Patient's disposition work-up will not be completed prior to shift change. Patient's final disposition be determined by the oncoming physician at shift change. Patient's critical condition and results have been discussed with oncoming physician. ED crit    CRITICAL CARE:  The high probability of sudden, clinically significant deterioration in the patient's condition required the highest level of my preparedness to intervene urgently. The services I provided to this patient were to treat and/or prevent clinically significant deterioration that could result in: Cardiac arrest and/or death.   Services included the following: chart data review, reviewing nursing notes and/or old charts, documentation time, consultant collaboration regarding findings and treatment options, medication orders and management, direct patient care, re-evaluations, vital sign assessments and ordering, interpreting and reviewing diagnostic studies/lab tests. Aggregate critical care time was 60 minutes, which includes only time during which I was engaged in work directly related to the patient's care, as described above, whether at the bedside or elsewhere in the Emergency Department. It did not include time spent performing other reported procedures or the services of residents, students, nurses or physician assistants. 1900  I have signed out [facility] Emergency Department care to [place]. We discussed the pertinent history, physical exam, completed/pending test results (if applicable) and current treatment plan. Please refer to his/her chart for the patients remaining Emergency Department course and final disposition. CONSULTS:  None    PROCEDURES:  Procedures    CRITICAL CARE TIME    Total Critical Care time was 0 minutes, excluding separately reportable procedures. There was a high probability of clinically significant/life threatening deterioration in the patient's condition which required my urgent intervention. FINAL IMPRESSION      1. Septicemia ([address]    2. Hypotension, unspecified hypotension type    3. Altered mental status, unspecified altered mental status type    4. Dehydration    5. Acute renal failure, unspecified acute renal failure type ([address]          DISPOSITION/PLAN   DISPOSITION        PATIENT REFERRED TO:  No follow-up provider specified. DISCHARGE MEDICATIONS:  New Prescriptions    No medications on file       Comment: Please note this report has been produced using speech recognition software and may contain errorsrelated to that system including errors in grammar, punctuation, and spelling, as well as words and phrases that may be inappropriate.  If there are any questions or concerns please feel free to contact the dictating providerfor clarification.     David Mejía DO  Attending Emergency Physician              David Mejía DO  11/27/20 2006

## 2020-11-28 NOTE — ED PROVIDER NOTES
Emergency Department Encounter  Location: [address] [place] [address] Court    Patient: Teresa Patel  MRN: [id number]  : 1935  Date of evaluation:   ED Provider: Amara Navas MD    [address] [place] [address]  was checked out to me by Dr. Manoj Guadalupe. Please see his/her initial documentation for details of the patient's initial ED presentation, physical exam and completed studies. In brief, Teresa Patel is a 82 y.o. female that presented to the emergency department with increasing lethargy x 1 week. She lives at home with family. Family is concerned about a pulmonary nodule that has not been biopsied. She has been seen in the ER for FTT and dehydrations several times over the last few months. Her BP was low upon presentation. EMS started her on Dopamine instead of giving her fluids so she arrived tachycardic and her Atrial fibrillation was triggered. Dr. Manoj Guadalupe you gave her Lopressor 10mg to improve her HR despite her hypotension. Her BP is now 77/41. Her WBC is 55. She was given Vanc, Zosyn and Levaquin.       I have reviewed and interpreted all of the currently available lab results and diagnostics from this visit:  Results for orders placed or performed during the hospital encounter of 20   CBC Auto Differential   Result Value Ref Range    WBC 55.0 (HH) 4.0 - 11.0 K/uL    RBC 3.06 (L) 3.80 - 5.80 M/uL    Hemoglobin 9.0 (L) 11.5 - 16.5 g/dL    Hematocrit 31.5 (L) 37.0 - 47.0 %    .9 (H) 80.0 - 100.0 fL    MCH 29.4 27.0 - 32.0 pg    MCHC 28.6 (L) 31.0 - 35.0 g/dL    RDW 13.3 11.0 - 16.0 %    Platelets 546 (H) 198 - 400 K/uL    MPV 10.8 (H) 6.0 - 10.0 fL    Neutrophils % 90.4 %    Immature Granulocytes % 2.5 0.0 - 5.0 %    Lymphocytes % 1.8 %    Monocytes % 4.9 %    Eosinophils % 0.0 %    Basophils % 0.4 %    Neutrophils Absolute 49.7 (H) 2.0 - 7.5 K/uL    Immature Granulocytes # 1.4 K/uL    Lymphocytes Absolute 1.0 (L) 1.5 - 4.0 K/uL    Monocytes Absolute 2.7 (H) 0.2 - 0.8 K/uL Eosinophils Absolute 0.0 0.0 - 0.4 K/uL    Basophils Absolute 0.2 (H) 0.0 - 0.1 K/uL   Comprehensive Metabolic Panel w/ Reflex to MG   Result Value Ref Range    Sodium 131 (L) 136 - 145 mmol/L    Potassium reflex Magnesium 4.9 3.4 - 5.1 mmol/L    Chloride 86 (L) 98 - 107 mmol/L    CO2 39 (H) 20 - 30 mmol/L    Anion Gap 6 3 - 16    Glucose 148 (H) 74 - 106 mg/dL    BUN 38 (H) 6 - 20 mg/dL    CREATININE 1.4 (H) 0.4 - 1.2 mg/dL    GFR Non-African American 36 (L) >59    GFR  43 (L) >59    Calcium 9.0 8.5 - 10.5 mg/dL    Total Protein 6.0 (L) 6.4 - 8.3 g/dL    Alb 2.5 (L) 3.4 - 4.8 g/dL    Albumin/Globulin Ratio 0.7 (L) 0.8 - 2.0    Total Bilirubin 0.3 0.3 - 1.2 mg/dL    Alkaline Phosphatase 132 (H) 25 - 100 U/L    ALT 12 4 - 36 U/L    AST 18 8 - 33 U/L    Globulin 3.5 g/dL   Troponin   Result Value Ref Range    Troponin <0.30 <0.30 ng/mL   Urinalysis Reflex to Culture    Specimen: Urine, indwelling catheter   Result Value Ref Range    Color, UA Yellow Straw/Yellow    Clarity, UA Clear Clear    Glucose, Ur Negative Negative mg/dL    Bilirubin Urine Negative Negative    Ketones, Urine Negative Negative mg/dL    Specific Gravity, UA 1.020 1.005 - 1.030    Blood, Urine Negative Negative    pH, UA 5.0 5.0 - 8.0    Protein, UA 30 (A) Negative mg/dL    Urobilinogen, Urine 1.0 <2.0 E.U./dL    Nitrite, Urine POSITIVE (A) Negative    Leukocyte Esterase, Urine TRACE (A) Negative    Microscopic Examination YES     Urine Type Gil     Urine Reflex to Culture Yes    Lactic Acid, Plasma   Result Value Ref Range    Lactic Acid 1.8 0.4 - 2.0 mmol/L   SPECIMEN REJECTION   Result Value Ref Range    Rejected Test NH3     Reason for Rejection see below    Ammonia   Result Value Ref Range    Ammonia 40 19 - 87 mcg/dL   Microscopic Urinalysis   Result Value Ref Range    WBC, UA 10-20 (A) 0 - 5 /HPF    RBC, UA 0-2 0 - 4 /HPF    Epithelial Cells, UA 0-1 0 - 5 /HPF    Bacteria, UA 3+ (A) None Seen /HPF     Ct Abdomen Pelvis Wo Contrast Additional Contrast? None    Result Date: 11/27/2020  EXAM: CT ABDOMEN AND PELVIS WITHOUT CONTRAST INDICATION: Leukocytosis COMPARISON: 8/26/2020 TECHNIQUE: Axial CT imaging obtained from lung bases through pelvis. Axial images and multiplanar reformatted images are provided for review. Up-to-date CT equipment and radiation dose reduction techniques were employed. IV Contrast: None. Oral Contrast: No. FINDINGS: There are small bilateral pleural effusions. There is a partially imaged 1 cm cavitary lesion in the right lower lobe with adjacent bronchiectasis. The heart size is normal without pericardial effusion. Noncontrast images of the liver appear normal without biliary ductal dilatation. The gallbladder appears normal. Noncontrast images of the spleen, pancreas, and adrenal glands appear normal. No stones are identified in either kidney or along the course of either ureter and no hydronephrosis or hydroureter is seen. The urinary bladder is incompletely distended and appears normal. There is an 11 cm round thick-walled fluid and air containing mass in the pelvis which may be contiguous with the uterus, new compared to prior. The stomach, small and large bowel are normal in caliber without indication of obstruction. Prior partial colonic resection is noted. The appendix appears normal. No free intraperitoneal air is seen. No lymphadenopathy is seen. The aorta is atherosclerotic. A 4.7 cm fusiform infrarenal abdominal aortic aneurysm appears unchanged. Bone windows demonstrate no suspicious lytic or blastic lesions. Left femoral intramedullary babita is partially imaged. There is moderate lower lumbar spondylosis. 1.  Noncontrast CT demonstrating 11 cm thick-walled mass containing fluid and air in the pelvis which may represent an abscess or malignancy. This may be in connection with the uterus or small bowel. Oral and IV contrast may be helpful for further evaluation.  2.  Small bilateral pleural in the ED during jeffries placement. 2130  Called the [facility] for transfer. 2140  Deny Dean MD for transfer. Patient had been 2.5 liters of fluids and was still hypotensive so levophed was started. 2155  Dr. Brittney Ye, [facility] surgery, accepted the patient for transfer to [facility] ER for further evaluation.        ED Medication Orders (From admission, onward)    Start Ordered     Status Ordering Provider    11/27/20 2145 11/27/20 2144  norepinephrine (LEVOPHED) 16 mg in dextrose 5 % 250 mL infusion  CONTINUOUS      Ordered DARIEN GUTHRIE    30/28/29 6046 11/27/20 2028  0.9 % sodium chloride bolus  ONCE      Acknowledged DARIEN GUTHRIE    25/33/34 1914 11/27/20 1905  piperacillin-tazobactam (ZOSYN) 3.375 g in dextrose 5 % 50 mL IVPB (mini-bag)  ONCE     Question Answer Comment   Antimicrobial Indications Other    Other Abx Indication sepsis        Last MAR action:  New Bag - by Akilah Garza on 11/27/20 at [address], [facility]    11/27/20 1915 11/27/20 1905  vancomycin 1000 mg IVPB in 250 mL D5W addavial  ONCE     Question Answer Comment   Antimicrobial Indications Other    Other Abx Indication sepsis-unknown source        Last MAR action:  Stopped - by Akilah Garza on 11/27/20 at [facility]    11/27/20 1915 11/27/20 1905  levoFLOXacin (LEVAQUIN) 500 MG/100ML infusion 500 mg  ONCE     Question Answer Comment   Antimicrobial Indications Other    Other Abx Indication sepsis-unknown sourse        Last MAR action:  Stopped - by Akilah Garza on 11/27/20 at [facility]    11/27/20 1900 11/27/20 1857  metoprolol (LOPRESSOR) injection 5 mg  ONCE      Last MAR action:  Given - by Akilah Garza on 11/27/20 at [address], [facility]    11/27/20 1830 11/27/20 1826  metoprolol (LOPRESSOR) injection 5 mg  ONCE      Last MAR action:  Given - by Akilah Garza on 11/27/20 at [address] [place] [address]    11/27/20 1730 11/27/20 1720  0.9 % sodium chloride IV bolus 1,362 mL  ONCE      Last MAR action:  Stopped - by CLAUDIO GIRALDO on 11/27/20 at [place], [facility]          Final Impression      1. Septicemia ([facility]    2. Hypotension, unspecified hypotension type    3. Altered mental status, unspecified altered mental status type    4. Pelvic mass    5. Postmenopausal bleeding    6. Acute kidney injury ([facility]    7.  Acute UTI        DISPOSITION       (Please note that portions of this note may have been completed with a voice recognition program. Efforts were made to edit the dictations but occasionally words are mis-transcribed.)    Coni Camargo MD  [address] Christiana Martinez MD  11/27/20 0901

## 2020-11-28 NOTE — ED NOTES
Spoke with [facility] has accepted patient 15 min eta plus lift.       Rosita Ferreira RN  11/27/20 3406

## 2020-11-28 NOTE — ED NOTES
Report called to [facility] at this time.       Taylor Ventura RN  11/27/20 6997

## 2020-11-28 NOTE — ED NOTES
Placed call to [facility] and spoke with Layne Vasquez. [facility] has a 43 min eta with lift. Joy is checking flight time with PHI and will return call back. Luciana Alejandre RN aware.       Marlon Blakely RN  11/27/20 7869

## 2020-12-01 LAB
ORGANISM: ABNORMAL
URINE CULTURE, ROUTINE: ABNORMAL

## 2020-12-02 LAB — CULTURE, BLOOD 2: NORMAL

## 2020-12-22 DIAGNOSIS — R91.1 LUNG NODULE SEEN ON IMAGING STUDY: ICD-10-CM

## 2020-12-23 ENCOUNTER — TELEPHONE (OUTPATIENT)
Dept: PULMONOLOGY | Facility: CLINIC | Age: 85
End: 2020-12-23

## 2020-12-23 NOTE — TELEPHONE ENCOUNTER
Pet scan report shows hypermetabolic RML lesion that is pleural based. It is unclear if this is infectious versus neoplastic.  Also right hilar lymph node with SUV 3.4.    I attempted to call patient but no answer.    I will request disc to personally review the films to determine if the right hilar lymph node is amenable to bronchoscopy.  However, the report describes a pleural-based lesion which would most likely be more amenable to CT-guided biopsy.    The patient's daughter, Yolanda Ramsay, called back.  We discussed PET scan results and she was appreciative of the information.  She states that her mother says that due to her age she does not want to do any kind of work-up.  She just wanted the information.  She has the disc from the PET scan and will drop it off at our office.  Discussed that we will review this PET scan together during her February appointment.  She was appreciative of the information.

## 2021-01-26 ENCOUNTER — HOSPITAL ENCOUNTER (OUTPATIENT)
Dept: CT IMAGING | Facility: HOSPITAL | Age: 86
Discharge: HOME | End: 2021-01-26
Payer: MEDICARE

## 2021-01-26 DIAGNOSIS — N73.0 ACUTE PARAMETRITIS AND PELVIC CELLULITIS: ICD-10-CM

## 2021-01-26 PROCEDURE — 74176 CT ABD & PELVIS W/O CONTRAST: CPT

## 2021-02-02 NOTE — PROGRESS NOTES
Patient: Jennifer Crabtree    YOB: 1935    Date: 02/04/2021    Primary Care Provider: Izzy Bañuelos APRN    Chief Complaint   Patient presents with   • Foot Ulcer       SUBJECTIVE:    History of present illness:  Patient is in the office today for evaluation and consultation for right foot ulcer on her great toe. Patient states that she first noticed the ulcer in November.  It has healed up in the past but has come back and increased in size.  She complains of pain but denies drainage.  Patient has very cool right foot with redness and dependent rubor.  Pain at night especially, patient has to hang her foot on the side of the bed for relief.  Patient with absent pulses and history of smoking but quit recently.    The following portions of the patient's history were reviewed and updated as appropriate: allergies, current medications, past family history, past medical history, past social history, past surgical history and problem list.      Review of Systems   Constitutional: Negative for chills, fever and unexpected weight change.   HENT: Negative for hearing loss, trouble swallowing and voice change.    Eyes: Negative for visual disturbance.   Respiratory: Negative for apnea, cough, chest tightness, shortness of breath and wheezing.    Cardiovascular: Negative for chest pain, palpitations and leg swelling.   Gastrointestinal: Negative for abdominal distention, abdominal pain, anal bleeding, blood in stool, constipation, diarrhea, nausea, rectal pain and vomiting.   Endocrine: Negative for cold intolerance and heat intolerance.   Genitourinary: Negative for difficulty urinating, dysuria and flank pain.   Musculoskeletal: Negative for back pain and gait problem.   Skin: Positive for color change and wound. Negative for rash.   Neurological: Negative for dizziness, syncope, speech difficulty, weakness, light-headedness, numbness and headaches.   Hematological: Negative for adenopathy. Does not  bruise/bleed easily.   Psychiatric/Behavioral: Negative for confusion. The patient is not nervous/anxious.        Allergies:  Allergies   Allergen Reactions   • Iodine Swelling      REDNESS  AND HAD ITCHING        Medications:    Current Outpatient Medications:   •  clopidogrel (PLAVIX) 75 MG tablet, , Disp: , Rfl: 5  •  diazePAM (VALIUM) 2 MG tablet, Take 2 mg by mouth., Disp: , Rfl:   •  ferrous sulfate 324 (65 Fe) MG tablet delayed-release EC tablet, Take 324 mg by mouth., Disp: , Rfl:   •  Fluticasone-Umeclidin-Vilant (Trelegy Ellipta) 100-62.5-25 MCG/INH aerosol powder , Inhale 1 puff Daily for 90 days., Disp: 3 each, Rfl: 3  •  folic acid (FOLVITE) 1 MG tablet, Take 1 mg by mouth., Disp: , Rfl:   •  gabapentin (NEURONTIN) 800 MG tablet, , Disp: , Rfl: 2  •  HYDROcodone-acetaminophen (NORCO)  MG per tablet, Every 6 (Six) Hours., Disp: , Rfl:   •  ipratropium-albuterol (DUONEB) 0.5-2.5 mg/mL nebulizer, Every 6 (Six) Hours., Disp: , Rfl:   •  lisinopril (PRINIVIL,ZESTRIL) 5 MG tablet, Take 5 mg by mouth., Disp: , Rfl:   •  metoprolol tartrate (LOPRESSOR) 25 MG tablet, Take 25 mg by mouth., Disp: , Rfl:   •  O2 (OXYGEN), Inhale 2 L into the lungs nightly., Disp: , Rfl:   •  O2 (OXYGEN), Inhale 2 L., Disp: , Rfl:   •  omeprazole (PrilOSEC) 40 MG capsule, Take 1 capsule by mouth Daily., Disp: 90 capsule, Rfl: 3  •  pantoprazole (PROTONIX) 40 MG EC tablet, Take 40 mg by mouth., Disp: , Rfl:   •  simvastatin (ZOCOR) 20 MG tablet, Take 1 tablet by mouth nightly, Disp: , Rfl:   •  loratadine (CLARITIN) 10 MG tablet, Take 10 mg by mouth., Disp: , Rfl:     History:  Past Medical History:   Diagnosis Date   • Asthma    • Cancer (CMS/HCC)     SKIN CANCER ON LEFT TEMPLE   • COPD (chronic obstructive pulmonary disease) (CMS/HCC)    • Hypertension    • Myocardial infarction (CMS/HCC)    • Stroke (CMS/HCC)        Past Surgical History:   Procedure Laterality Date   • ANKLE OPEN REDUCTION INTERNAL FIXATION     • COLON  "SURGERY     • EXCISION LESION WITH SPLIT THICKNESS SKIN GRAFT Left 4/28/2017    Procedure: EXCISION LESION WITH SPLIT THICKNESS SKIN GRAFT LEFT TEMPLE;  Surgeon: Javier Aguayo MD;  Location: [facility];  Service:    • KNEE ARTHROSCOPY     • LEG SURGERY         Family History   Problem Relation Age of Onset   • Cancer Brother         prostate   • Cancer Mother    • No Known Problems Father        Social History     Tobacco Use   • Smoking status: Current Some Day Smoker     Years: 50.00   • Smokeless tobacco: Never Used   • Tobacco comment: QUIT 2 YEARS AGO- States she smokes 1-2 cigarettes a day   Substance Use Topics   • Alcohol use: No     Comment: HX OF ALCOHOL ABUSE   • Drug use: No        OBJECTIVE:    Vital Signs:   Vitals:    02/04/21 1013   BP: 138/89   Pulse: 55   Temp: 98 °F (36.7 °C)   TempSrc: Temporal   SpO2: 96%   Weight: 43.5 kg (96 lb)   Height: 165.1 cm (65\")       Physical Exam:   General Appearance:    Alert, cooperative, in no acute distress   Head:    Normocephalic, without obvious abnormality, atraumatic   Eyes:            Lids and lashes normal, conjunctivae and sclerae normal, no   icterus, no pallor, corneas clear, PERRLA   Ears:    Ears appear intact with no abnormalities noted   Throat:   No oral lesions, no thrush, oral mucosa moist   Neck:   No adenopathy, supple, trachea midline, no thyromegaly, no   carotid bruit, no JVD   Lungs:     Clear to auscultation,respirations regular, even and                  unlabored    Heart:    Regular rhythm and normal rate, normal S1 and S2, no            murmur, no gallop, no rub, no click   Chest Wall:    No abnormalities observed   Abdomen:     Normal bowel sounds, no masses, no organomegaly, soft        non-tender, non-distended, no guarding, no rebound                tenderness   Extremities:   Moves all extremities well, no edema, no cyanosis, no             Redness.  Right foot small ulcer at the tip of the big toe on the right foot.  No redness " or drainage.  Small eschar.   Pulses:   Pulses not palpable bilaterally.   Skin:   No bleeding, bruising or rash   Lymph nodes:   No palpable adenopathy   Neurologic:   Cranial nerves 2 - 12 grossly intact, sensation intact, DTR       present and equal bilaterally     Results Review:   I reviewed the patient's new clinical results.    Review of Systems was reviewed and confirmed as accurate as documented by the MA.    ASSESSMENT/PLAN:    1. PVD (peripheral vascular disease) (CMS/HCC)    2. Ulcer of right foot, limited to breakdown of skin (CMS/HCC)        Patient has significant peripheral vascular disease with rest pain right foot and ulcer.  Recommend evaluation by cardiology for angiogram and stent placement if possible.  Patient at risk for losing her right leg due to severe disease.  Patient told not to stop smoking cigarettes again and continue exercise as much as possible.  Also very small, just needs dry dressing only.    I discussed the patients findings and my recommendations with patient        Electronically signed by Javier Aguayo MD  02/04/21    Portions of this note have been scribed for Javier Aguayo MD by Michelle Milligan. 2/4/2021  10:34 EST

## 2021-02-04 ENCOUNTER — OFFICE VISIT (OUTPATIENT)
Dept: SURGERY | Facility: CLINIC | Age: 86
End: 2021-02-04

## 2021-02-04 VITALS
HEART RATE: 55 BPM | BODY MASS INDEX: 15.99 KG/M2 | HEIGHT: 65 IN | DIASTOLIC BLOOD PRESSURE: 89 MMHG | SYSTOLIC BLOOD PRESSURE: 138 MMHG | OXYGEN SATURATION: 96 % | TEMPERATURE: 98 F | WEIGHT: 96 LBS

## 2021-02-04 DIAGNOSIS — L97.511 ULCER OF RIGHT FOOT, LIMITED TO BREAKDOWN OF SKIN (HCC): ICD-10-CM

## 2021-02-04 DIAGNOSIS — I73.9 PVD (PERIPHERAL VASCULAR DISEASE) (HCC): Primary | ICD-10-CM

## 2021-02-04 PROCEDURE — 99214 OFFICE O/P EST MOD 30 MIN: CPT | Performed by: SURGERY

## 2021-02-04 RX ORDER — LORATADINE 10 MG/1
10 TABLET ORAL
COMMUNITY

## 2021-03-02 ENCOUNTER — TELEPHONE (OUTPATIENT)
Dept: SURGERY | Facility: CLINIC | Age: 86
End: 2021-03-02

## 2021-03-02 NOTE — TELEPHONE ENCOUNTER
"Pt's daughter called, she stated \"I am calling about a referral to a Doctor in [place] about her bad circulation in her legs, we have not heard anything yet.\"  "

## 2021-04-29 ENCOUNTER — APPOINTMENT (OUTPATIENT)
Dept: GENERAL RADIOLOGY | Facility: HOSPITAL | Age: 86
End: 2021-04-29
Payer: MEDICARE

## 2021-04-29 ENCOUNTER — HOSPITAL ENCOUNTER (EMERGENCY)
Facility: HOSPITAL | Age: 86
Discharge: HOME | End: 2021-04-29
Attending: HOSPITALIST
Payer: MEDICARE

## 2021-04-29 VITALS
WEIGHT: 100 LBS | HEART RATE: 89 BPM | DIASTOLIC BLOOD PRESSURE: 87 MMHG | TEMPERATURE: 97.4 F | HEIGHT: 60 IN | BODY MASS INDEX: 19.63 KG/M2 | OXYGEN SATURATION: 96 % | RESPIRATION RATE: 14 BRPM | SYSTOLIC BLOOD PRESSURE: 164 MMHG

## 2021-04-29 DIAGNOSIS — R11.2 NAUSEA AND VOMITING, INTRACTABILITY OF VOMITING NOT SPECIFIED, UNSPECIFIED VOMITING TYPE: ICD-10-CM

## 2021-04-29 DIAGNOSIS — J44.9 COPD NOT AFFECTING CURRENT EPISODE OF CARE (HCC): ICD-10-CM

## 2021-04-29 DIAGNOSIS — N30.00 ACUTE CYSTITIS WITHOUT HEMATURIA: Primary | ICD-10-CM

## 2021-04-29 LAB
A/G RATIO: 1.2 (ref 0.8–2)
ALBUMIN SERPL-MCNC: 4.1 G/DL (ref 3.4–4.8)
ALP BLD-CCNC: 124 U/L (ref 25–100)
ALT SERPL-CCNC: <5 U/L (ref 4–36)
ANION GAP SERPL CALCULATED.3IONS-SCNC: 7 MMOL/L (ref 3–16)
AST SERPL-CCNC: 14 U/L (ref 8–33)
BACTERIA: ABNORMAL /HPF
BASOPHILS ABSOLUTE: 0 K/UL (ref 0–0.1)
BASOPHILS RELATIVE PERCENT: 0.6 %
BILIRUB SERPL-MCNC: 0.5 MG/DL (ref 0.3–1.2)
BILIRUBIN URINE: ABNORMAL
BLOOD, URINE: ABNORMAL
BUN BLDV-MCNC: 11 MG/DL (ref 6–20)
CALCIUM SERPL-MCNC: 10 MG/DL (ref 8.5–10.5)
CHLORIDE BLD-SCNC: 94 MMOL/L (ref 98–107)
CLARITY: ABNORMAL
CO2: 40 MMOL/L (ref 20–30)
COLOR: YELLOW
CREAT SERPL-MCNC: <0.5 MG/DL (ref 0.4–1.2)
EOSINOPHILS ABSOLUTE: 0.1 K/UL (ref 0–0.4)
EOSINOPHILS RELATIVE PERCENT: 0.7 %
EPITHELIAL CELLS, UA: ABNORMAL /HPF (ref 0–5)
GFR AFRICAN AMERICAN: >59
GFR NON-AFRICAN AMERICAN: >60
GLOBULIN: 3.3 G/DL
GLUCOSE BLD-MCNC: 99 MG/DL (ref 74–106)
GLUCOSE URINE: NEGATIVE MG/DL
HCT VFR BLD CALC: 43.5 % (ref 37–47)
HEMOGLOBIN: 13.2 G/DL (ref 11.5–16.5)
IMMATURE GRANULOCYTES #: 0 K/UL
IMMATURE GRANULOCYTES %: 0.1 % (ref 0–5)
KETONES, URINE: 40 MG/DL
LEUKOCYTE ESTERASE, URINE: ABNORMAL
LIPASE: 12 U/L (ref 5.6–51.3)
LYMPHOCYTES ABSOLUTE: 0.9 K/UL (ref 1.5–4)
LYMPHOCYTES RELATIVE PERCENT: 13.1 %
MCH RBC QN AUTO: 28.6 PG (ref 27–32)
MCHC RBC AUTO-ENTMCNC: 30.3 G/DL (ref 31–35)
MCV RBC AUTO: 94.2 FL (ref 80–100)
MICROSCOPIC EXAMINATION: YES
MONOCYTES ABSOLUTE: 0.4 K/UL (ref 0.2–0.8)
MONOCYTES RELATIVE PERCENT: 5.6 %
NEUTROPHILS ABSOLUTE: 5.7 K/UL (ref 2–7.5)
NEUTROPHILS RELATIVE PERCENT: 79.9 %
NITRITE, URINE: NEGATIVE
PDW BLD-RTO: 13.7 % (ref 11–16)
PH UA: 7 (ref 5–8)
PLATELET # BLD: 195 K/UL (ref 150–400)
PMV BLD AUTO: 11.9 FL (ref 6–10)
POTASSIUM REFLEX MAGNESIUM: 4.3 MMOL/L (ref 3.4–5.1)
PRO-BNP: 235 PG/ML (ref 0–1800)
PROTEIN UA: NEGATIVE MG/DL
RAPID INFLUENZA  B AGN: NEGATIVE
RAPID INFLUENZA A AGN: NEGATIVE
RBC # BLD: 4.62 M/UL (ref 3.8–5.8)
RBC UA: ABNORMAL /HPF (ref 0–4)
SARS-COV-2, NAAT: NOT DETECTED
SODIUM BLD-SCNC: 141 MMOL/L (ref 136–145)
SPECIFIC GRAVITY UA: 1.02 (ref 1–1.03)
TOTAL PROTEIN: 7.4 G/DL (ref 6.4–8.3)
TROPONIN: <0.3 NG/ML
URINE REFLEX TO CULTURE: YES
URINE TYPE: ABNORMAL
UROBILINOGEN, URINE: 2 E.U./DL
WBC # BLD: 7.2 K/UL (ref 4–11)
WBC UA: ABNORMAL /HPF (ref 0–5)

## 2021-04-29 PROCEDURE — 81001 URINALYSIS AUTO W/SCOPE: CPT

## 2021-04-29 PROCEDURE — 96365 THER/PROPH/DIAG IV INF INIT: CPT

## 2021-04-29 PROCEDURE — 6370000000 HC RX 637 (ALT 250 FOR IP): Performed by: HOSPITALIST

## 2021-04-29 PROCEDURE — 80053 COMPREHEN METABOLIC PANEL: CPT

## 2021-04-29 PROCEDURE — 87804 INFLUENZA ASSAY W/OPTIC: CPT

## 2021-04-29 PROCEDURE — 83880 ASSAY OF NATRIURETIC PEPTIDE: CPT

## 2021-04-29 PROCEDURE — 85025 COMPLETE CBC W/AUTO DIFF WBC: CPT

## 2021-04-29 PROCEDURE — 2580000003 HC RX 258: Performed by: HOSPITALIST

## 2021-04-29 PROCEDURE — 36415 COLL VENOUS BLD VENIPUNCTURE: CPT

## 2021-04-29 PROCEDURE — 84484 ASSAY OF TROPONIN QUANT: CPT

## 2021-04-29 PROCEDURE — 87635 SARS-COV-2 COVID-19 AMP PRB: CPT

## 2021-04-29 PROCEDURE — 6360000002 HC RX W HCPCS

## 2021-04-29 PROCEDURE — 87186 SC STD MICRODIL/AGAR DIL: CPT

## 2021-04-29 PROCEDURE — 99285 EMERGENCY DEPT VISIT HI MDM: CPT

## 2021-04-29 PROCEDURE — 2500000003 HC RX 250 WO HCPCS: Performed by: HOSPITALIST

## 2021-04-29 PROCEDURE — 83690 ASSAY OF LIPASE: CPT

## 2021-04-29 PROCEDURE — 96375 TX/PRO/DX INJ NEW DRUG ADDON: CPT

## 2021-04-29 PROCEDURE — 87086 URINE CULTURE/COLONY COUNT: CPT

## 2021-04-29 PROCEDURE — 6360000002 HC RX W HCPCS: Performed by: HOSPITALIST

## 2021-04-29 PROCEDURE — 71045 X-RAY EXAM CHEST 1 VIEW: CPT

## 2021-04-29 PROCEDURE — 87088 URINE BACTERIA CULTURE: CPT

## 2021-04-29 RX ORDER — HYDROCODONE BITARTRATE AND ACETAMINOPHEN 7.5; 325 MG/1; MG/1
1 TABLET ORAL ONCE
Status: COMPLETED | OUTPATIENT
Start: 2021-04-29 | End: 2021-04-29

## 2021-04-29 RX ORDER — ONDANSETRON 2 MG/ML
INJECTION INTRAMUSCULAR; INTRAVENOUS
Status: COMPLETED
Start: 2021-04-29 | End: 2021-04-29

## 2021-04-29 RX ORDER — SIMETHICONE 80 MG
80 TABLET,CHEWABLE ORAL ONCE
Status: COMPLETED | OUTPATIENT
Start: 2021-04-29 | End: 2021-04-29

## 2021-04-29 RX ORDER — ONDANSETRON 2 MG/ML
4 INJECTION INTRAMUSCULAR; INTRAVENOUS EVERY 30 MIN PRN
Status: DISCONTINUED | OUTPATIENT
Start: 2021-04-29 | End: 2021-04-29 | Stop reason: HOSPADM

## 2021-04-29 RX ORDER — DIAZEPAM 5 MG/1
5 TABLET ORAL NIGHTLY
COMMUNITY

## 2021-04-29 RX ORDER — 0.9 % SODIUM CHLORIDE 0.9 %
500 INTRAVENOUS SOLUTION INTRAVENOUS ONCE
Status: COMPLETED | OUTPATIENT
Start: 2021-04-29 | End: 2021-04-29

## 2021-04-29 RX ADMIN — SIMETHICONE 80 MG: 80 TABLET, CHEWABLE ORAL at 09:57

## 2021-04-29 RX ADMIN — HYDROCODONE BITARTRATE AND ACETAMINOPHEN 1 TABLET: 7.5; 325 TABLET ORAL at 09:57

## 2021-04-29 RX ADMIN — FAMOTIDINE 20 MG: 10 INJECTION, SOLUTION INTRAVENOUS at 09:57

## 2021-04-29 RX ADMIN — SODIUM CHLORIDE 500 ML: 9 INJECTION, SOLUTION INTRAVENOUS at 09:22

## 2021-04-29 RX ADMIN — CEFTRIAXONE 1000 MG: 1 INJECTION, POWDER, FOR SOLUTION INTRAMUSCULAR; INTRAVENOUS at 10:14

## 2021-04-29 RX ADMIN — ONDANSETRON HYDROCHLORIDE: 2 INJECTION, SOLUTION INTRAMUSCULAR; INTRAVENOUS at 08:45

## 2021-04-29 ASSESSMENT — PAIN SCALES - GENERAL: PAINLEVEL_OUTOF10: 3

## 2021-04-29 NOTE — ED PROVIDER NOTES
[facility] ENCOUNTER      Pt Name: Ermias Chaney  MRN: [id number]  YOB: 1935  Date of evaluation: 4/29/2021  Provider: Cony Carey, [facility]       Chief Complaint   Patient presents with    Emesis    Shortness of Breath         HISTORY OF PRESENT ILLNESS  (Location/Symptom, Timing/Onset, Context/Setting, Quality, Duration, Modifying Factors, Severity.)   Ermias Chaney is a 82 y.o. female who presents to the emergency department shortness of breath and nausea vomiting. She is a pleasant 44-year-old female that presented via EMS today for evaluation of shortness of breath which she states is been probably ongoing for the past 3 to 4 days. Patient states that she does have COPD and she believes she may be having a little acute flare of this. Patient is on oxygen 2-1/2 L nasal cannula continuously at home. Patient states that last night she started to have some cold sweats in the bed but denies ever having any fever that she is aware of. She denies any sick contacts at home. She denies any chest pain with her symptoms. She states that her shortness of breath sometimes just comes and goes. She really cannot relate it to activity. Patient did have episode of vomiting prior to getting into the ambulance. Patient denies any nausea at this time. She denies any abdominal pain. Denies any dysuria. Denies any numbness tingling weakness of the extremities out of ordinary. Denies any myalgias. Patient states that she has had increased a productive cough that is been kind of clear and thick which she was able to cough some up here in the emergency department. Patient denied any concern for COVID-19. Denies any dysuria. Denies any change urinary frequency. Denies any swelling out of ordinary. Patient does have known history of atrial fibrillation but is only on Plavix.   Patient does have a history of multiple falls, coronary artery disease, history of cerebrovascular disease-CVA, she also has a history of a lung nodule along with abdominal and thoracic aortic aneurysm. Patient has had a history of a pelvic mass/abscess which she was seen here back on 11/27/2020 and into being transferred onto [address] [place] [address] for further evaluation. Family called and advised that they are seeking a possible admission for nursing facility placement if patient has medical reason for admission. Nursing notes were reviewed. REVIEW OFSYSTEMS    (2-9 systems for level 4, 10 or more for level 5)   ROS:  General:  No fevers, + chills, no weakness  Cardiovascular:  No chest pain, no palpitations  Respiratory:  +shortness of breath, +cough-productive of clear thick sputum, no wheezing  Gastrointestinal:  No pain, +nausea/vomiting, no diarrhea  Musculoskeletal:  No muscle pain, no joint pain  Skin:  No rash, no easy bruising  Neurologic:  No speech problems, no headache, no extremity weakness  Psychiatric:  No anxiety  Genitourinary:  No dysuria, no hematuria    Except as noted above the remainder of the review of systems was reviewed and negative.        PAST MEDICAL HISTORY     Past Medical History:   Diagnosis Date    Abdominal aortic aneurysm (HCC)     Anxiety     Aortic aneurysm, thoracic (HCC)     Ascending    Arthritis     CAD (coronary artery disease)     COPD (chronic obstructive pulmonary disease) (HCC)     CVD (cerebrovascular disease)     Hip fx ([address] 2005    Hip pain     Hyperlipidemia     Lung nodule 08/2020    \"spot on her lung\" unknown if it is cancer    Tobacco abuse     Unspecified cerebral artery occlusion with cerebral infarction          SURGICAL HISTORY       Past Surgical History:   Procedure Laterality Date    COLECTOMY      Benign Tumor    KNEE SURGERY Left 2017    UPPER GASTROINTESTINAL ENDOSCOPY N/A 2/20/2020    EGD BIOPSY performed by Rudolph Clark MD at [address] [place] [address]       Previous Medications    ALBUTEROL (PROVENTIL HFA;VENTOLIN HFA) 108 (90 BASE) MCG/ACT INHALER    Inhale 2 puffs into the lungs every 6 hours as needed for Wheezing. CLOPIDOGREL (PLAVIX) 75 MG TABLET    Take 1 tablet by mouth daily. DIAZEPAM (VALIUM) 5 MG TABLET    Take 5 mg by mouth nightly. FLUTICASONE FUROATE-VILANTEROL (BREO ELLIPTA) 200-25 MCG/INH AEPB    Inhale 1 puff into the lungs daily    GABAPENTIN (NEURONTIN) 800 MG TABLET    Take 1 tablet by mouth 3 times daily. HYDROCODONE-ACETAMINOPHEN (NORCO)  MG PER TABLET    Take 1 tablet by mouth 3 times daily as needed for Pain. IPRATROPIUM-ALBUTEROL (DUONEB) 0.5-2.5 (3) MG/3ML SOLN NEBULIZER SOLUTION    Inhale 3 mLs into the lungs every 6 hours as needed for Shortness of Breath    LISINOPRIL (PRINIVIL;ZESTRIL) 5 MG TABLET    Take 5 mg by mouth daily    METOPROLOL TARTRATE (LOPRESSOR) 25 MG TABLET    Take 25 mg by mouth daily    OXYGEN    Inhale 2 L into the lungs daily     SIMVASTATIN (ZOCOR) 20 MG TABLET    Take 1 tablet by mouth nightly       ALLERGIES     Iodine    FAMILY HISTORY     No family history on file. SOCIAL HISTORY       Social History     Socioeconomic History    Marital status:       Spouse name: Not on file    Number of children: Not on file    Years of education: Not on file    Highest education level: Not on file   Occupational History    Not on file   Social Needs    Financial resource strain: Not on file    Food insecurity     Worry: Not on file     Inability: Not on file    Transportation needs     Medical: Not on file     Non-medical: Not on file   Tobacco Use    Smoking status: Former Smoker     Packs/day: 0.50     Years: 50.00     Pack years: 25.00     Types: Cigarettes     Quit date: 2015     Years since quittin.2    Smokeless tobacco: Never Used   Substance and Sexual Activity    Alcohol use: No    Drug use: No    Sexual activity: Not on file   Lifestyle    Physical activity     Days per ALT <5 4 - 36 U/L    AST 14 8 - 33 U/L    Globulin 3.3 g/dL   Troponin   Result Value Ref Range    Troponin <0.30 <0.30 ng/mL   Brain Natriuretic Peptide   Result Value Ref Range    Pro- 0 - 1,800 pg/mL   Lipase   Result Value Ref Range    Lipase 12.0 5.6 - 51.3 U/L   Urinalysis Reflex to Culture    Specimen: Urine, clean catch   Result Value Ref Range    Color, UA Yellow Straw/Yellow    Clarity, UA SLCLOUDY Clear    Glucose, Ur Negative Negative mg/dL    Bilirubin Urine SMALL (A) Negative    Ketones, Urine 40 (A) Negative mg/dL    Specific Gravity, UA 1.020 1.005 - 1.030    Blood, Urine MODERATE (A) Negative    pH, UA 7.0 5.0 - 8.0    Protein, UA Negative Negative mg/dL    Urobilinogen, Urine 2.0 (A) <2.0 E.U./dL    Nitrite, Urine Negative Negative    Leukocyte Esterase, Urine MODERATE (A) Negative    Microscopic Examination YES     Urine Type Catheter     Urine Reflex to Culture Yes    Microscopic Urinalysis   Result Value Ref Range    WBC, UA 21-50 (A) 0 - 5 /HPF    RBC, UA 0-2 0 - 4 /HPF    Epithelial Cells, UA 0-1 0 - 5 /HPF    Bacteria, UA 4+ (A) None Seen /HPF        All other labs were within normal range or not returned as of this dictation.     EMERGENCY DEPARTMENT COURSE and DIFFERENTIAL DIAGNOSIS/MDM:   Vitals:    Vitals:    04/29/21 0902 04/29/21 0918 04/29/21 0936 04/29/21 1000   BP: (!) 134/105 (!) 164/93 (!) 158/104 (!) 124/98   Pulse: 102 101 96 95   Resp: 20  12 13   SpO2: 98% 95% 100% 100%   Weight:       Height:           MEDICATIONS ADMINISTERED IN ED:  Medications   ondansetron (ZOFRAN) injection 4 mg (has no administration in time range)   ondansetron (ZOFRAN) 4 MG/2ML injection (  Given by Other 4/29/21 8995)   0.9 % sodium chloride bolus (500 mLs Intravenous New Bag 4/29/21 0957)   HYDROcodone-acetaminophen (NORCO) 7.5-325 MG per tablet 1 tablet (1 tablet Oral Given 4/29/21 0701)   famotidine (PEPCID) injection 20 mg (20 mg Intravenous Given 4/29/21 6517)   simethicone (Limmie Roots) Lipase was normal at 12. CBC was not able to be performed secondary to machine malfunction    Previous laboratory study on 11/27/2020 showed a CO2 of 39 patient is still around baseline. UA showed moderate blood, moderate leukoesterase. Small meta bilirubin. Microscopy showed 21-50 white cells, 0-2 red cells,    Portable chest radiograph read by radiology as no acute abnormality or significant change in the interval    Patient's radiological diagnostic studies were discussed with her and she does state her understanding. Will be given a gram of Rocephin here for her UTI however the patient is not confused she does not have any acute symptoms she had one episode of nausea and vomiting but no further episodes here. CBC machine is down and we are unable to get any type of diagnostic value from that sample. She is not tachycardic however blood pressure has been going up and down while here but otherwise she has been comfortable the only time that she had any distress at all her discomfort was when she had the in and out cath performed per her request.  Patient will be given a dose of Rocephin here IV. We did speak with  and she is contacting the nursing facility to see if she would require a 3-day stay in the hospital or not or if she could just go directly to the nursing facility for admission if needed. I do not feel that the patient at this time really meets the requirement or criteria to be admitted to the hospital since she was actually completely asymptomatic as far as urinary symptoms went except for the nausea and vomiting but cannot find any source except for her chronic COPD for the subjective shortness of breath that she had and has not witnessed any other episodes of that here even on her normal 2-1/2 L nasal cannula she has been satting anywhere between 96 and 100%. With negative chest radiograph for pneumonic process.   Patient is alert awake and oriented x3 and she is answering questions appropriately. Case management to call back and apparently the patient is able to be admitted directly to the nursing facility without a 3-day stay. We are waiting on the patient's daughter to come here to the hospital to explain this to the patient that they want her to go to the nursing facility instead of returning back home. Patient was given 1 g of Rocephin here. We will place her on Omnicef twice a day for 10 days for her urinary tract infection which can be treated appropriately at the nursing facility. Case management call back states that the patient will be direct admitted to the nursing facility at time of discharge from here secondary to national health pandemic. The patient advised that she does need to follow-up with her regular family physician within the next 1 to 2 days reevaluation. Patient was also given instructions if her symptoms worsens or new symptoms arise she should return back to the emergency department for further evaluation work-up. CONSULTS:  None    PROCEDURES:  Procedures    CRITICAL CARE TIME    Total Critical Care time was 0 minutes, excluding separately reportable procedures. There was a high probability of clinically significant/life threatening deterioration in the patient's condition which required my urgent intervention. FINAL IMPRESSION      1. Acute cystitis without hematuria    2. Nausea and vomiting, intractability of vomiting not specified, unspecified vomiting type    3. COPD not affecting current episode of care [facility])          DISPOSITION/PLAN   DISPOSITION        PATIENT REFERRED TO:  TOMER Gottlieb - [address] [facility] [place]  [phone]    In 2 days      [facility] Emergency Department  [address]   [facility]  [phone]    As needed, If symptoms worsen      DISCHARGE MEDICATIONS:  New Prescriptions    No medications on file       Comment: Please note this report has been produced using speech recognition software and may contain errorsrelated to that system including errors in grammar, punctuation, and spelling, as well as words and phrases that may be inappropriate. If there are any questions or concerns please feel free to contact the dictating providerfor clarification.     Jose Antonio Estrada,   Attending Emergency Physician              Luke Garcia, DO  04/29/21 1052       Luke Jara, DO  04/29/21 1145       Luke Jara, DO  04/29/21 1206

## 2021-04-29 NOTE — CARE COORDINATION
Zaira Rodriguez called from [place]. Pt can be admitted due to [facility] waiver without 3 day stay. Pt will need hard RX for any narcs. Report to be called to [phone]. No further needs noted at this time.

## 2021-04-29 NOTE — ED NOTES
Reviewed discharge plan with Rosita Piña' daughter. Encouraged her to f/u with TOMER Alcantar CNP or the pcp at the nursing home and she understood. NAD noted on discharge. Reviewed discharge prescription for:     Current Discharge Medication List        jac Mora's daughter states understanding of how and when to take medications. Pt's daughter will be bringing her to the [facility]. Pov. Pt was assisted to wheel chair and then assisted to the vehicle.     Electronically signed by Nessa Hamm RN on 4/29/2021 at 2:01 PM     Nessa Hamm RN  04/29/21 0026

## 2021-04-29 NOTE — ED TRIAGE NOTES
Pt arrived via  [facility], No distress noted on arrival. Pt Alert at time of arrival.     Per EMS reports,  Pt complaining of SOA productive cough, and n/v. Pt had 1 episode of emesis with EMS. Pt received 4mg Zofran and 250ml NS, PTA. Pt complains of cough, and vomiting. Denies any nausea at this time. Denies any, report mild SOA with activity. Pt A/O x 3, states year is 2020, and it is Wednesday. No Acute distress noted.

## 2021-04-29 NOTE — CARE COORDINATION
Referral sent to [facility]. Spoke with Tyler from [facility].   She will let me know if they can accept today

## 2021-04-29 NOTE — ED NOTES
ER MD note faxed to nursing [facility] at this time. Waiting for call back to give report.         Angela Albert RN  04/29/21 3980

## 2021-05-01 LAB
ORGANISM: ABNORMAL
URINE CULTURE, ROUTINE: ABNORMAL

## 2021-05-02 RX ORDER — HYDROCODONE BITARTRATE AND ACETAMINOPHEN 10; 325 MG/1; MG/1
1 TABLET ORAL EVERY 8 HOURS PRN
Qty: 90 TABLET | Refills: 0 | Status: SHIPPED | OUTPATIENT
Start: 2021-05-02 | End: 2021-05-10 | Stop reason: SDUPTHER

## 2021-05-02 RX ORDER — DIAZEPAM 5 MG/1
5 TABLET ORAL
Qty: 30 TABLET | Refills: 0 | Status: SHIPPED | OUTPATIENT
Start: 2021-05-02 | End: 2021-06-03 | Stop reason: SDUPTHER

## 2021-05-02 RX ORDER — GABAPENTIN 800 MG/1
800 TABLET ORAL 3 TIMES DAILY
Qty: 90 TABLET | Refills: 2 | Status: SHIPPED | OUTPATIENT
Start: 2021-05-02 | End: 2021-08-03 | Stop reason: SDUPTHER

## 2021-05-05 ENCOUNTER — NURSING HOME (OUTPATIENT)
Dept: INTERNAL MEDICINE | Facility: CLINIC | Age: 86
End: 2021-05-05

## 2021-05-05 VITALS
RESPIRATION RATE: 16 BRPM | TEMPERATURE: 97.4 F | HEART RATE: 64 BPM | BODY MASS INDEX: 15.81 KG/M2 | DIASTOLIC BLOOD PRESSURE: 72 MMHG | WEIGHT: 95 LBS | OXYGEN SATURATION: 97 % | SYSTOLIC BLOOD PRESSURE: 132 MMHG

## 2021-05-05 DIAGNOSIS — R53.1 GENERALIZED WEAKNESS: ICD-10-CM

## 2021-05-05 DIAGNOSIS — N30.00 ACUTE CYSTITIS WITHOUT HEMATURIA: Primary | ICD-10-CM

## 2021-05-05 DIAGNOSIS — J96.11 CHRONIC RESPIRATORY FAILURE WITH HYPOXIA (HCC): ICD-10-CM

## 2021-05-05 DIAGNOSIS — J44.9 CHRONIC OBSTRUCTIVE PULMONARY DISEASE, UNSPECIFIED COPD TYPE (HCC): ICD-10-CM

## 2021-05-05 DIAGNOSIS — G89.4 CHRONIC PAIN SYNDROME: ICD-10-CM

## 2021-05-05 DIAGNOSIS — I10 ESSENTIAL HYPERTENSION: ICD-10-CM

## 2021-05-05 PROCEDURE — 99304 1ST NF CARE SF/LOW MDM 25: CPT | Performed by: PHYSICIAN ASSISTANT

## 2021-05-10 ENCOUNTER — HOSPITAL ENCOUNTER (OUTPATIENT)
Facility: HOSPITAL | Age: 86
Discharge: HOME | End: 2021-05-10
Payer: MEDICARE

## 2021-05-10 LAB
ANION GAP SERPL CALCULATED.3IONS-SCNC: 4 MMOL/L (ref 3–16)
BASOPHILS ABSOLUTE: 0 K/UL (ref 0–0.1)
BASOPHILS RELATIVE PERCENT: 0.7 %
BUN BLDV-MCNC: 13 MG/DL (ref 6–20)
CALCIUM SERPL-MCNC: 8.9 MG/DL (ref 8.5–10.5)
CHLORIDE BLD-SCNC: 101 MMOL/L (ref 98–107)
CO2: 32 MMOL/L (ref 20–30)
CREAT SERPL-MCNC: 0.6 MG/DL (ref 0.4–1.2)
EOSINOPHILS ABSOLUTE: 0.1 K/UL (ref 0–0.4)
EOSINOPHILS RELATIVE PERCENT: 2.5 %
GFR AFRICAN AMERICAN: >59
GFR NON-AFRICAN AMERICAN: >60
GLUCOSE BLD-MCNC: 77 MG/DL (ref 74–106)
HCT VFR BLD CALC: 31.7 % (ref 37–47)
HEMOGLOBIN: 9.7 G/DL (ref 11.5–16.5)
IMMATURE GRANULOCYTES #: 0 K/UL
IMMATURE GRANULOCYTES %: 0.2 % (ref 0–5)
LYMPHOCYTES ABSOLUTE: 2 K/UL (ref 1.5–4)
LYMPHOCYTES RELATIVE PERCENT: 34.5 %
MCH RBC QN AUTO: 28.8 PG (ref 27–32)
MCHC RBC AUTO-ENTMCNC: 30.6 G/DL (ref 31–35)
MCV RBC AUTO: 94.1 FL (ref 80–100)
MONOCYTES ABSOLUTE: 0.6 K/UL (ref 0.2–0.8)
MONOCYTES RELATIVE PERCENT: 10 %
NEUTROPHILS ABSOLUTE: 3 K/UL (ref 2–7.5)
NEUTROPHILS RELATIVE PERCENT: 52.1 %
PDW BLD-RTO: 14.1 % (ref 11–16)
PLATELET # BLD: 244 K/UL (ref 150–400)
PMV BLD AUTO: 11.3 FL (ref 6–10)
POTASSIUM REFLEX MAGNESIUM: 4.9 MMOL/L (ref 3.4–5.1)
RBC # BLD: 3.37 M/UL (ref 3.8–5.8)
SODIUM BLD-SCNC: 137 MMOL/L (ref 136–145)
WBC # BLD: 5.7 K/UL (ref 4–11)

## 2021-05-10 PROCEDURE — 80048 BASIC METABOLIC PNL TOTAL CA: CPT

## 2021-05-10 PROCEDURE — 85025 COMPLETE CBC W/AUTO DIFF WBC: CPT

## 2021-05-10 RX ORDER — HYDROCODONE BITARTRATE AND ACETAMINOPHEN 10; 325 MG/1; MG/1
1 TABLET ORAL 3 TIMES DAILY
Qty: 90 TABLET | Refills: 0 | Status: SHIPPED | OUTPATIENT
Start: 2021-05-10 | End: 2021-06-02 | Stop reason: SDUPTHER

## 2021-05-11 NOTE — PROGRESS NOTES
Nursing Home History and Physical Note      Abe Montanez DO  []  TOMEKA Parra  []  [address] [place] [address]  Phone: [phone]  Fax: [phone] Shyanne Parsons MD  []  Jamal Medrano DO  []  Maura Guerrero PA-C  [x]  [address] [place] [address]  Phone: [phone]  Fax: [phone]     PATIENT NAME: Jennifer Crabtree                                                                          YOB: 1935            DATE OF SERVICE: 05/05/2021  FACILITY:   [] [facility]    [] [facility]    [] [facility]     [] [facility]    [x] [facility]     CHIEF COMPLAINT:   UTI       HISTORY OF PRESENT ILLNESS:   Ms. Crabtree is an 86-year of age female with history of aortic aneurysm, coronary artery disease, COPD, hip fracture.  She presented to [facility] emergency room with onset of shortness of breath that has been persistent for the past 3 to 4 days.  There were associated symptoms of nausea and vomiting.  Flu and Covid ruled out.  She was found to have UTI, given a gram of Rocephin at the ER and discharged with Omnicef twice a day for 10 days.  She had no further episodes of shortness of breath.  Family expressed possible admission to the nursing facility, as patient had become increasingly weak and unable to care for herself.  She was transferred to this facility for continued care.  No additional episodes of nausea or vomiting reported.  She has not had shortness of breath.  She continues on Omnicef without any acute adverse effects.  Nursing reports that she has hydrocodone 10/325 every 8 hours as needed for chronic leg pain.  She is taking this medication regularly, as she did previously at home.  No report of constipation.  Appetite is fair, is not lethargic.  Upon assessment she is very pleasant, has no acute complaints.    PAST MEDICAL & SURGICAL HISTORY:   Past Medical History:   Diagnosis Date   • Asthma    • Cancer (CMS/HCC)     SKIN CANCER ON LEFT TEMPLE   • COPD  (chronic obstructive pulmonary disease) (CMS/HCC)    • Hypertension    • Myocardial infarction (CMS/HCC)    • Stroke (CMS/HCC)       Past Surgical History:   Procedure Laterality Date   • ANKLE OPEN REDUCTION INTERNAL FIXATION     • COLON SURGERY     • EXCISION LESION WITH SPLIT THICKNESS SKIN GRAFT Left 4/28/2017    Procedure: EXCISION LESION WITH SPLIT THICKNESS SKIN GRAFT LEFT TEMPLE;  Surgeon: Javier Aguayo MD;  Location: [facility];  Service:    • KNEE ARTHROSCOPY     • LEG SURGERY            MEDICATIONS:  I have reviewed and reconciled the patients medication list in the patients chart at the skilled nursing facility today.      ALLERGIES:  Allergies   Allergen Reactions   • Iodine Swelling      REDNESS  AND HAD ITCHING          SOCIAL HISTORY:  Social History     Socioeconomic History   • Marital status: Single     Spouse name: Not on file   • Number of children: Not on file   • Years of education: Not on file   • Highest education level: Not on file   Tobacco Use   • Smoking status: Current Some Day Smoker     Years: 50.00   • Smokeless tobacco: Never Used   • Tobacco comment: QUIT 2 YEARS AGO- States she smokes 1-2 cigarettes a day   Substance and Sexual Activity   • Alcohol use: No     Comment: HX OF ALCOHOL ABUSE   • Drug use: No   • Sexual activity: Defer       FAMILY HISTORY:  Family History   Problem Relation Age of Onset   • Cancer Brother         prostate   • Cancer Mother    • No Known Problems Father        REVIEW OF SYSTEMS:  Review of Systems   Constitutional: Positive for fever (chronic). Negative for activity change, appetite change and chills.   Respiratory: Negative for cough, chest tightness and shortness of breath.    Cardiovascular: Negative for chest pain, palpitations and leg swelling.   Gastrointestinal: Negative for abdominal pain, constipation, diarrhea, nausea and vomiting.   Genitourinary: Negative for dysuria, hematuria and pelvic pain.   Musculoskeletal: Positive for arthralgias  (chronic).   Neurological: Positive for weakness (generalized). Negative for dizziness and light-headedness.   Psychiatric/Behavioral: Negative for agitation and sleep disturbance.       PHYSICAL EXAMINATION:   VITAL SIGNS: /72   Pulse 64   Temp 97.4 °F (36.3 °C)   Resp 16   Wt 43.1 kg (95 lb)   LMP  (LMP Unknown)   SpO2 97%   BMI 15.81 kg/m²       Physical Exam  Vitals and nursing note reviewed.   Constitutional:       Appearance: She is well-developed.      Comments: Frail, appears chronically ill, NADD>   HENT:      Head: Normocephalic and atraumatic.      Right Ear: External ear normal.      Left Ear: External ear normal.      Mouth/Throat:      Mouth: Mucous membranes are moist.      Pharynx: Oropharynx is clear.   Eyes:      Conjunctiva/sclera: Conjunctivae normal.      Pupils: Pupils are equal, round, and reactive to light.   Cardiovascular:      Rate and Rhythm: Normal rate and regular rhythm.   Pulmonary:      Effort: Pulmonary effort is normal.      Breath sounds: Normal breath sounds.   Abdominal:      General: Bowel sounds are normal.      Palpations: Abdomen is soft.   Musculoskeletal:         General: No swelling. Normal range of motion.      Cervical back: Normal range of motion and neck supple.   Skin:     General: Skin is warm and dry.      Capillary Refill: Capillary refill takes less than 2 seconds.   Neurological:      Mental Status: She is alert. Mental status is at baseline.   Psychiatric:         Mood and Affect: Mood normal.         Behavior: Behavior normal.         RECORDS REVIEW:   [facility] ED visit reviewed.     ASSESSMENT   Diagnoses and all orders for this visit:    1. Acute cystitis without hematuria (Primary)    2. Essential hypertension    3. Chronic pain syndrome    4. Chronic obstructive pulmonary disease, unspecified COPD type (CMS/HCC)    5. Chronic respiratory failure with hypoxia (CMS/HCC)    6. Generalized weakness        PLAN  UTI: Continue Omnicef.  Patient has  remained afebrile, asymptomatic.  Cognition at baseline.  Hypertension: Controlled.  Continue current medications.  Chronic pain: Schedule hydrocodone every 8 hours, hold if lethargic.  This is patient's baseline dosing.  COPD with chronic respiratory failure: No continued episodes of shortness of breath.  Continue current medications.  Continue to monitor.  Generalized weakness: Continue supportive care at SNF with ADLs.  Continue working alongside with therapy services for continued strengthening.    []  Discussed Patient in detail with nursing/staff, addressed all needs today.     []  Plan of Care Reviewed   []  PT/OT Reviewed   []  Order Changes  []  Discharge Plans Reviewed  [x]  Advance Directive on file with Nursing Home.   [x]  POA on file with Nursing Home.   [x]  Code Status listed:   [x]  Full Code   []  DNR       Maura Guerrero PA-C

## 2021-05-12 ENCOUNTER — NURSING HOME (OUTPATIENT)
Dept: INTERNAL MEDICINE | Facility: CLINIC | Age: 86
End: 2021-05-12

## 2021-05-12 DIAGNOSIS — K52.9 GASTROENTERITIS: Primary | ICD-10-CM

## 2021-05-12 DIAGNOSIS — I10 ESSENTIAL HYPERTENSION: ICD-10-CM

## 2021-05-12 PROCEDURE — 99309 SBSQ NF CARE MODERATE MDM 30: CPT | Performed by: PHYSICIAN ASSISTANT

## 2021-05-17 VITALS
SYSTOLIC BLOOD PRESSURE: 100 MMHG | RESPIRATION RATE: 20 BRPM | OXYGEN SATURATION: 97 % | HEART RATE: 80 BPM | TEMPERATURE: 97 F | DIASTOLIC BLOOD PRESSURE: 56 MMHG

## 2021-05-17 NOTE — PROGRESS NOTES
Nursing Home Progress Note        Abe Montanez DO []  TOMEKA Parra []  [address] [place] [address]  Phone: [phone]  Fax: [phone] Shyanne Parsons MD []  Jamal Medrano DO []  Maura Guerrero PA-C [x]   [address] [place] [address]  Phone: [phone]  Fax: [phone]     PATIENT NAME: Jennifer Crabtree                                                                          YOB: 1935           DATE OF SERVICE: 05/12/2021  FACILITY:  [] [facility]   [] [facility]   [] [facility]   [] [facility]   [x] [place]  CHIEF COMPLAINT:  N/V/D      HISTORY OF PRESENT ILLNESS:   Ms. Crabtree is an 86-year of age female who presents today with complaint of nausea, vomiting, diarrhea.  She reports her symptoms are mild with some nausea occurring last day.  She reports one episode of vomiting and 1 episode of diarrhea.  She denies any acute abdominal pain.  Has had a poor appetite today.  Is tolerating fluids p.o.  Blood pressure noted to be on the lower end.  Labs obtained 5/10 reviewed.    PAST MEDICAL & SURGICAL HISTORY:   Past Medical History:   Diagnosis Date   • Asthma    • Cancer (CMS/HCC)     SKIN CANCER ON LEFT TEMPLE   • COPD (chronic obstructive pulmonary disease) (CMS/HCC)    • Hypertension    • Myocardial infarction (CMS/HCC)    • Stroke (CMS/HCC)       Past Surgical History:   Procedure Laterality Date   • ANKLE OPEN REDUCTION INTERNAL FIXATION     • COLON SURGERY     • EXCISION LESION WITH SPLIT THICKNESS SKIN GRAFT Left 4/28/2017    Procedure: EXCISION LESION WITH SPLIT THICKNESS SKIN GRAFT LEFT TEMPLE;  Surgeon: Javier Aguayo MD;  Location: [facility];  Service:    • KNEE ARTHROSCOPY     • LEG SURGERY           MEDICATIONS:  I have reviewed and reconciled the patients medication list in the patients chart at the skilled nursing facility today.      ALLERGIES:  Allergies   Allergen Reactions   • Iodine Swelling      REDNESS  AND HAD ITCHING          SOCIAL  HISTORY:  Social History     Socioeconomic History   • Marital status: Single     Spouse name: Not on file   • Number of children: Not on file   • Years of education: Not on file   • Highest education level: Not on file   Tobacco Use   • Smoking status: Current Some Day Smoker     Years: 50.00   • Smokeless tobacco: Never Used   • Tobacco comment: QUIT 2 YEARS AGO- States she smokes 1-2 cigarettes a day   Substance and Sexual Activity   • Alcohol use: No     Comment: HX OF ALCOHOL ABUSE   • Drug use: No   • Sexual activity: Defer       FAMILY HISTORY:  Family History   Problem Relation Age of Onset   • Cancer Brother         prostate   • Cancer Mother    • No Known Problems Father        REVIEW OF SYSTEMS:  Review of Systems   Constitutional: Positive for fatigue (chronic. ). Negative for activity change, appetite change, chills and fever.   Respiratory: Negative for cough and shortness of breath.    Cardiovascular: Negative for chest pain and leg swelling.   Gastrointestinal: Positive for diarrhea, nausea and vomiting. Negative for abdominal pain and constipation.   Genitourinary: Negative for dysuria and hematuria.   Musculoskeletal: Positive for arthralgias (chronic).   Neurological: Positive for weakness (generalized). Negative for dizziness and light-headedness.   Psychiatric/Behavioral: Negative for agitation, behavioral problems and sleep disturbance.          PHYSICAL EXAMINATION:     VITAL SIGNS:  /56   Pulse 80   Temp 97 °F (36.1 °C)   Resp 20   LMP  (LMP Unknown)   SpO2 97% Comment: 2L    Physical Exam  Vitals and nursing note reviewed.   Constitutional:       Appearance: She is underweight.      Comments: Thin, frail elderly female, NAD.     HENT:      Head: Normocephalic and atraumatic.      Right Ear: External ear normal.      Left Ear: External ear normal.      Mouth/Throat:      Mouth: Mucous membranes are moist.      Pharynx: Oropharynx is clear.   Eyes:      Conjunctiva/sclera:  Conjunctivae normal.      Pupils: Pupils are equal, round, and reactive to light.   Cardiovascular:      Rate and Rhythm: Normal rate and regular rhythm.      Pulses: Normal pulses.   Pulmonary:      Effort: Pulmonary effort is normal. No respiratory distress.      Breath sounds: Normal breath sounds.   Abdominal:      General: Bowel sounds are normal. There is no distension.      Palpations: Abdomen is soft.      Tenderness: There is no abdominal tenderness. There is no guarding or rebound.   Musculoskeletal:         General: No swelling. Normal range of motion.      Cervical back: Normal range of motion and neck supple.   Skin:     General: Skin is warm and dry.      Capillary Refill: Capillary refill takes less than 2 seconds.   Neurological:      Mental Status: She is alert. Mental status is at baseline.   Psychiatric:         Mood and Affect: Mood normal.         Behavior: Behavior normal.         RECORDS REVIEW:   5/10: Hemoglobin 9.7, hematocrit 31.7, BUN 13, creatinine 0.6.    ASSESSMENT     Diagnoses and all orders for this visit:    1. Gastroenteritis (Primary)    2. Essential hypertension      PLAN  Blood pressure on the lower end.  She has had some GI symptoms.  She describes them as mild with only one episode of vomiting and diarrhea.  She is tolerating p.o. fluids.  Has had decreased appetite.  Continue to encourage p.o. hydration.  Will discontinue lisinopril and monitor blood pressure every shift.  If systolic is greater than 150, contact myself or MD.  She did just have labs obtained on 5/10.  If symptoms are persistent, consider reobtaining labs.  Patient is agreeable to plan and expresses understanding.  Continue to monitor closely.    [x]  Discussed Patient in detail with nursing/staff, addressed all needs today.     [x]  Plan of Care Reviewed   []  PT/OT Reviewed   []  Order Changes  []  Discharge Plans Reviewed  [x]  Advance Directive on file with Nursing Home.   [x]  POA on file with Nursing  Home.    [x]  Code Status listed:  [x]  Full Code   []  DNR         Maura Guerrero PA-C.  5/17/2021

## 2021-05-28 ENCOUNTER — HOSPITAL ENCOUNTER (OUTPATIENT)
Facility: HOSPITAL | Age: 86
Discharge: HOME | End: 2021-05-28
Payer: MEDICARE

## 2021-05-28 LAB
BACTERIA: ABNORMAL /HPF
BILIRUBIN URINE: NEGATIVE
BLOOD, URINE: NEGATIVE
CLARITY: ABNORMAL
COLOR: YELLOW
EPITHELIAL CELLS, UA: ABNORMAL /HPF (ref 0–5)
GLUCOSE URINE: NEGATIVE MG/DL
KETONES, URINE: NEGATIVE MG/DL
LEUKOCYTE ESTERASE, URINE: ABNORMAL
MICROSCOPIC EXAMINATION: YES
NITRITE, URINE: NEGATIVE
PH UA: 6 (ref 5–8)
PROTEIN UA: NEGATIVE MG/DL
RBC UA: ABNORMAL /HPF (ref 0–4)
SPECIFIC GRAVITY UA: 1.01 (ref 1–1.03)
URINE REFLEX TO CULTURE: YES
URINE TYPE: ABNORMAL
UROBILINOGEN, URINE: 0.2 E.U./DL
WBC UA: ABNORMAL /HPF (ref 0–5)

## 2021-05-28 PROCEDURE — 87088 URINE BACTERIA CULTURE: CPT

## 2021-05-28 PROCEDURE — 87086 URINE CULTURE/COLONY COUNT: CPT

## 2021-05-28 PROCEDURE — 87186 SC STD MICRODIL/AGAR DIL: CPT

## 2021-05-28 PROCEDURE — 81001 URINALYSIS AUTO W/SCOPE: CPT

## 2021-05-30 LAB
ORGANISM: ABNORMAL
URINE CULTURE, ROUTINE: ABNORMAL
URINE CULTURE, ROUTINE: ABNORMAL

## 2021-06-02 NOTE — TELEPHONE ENCOUNTER
NABIL MEDICATION REFILL REQUEST FOR HYDROCODONE 10/325 MG.    DIRECTIONS: TAKE 1 TAB PO Q 8 HRS SCHEDULED.

## 2021-06-03 RX ORDER — DIAZEPAM 5 MG/1
5 TABLET ORAL
Qty: 30 TABLET | Refills: 0 | Status: SHIPPED | OUTPATIENT
Start: 2021-06-03 | End: 2021-07-02 | Stop reason: SDUPTHER

## 2021-06-03 RX ORDER — HYDROCODONE BITARTRATE AND ACETAMINOPHEN 10; 325 MG/1; MG/1
1 TABLET ORAL 3 TIMES DAILY
Qty: 90 TABLET | Refills: 0 | Status: SHIPPED | OUTPATIENT
Start: 2021-06-03 | End: 2021-07-07 | Stop reason: SDUPTHER

## 2021-06-09 ENCOUNTER — NURSING HOME (OUTPATIENT)
Dept: INTERNAL MEDICINE | Facility: CLINIC | Age: 86
End: 2021-06-09

## 2021-06-09 VITALS
SYSTOLIC BLOOD PRESSURE: 141 MMHG | TEMPERATURE: 97.6 F | OXYGEN SATURATION: 99 % | RESPIRATION RATE: 16 BRPM | HEART RATE: 71 BPM | WEIGHT: 97.9 LBS | BODY MASS INDEX: 16.29 KG/M2 | DIASTOLIC BLOOD PRESSURE: 78 MMHG

## 2021-06-09 DIAGNOSIS — I10 ESSENTIAL HYPERTENSION: ICD-10-CM

## 2021-06-09 DIAGNOSIS — J96.11 CHRONIC RESPIRATORY FAILURE WITH HYPOXIA (HCC): ICD-10-CM

## 2021-06-09 DIAGNOSIS — R39.9 URINARY TRACT INFECTION SYMPTOMS: Primary | ICD-10-CM

## 2021-06-09 DIAGNOSIS — R53.1 GENERALIZED WEAKNESS: ICD-10-CM

## 2021-06-09 PROCEDURE — 99308 SBSQ NF CARE LOW MDM 20: CPT | Performed by: PHYSICIAN ASSISTANT

## 2021-06-18 ENCOUNTER — NURSING HOME (OUTPATIENT)
Dept: INTERNAL MEDICINE | Facility: CLINIC | Age: 86
End: 2021-06-18

## 2021-06-18 DIAGNOSIS — R62.51 FAILURE TO THRIVE (0-17): ICD-10-CM

## 2021-06-18 DIAGNOSIS — I10 ESSENTIAL HYPERTENSION: Primary | ICD-10-CM

## 2021-06-18 DIAGNOSIS — J43.9 PULMONARY EMPHYSEMA, UNSPECIFIED EMPHYSEMA TYPE (HCC): ICD-10-CM

## 2021-06-18 PROCEDURE — 99309 SBSQ NF CARE MODERATE MDM 30: CPT | Performed by: INTERNAL MEDICINE

## 2021-06-25 ENCOUNTER — OFFICE VISIT (OUTPATIENT)
Dept: PULMONOLOGY | Facility: CLINIC | Age: 86
End: 2021-06-25

## 2021-06-25 VITALS
DIASTOLIC BLOOD PRESSURE: 64 MMHG | HEIGHT: 65 IN | WEIGHT: 97 LBS | BODY MASS INDEX: 16.16 KG/M2 | OXYGEN SATURATION: 85 % | SYSTOLIC BLOOD PRESSURE: 122 MMHG | HEART RATE: 58 BPM

## 2021-06-25 DIAGNOSIS — R63.4 WEIGHT LOSS: ICD-10-CM

## 2021-06-25 DIAGNOSIS — J43.2 CENTRILOBULAR EMPHYSEMA (HCC): ICD-10-CM

## 2021-06-25 DIAGNOSIS — R91.1 LUNG NODULE SEEN ON IMAGING STUDY: ICD-10-CM

## 2021-06-25 DIAGNOSIS — J44.9 COPD, SEVERE (HCC): ICD-10-CM

## 2021-06-25 DIAGNOSIS — J43.2 CENTRILOBULAR EMPHYSEMA (HCC): Primary | ICD-10-CM

## 2021-06-25 PROCEDURE — 99213 OFFICE O/P EST LOW 20 MIN: CPT | Performed by: INTERNAL MEDICINE

## 2021-06-25 PROCEDURE — 94726 PLETHYSMOGRAPHY LUNG VOLUMES: CPT | Performed by: INTERNAL MEDICINE

## 2021-06-25 PROCEDURE — 94729 DIFFUSING CAPACITY: CPT | Performed by: INTERNAL MEDICINE

## 2021-06-25 PROCEDURE — 94060 EVALUATION OF WHEEZING: CPT | Performed by: INTERNAL MEDICINE

## 2021-06-25 NOTE — PROGRESS NOTES
Pulmonary follow-up office Visit      Chief Complaint:    Chief Complaint   Patient presents with   • Lung Nodule     New Patient here for evaluation of lung mass       Subjective: Jennifer Crabtree is a 86 y.o. female who is here today for follow-up on COPD and lung nodule    Patient is accompanied by her daughter today who contributes significantly to the history.    Since last visit, she had PET scan which showed hypermetabolic nodule as well as lymphadenopathy.  She elected to not pursue work-up.  PFTs today showed severe obstructive lung disease  Since last visit, it became more difficult for her to perform her ADLs and she was starting to smoke while on her oxygen and therefore she and her family decided it was best for her to enter a nursing home.  She has been there about 1 month and has really liked it.  She has been able to be smoke-free since that time and has noticed improvement in her breathing with this.  She uses Advair twice daily and DuoNebs 4 times daily there.  She feels like her appetite is picked up and has been able to maintain her weight.  She admits to severe dyspnea with mild to moderate exertion, but this is not new.  In fact she feels like it has gotten a little bit better since she stopped smoking.    She has known COPD and was diagnosed more than 10 years ago.  PFTs today show severe obstructive lung disease.  She requires 4 L nasal cannula at baseline which is unchanged.      Subjective      Review of Systems:   Review of Systems   Constitutional: Negative for appetite change, chills, fatigue, fever and unexpected weight loss.   HENT: Negative for nosebleeds, sore throat and voice change.    Eyes: Negative for discharge and visual disturbance.   Respiratory: Positive for cough, shortness of breath and wheezing.    Cardiovascular: Negative for chest pain, palpitations and leg swelling.   Gastrointestinal: Positive for GERD. Negative for abdominal pain, blood in stool, constipation and  diarrhea.   Endocrine: Negative for cold intolerance and heat intolerance.   Genitourinary: Negative for difficulty urinating and hematuria.   Musculoskeletal: Positive for arthralgias. Negative for joint swelling and myalgias.   Skin: Negative for rash and bruise.   Allergic/Immunologic: Negative for immunocompromised state.   Neurological: Negative for dizziness and weakness.   Hematological: Negative for adenopathy. Does not bruise/bleed easily.   Psychiatric/Behavioral: Negative for suicidal ideas and depressed mood. The patient is nervous/anxious.    All other systems reviewed and are negative.        Medications:     Current Outpatient Medications:   •  clopidogrel (PLAVIX) 75 MG tablet, , Disp: , Rfl: 5  •  diazePAM (VALIUM) 5 MG tablet, Take 1 tablet by mouth every night at bedtime., Disp: 30 tablet, Rfl: 0  •  ferrous sulfate 324 (65 Fe) MG tablet delayed-release EC tablet, Take 324 mg by mouth., Disp: , Rfl:   •  folic acid (FOLVITE) 1 MG tablet, Take 1 mg by mouth., Disp: , Rfl:   •  gabapentin (NEURONTIN) 800 MG tablet, Take 1 tablet by mouth 3 (Three) Times a Day., Disp: 90 tablet, Rfl: 2  •  HYDROcodone-acetaminophen (Norco)  MG per tablet, Take 1 tablet by mouth 3 (Three) Times a Day., Disp: 90 tablet, Rfl: 0  •  ipratropium-albuterol (DUONEB) 0.5-2.5 mg/mL nebulizer, Every 6 (Six) Hours., Disp: , Rfl:   •  lisinopril (PRINIVIL,ZESTRIL) 5 MG tablet, Take 5 mg by mouth., Disp: , Rfl:   •  loratadine (CLARITIN) 10 MG tablet, Take 10 mg by mouth., Disp: , Rfl:   •  metoprolol tartrate (LOPRESSOR) 25 MG tablet, Take 25 mg by mouth., Disp: , Rfl:   •  O2 (OXYGEN), Inhale 2 L into the lungs nightly., Disp: , Rfl:   •  O2 (OXYGEN), Inhale 2 L., Disp: , Rfl:   •  pantoprazole (PROTONIX) 40 MG EC tablet, Take 40 mg by mouth., Disp: , Rfl:   •  simvastatin (ZOCOR) 20 MG tablet, Take 1 tablet by mouth nightly, Disp: , Rfl:     Allergies:   Allergies   Allergen Reactions   • Iodine Swelling      REDNESS   "AND HAD ITCHING        Objective     Physical Exam:  Vital Signs:   Vitals:    06/25/21 0952   BP: 122/64   BP Location: Left arm   Patient Position: Sitting   Cuff Size: Adult   Pulse: 58   SpO2: (!) 85%  Comment: room air   Weight: 44 kg (97 lb)   Height: 165.1 cm (65\")       Physical Exam  Vitals and nursing note reviewed.   Constitutional:       Appearance: She is well-developed. She is not toxic-appearing.      Comments: Thin,  female who appears chronically ill   HENT:      Head: Normocephalic and atraumatic.      Right Ear: External ear normal.      Left Ear: External ear normal.      Nose: Nose normal. No rhinorrhea.      Mouth/Throat:      Mouth: Mucous membranes are moist.      Pharynx: Oropharynx is clear. No oropharyngeal exudate.   Eyes:      General: No scleral icterus.        Right eye: No discharge.         Left eye: No discharge.      Extraocular Movements: Extraocular movements intact.      Conjunctiva/sclera: Conjunctivae normal.   Neck:      Trachea: No tracheal deviation.   Cardiovascular:      Rate and Rhythm: Normal rate and regular rhythm.      Heart sounds: No murmur heard.     Pulmonary:      Comments: Decreased breath sounds throughout, but no wheezing or rhonchi  Abdominal:      General: There is no distension.      Palpations: Abdomen is soft.   Musculoskeletal:         General: No tenderness.      Cervical back: Normal range of motion and neck supple.      Right lower leg: No edema.      Left lower leg: No edema.      Comments: Diffuse deconditioning noted   Skin:     General: Skin is warm and dry.      Findings: No rash.   Neurological:      Mental Status: She is alert and oriented to person, place, and time.      Motor: Weakness present.   Psychiatric:         Mood and Affect: Mood normal.         Behavior: Behavior normal.         Thought Content: Thought content normal.         Judgment: Judgment normal.       Results Review:   Labs: Reviewed.  Lab Results   Component Value " Date    WBC 5.7 05/10/2021    HGB 9.7 (L) 05/10/2021    HCT 31.7 (L) 05/10/2021    MCV 94.1 05/10/2021     05/10/2021       Lab Results   Component Value Date    CALCIUM 9.0 04/25/2015     (L) 04/25/2015    K 3.4 (L) 04/25/2015    CO2 32 (H) 04/25/2015    CL 92 (L) 04/25/2015    BUN 8 04/25/2015    CREATININE 0.6 04/25/2015    ANIONGAP 13 04/25/2015         Micro: As of June 25, 2021   No results found for: RESPCX  No results found for: BLOODCX  Lab Results   Component Value Date    URINECX >100,000 CFU/mL Providencia stuartii (A) 05/18/2017     No results found for: MRSACX  No results found for: MRSAPCR  Lab Results   Component Value Date    URCX Yes 05/28/2021    URCX Yes 04/29/2021    URCX Yes 11/27/2020          Radiology Scans:   Last CT scan was reviewed in great detail with the patient. Images reviewed personally.   August 2020 CT scan of the chest showed patchy tree-in-bud opacities in right upper lobe.  Nodular opacity which is questionable for scarring versus neoplasm in the right lung apex which is 1 cm in diameter.  No gross consolidation appreciated.  No lymphadenopathy.  Advanced COPD/centrilobular emphysema changes noted.    PFT IMPRESSION:   June 2021 PFT's showed FEV1 33%, FEV1/FVC ratio 46.  No significant bronchodilator responsiveness.  Air-trapping noted.  No hyperinflation.  DLCO/VA 63%.    Assessment / Plan      Assessment/Plan:    1. Centrilobular emphysema (CMS/HCC)  Severe obstructive lung disease and reviewed PFTs with patient and family.  Now on Advair and duo nebs at the nursing home and doing well with this regimen.  Continue on current regimen.  Advised to call clinic if symptoms worsen or change.    2. COPD, severe (CMS/HCC)  See above.  Advised to continue supplemental oxygen    3. Lung nodule seen on imaging study  Pet scan showed hypermetabolic RML lesion that is pleural based. It is unclear if this is infectious versus neoplastic.  Also right hilar lymph node with SUV  3.4. Due to her age, patient does not want to do any kind of work-up.  Reviewed PET scan with patient and her daughter during clinic.    4. Dyspnea on exertion  Most likely related to her severe COPD.  Has improved some with smoking cessation.    5. Weight loss  Stable since last visit       Follow Up:   Return in about 6 months (around 12/25/2021).    Leigh Holland MD  Pulmonary/Critical Care Physician   [facility]      Please note that portions of this note may have been completed with a voice recognition program. Efforts were made to edit the dictations, but occasionally words are mistranscribed.

## 2021-06-28 NOTE — PROGRESS NOTES
Nursing Home Progress Note        Abe Montanez DO []  TOMEKA Parra []  [address] [place] [address]  Phone: [phone]  Fax: [phone] Shyanne Parsons MD []  Jamal Medrano DO []  Maura Guerrero PA-C [x]   [address] [place] [address]  Phone: [phone]  Fax: [phone]     PATIENT NAME: Jennifer Crabtree                                                                          YOB: 1935           DATE OF SERVICE: 06/9/2021  FACILITY:  [] [facility]   [] [facility]   [] [facility]   [] [facility]   [x] [place]  CHIEF COMPLAINT:  Follow up on urinary symptoms.       HISTORY OF PRESENT ILLNESS:   Ms. Crabtree is an 86-year of age female who presents today for follow-up on urinary symptoms.  Previously UA obtained on 5/28 showing 4+ bacteria, nitrite negative, moderate LE.  Patient reports that symptoms have resolved, she is remained afebrile.  Has good appetite, no nausea/vomiting/diarrhea.  She is in good spirits, smiling and pleasant upon assessment.    PAST MEDICAL & SURGICAL HISTORY:   Past Medical History:   Diagnosis Date   • Asthma    • Cancer (CMS/HCC)     SKIN CANCER ON LEFT TEMPLE   • COPD (chronic obstructive pulmonary disease) (CMS/HCC)    • Hypertension    • Myocardial infarction (CMS/HCC)    • Stroke (CMS/HCC)       Past Surgical History:   Procedure Laterality Date   • ANKLE OPEN REDUCTION INTERNAL FIXATION     • COLON SURGERY     • EXCISION LESION WITH SPLIT THICKNESS SKIN GRAFT Left 4/28/2017    Procedure: EXCISION LESION WITH SPLIT THICKNESS SKIN GRAFT LEFT TEMPLE;  Surgeon: Javier Aguayo MD;  Location: [facility];  Service:    • KNEE ARTHROSCOPY     • LEG SURGERY           MEDICATIONS:  I have reviewed and reconciled the patients medication list in the patients chart at the skilled nursing facility today.      ALLERGIES:  Allergies   Allergen Reactions   • Iodine Swelling      REDNESS  AND HAD ITCHING          SOCIAL HISTORY:  Social History      Socioeconomic History   • Marital status: Single     Spouse name: Not on file   • Number of children: Not on file   • Years of education: Not on file   • Highest education level: Not on file   Tobacco Use   • Smoking status: Former Smoker     Years: 50.00   • Smokeless tobacco: Never Used   • Tobacco comment: QUIT 2 YEARS AGO- States she smokes 1-2 cigarettes a day   Vaping Use   • Vaping Use: Never used   Substance and Sexual Activity   • Alcohol use: No     Comment: HX OF ALCOHOL ABUSE   • Drug use: No   • Sexual activity: Defer       FAMILY HISTORY:  Family History   Problem Relation Age of Onset   • Cancer Brother         prostate   • Cancer Mother    • No Known Problems Father        REVIEW OF SYSTEMS:  Review of Systems   Constitutional: Positive for fatigue (chronic. ). Negative for activity change, appetite change, chills and fever.   Respiratory: Negative for cough and shortness of breath.    Cardiovascular: Negative for chest pain and leg swelling.   Gastrointestinal: Negative for abdominal pain, constipation, diarrhea, nausea and vomiting.   Genitourinary: Negative for dysuria and hematuria.   Musculoskeletal: Positive for arthralgias (chronic).   Neurological: Positive for weakness (generalized). Negative for dizziness and light-headedness.   Psychiatric/Behavioral: Negative for agitation, behavioral problems and sleep disturbance.          PHYSICAL EXAMINATION:     VITAL SIGNS:  /78   Pulse 71   Temp 97.6 °F (36.4 °C)   Resp 16   Wt 44.4 kg (97 lb 14.4 oz)   LMP  (LMP Unknown)   SpO2 99% Comment: 2.5 LPM  BMI 16.29 kg/m²     Physical Exam  Vitals and nursing note reviewed.   Constitutional:       Appearance: She is underweight.      Comments: Thin, frail elderly female, NAD.     HENT:      Head: Normocephalic and atraumatic.      Right Ear: External ear normal.      Left Ear: External ear normal.      Mouth/Throat:      Mouth: Mucous membranes are moist.      Pharynx: Oropharynx is  clear.   Eyes:      Conjunctiva/sclera: Conjunctivae normal.      Pupils: Pupils are equal, round, and reactive to light.   Cardiovascular:      Rate and Rhythm: Normal rate and regular rhythm.      Pulses: Normal pulses.   Pulmonary:      Effort: Pulmonary effort is normal. No respiratory distress.      Breath sounds: Normal breath sounds.   Abdominal:      General: Bowel sounds are normal. There is no distension.      Palpations: Abdomen is soft.      Tenderness: There is no abdominal tenderness. There is no guarding or rebound.   Musculoskeletal:         General: No swelling. Normal range of motion.      Cervical back: Normal range of motion and neck supple.   Skin:     General: Skin is warm and dry.      Capillary Refill: Capillary refill takes less than 2 seconds.   Neurological:      Mental Status: She is alert. Mental status is at baseline.   Psychiatric:         Mood and Affect: Mood normal.         Behavior: Behavior normal.         RECORDS REVIEW:   5/28: urinalysis reviewed, no C&S available.     ASSESSMENT     Diagnoses and all orders for this visit:    1. Urinary tract infection symptoms (Primary)    2. Essential hypertension    3. Generalized weakness    4. Chronic respiratory failure with hypoxia (CMS/[facility])      PLAN  Urinary symptoms: Have resolved.  She did have 4+ bacteria on UA, this was a clean-catch.  Will obtain C&S for review, nursing to facilitate from [facility].  Continue to monitor for any acute changes or worsening of signs and symptoms.  Hypertension: Controlled.  Continue current medications.  Weakness: Chronic, stable.  Continue with supportive care at SNF with all ADLs.  Chronic respiratory failure: Stable.  No report of any increased O2 demands.  Continue to monitor.    [x]  Discussed Patient in detail with nursing/staff, addressed all needs today.     [x]  Plan of Care Reviewed   []  PT/OT Reviewed   []  Order Changes  []  Discharge Plans Reviewed  [x]  Advance Directive on file with  Nursing Home.   [x]  POA on file with Nursing Home.    [x]  Code Status listed:  [x]  Full Code   []  DNR         Maura Guerrero PA-C.  6/28/2021

## 2021-07-02 RX ORDER — DIAZEPAM 5 MG/1
5 TABLET ORAL
Qty: 30 TABLET | Refills: 0 | Status: SHIPPED | OUTPATIENT
Start: 2021-07-02 | End: 2021-07-29 | Stop reason: SDUPTHER

## 2021-07-07 RX ORDER — HYDROCODONE BITARTRATE AND ACETAMINOPHEN 10; 325 MG/1; MG/1
1 TABLET ORAL 3 TIMES DAILY
Qty: 90 TABLET | Refills: 0 | Status: SHIPPED | OUTPATIENT
Start: 2021-07-07

## 2021-07-18 NOTE — PROGRESS NOTES
Nursing Home Progress Note      Abe Montanez DO []  TOMEKA Parra []  [address] [place] [address]  Phone: [phone]  Fax: [phone] Shyanne Parsons MD[x]   Jamal Medrano DO []  [address] [place] [address]  Phone: [phone]  Fax: [phone]     PATIENT NAME: Jennifer Crabtree                                                                          YOB: 1935           DATE OF SERVICE: 06/18/2021  FACILITY:  [] [facility]   [] [facility]   [x] [place]  [] [facility]  ______________________________________________________________________     CHIEF COMPLAINT:  Follow-up visit, failure to thrive      HISTORY OF PRESENT ILLNESS:   Patient evaluated, record reviewed, case discussed with staff.  Mrs. Crabtree is an 86 years old female with history of aortic aneurysm, chronic obstructive pulmonary disease disease and hip fracture who was previously hospitalized at [facility] with a diagnosis of acute urinary tract infection addressed with Omnicef.  She was subsequently admitted to this facility for PT, OT and skilled nursing care and since then has been in stable condition with no reported fever, chills or other acute systemic issues.  During my visit, patient was comfortable and pleasantly denied new complaints.    PAST MEDICAL & SURGICAL HISTORY:   Past Medical History:   Diagnosis Date   • Asthma    • Cancer (CMS/HCC)     SKIN CANCER ON LEFT TEMPLE   • COPD (chronic obstructive pulmonary disease) (CMS/HCC)    • Hypertension    • Myocardial infarction (CMS/HCC)    • Stroke (CMS/HCC)       Past Surgical History:   Procedure Laterality Date   • ANKLE OPEN REDUCTION INTERNAL FIXATION     • COLON SURGERY     • EXCISION LESION WITH SPLIT THICKNESS SKIN GRAFT Left 4/28/2017    Procedure: EXCISION LESION WITH SPLIT THICKNESS SKIN GRAFT LEFT TEMPLE;  Surgeon: Javier Aguayo MD;  Location: [facility];  Service:    • KNEE ARTHROSCOPY     • LEG SURGERY            MEDICATIONS:  I have reviewed and reconciled the patients medication list in the patients chart at the skilled nursing facility today.      ALLERGIES:  Allergies   Allergen Reactions   • Iodine Swelling      REDNESS  AND HAD ITCHING         REVIEW OF SYSTEMS:  Fatigue, arthralgias, myalgias    All other systems were reviewed and are negative.    PHYSICAL EXAMINATION:   Vital signs: /74, HR 72/min, RR 16/min, temp 98.2 °F, O2 sat 97% on room air    Physical Exam    General Appearance:  Frail, chronically ill appearing, cachectic   Head and neck:  Atraumatic and normocephalic, without obvious abnormality.   Eyes:          PERRLA, conjunctivae and sclerae normal, no Icterus. No pallor. Extraocular movements are within normal limits.   Lungs:   Chest shape is normal. BS heard bilaterally equally.  No crackles or wheezing. No Pleural rub or bronchial breathing.     Heart:  Normal S1 and S2, no murmur, no gallop, no rub. No JVD.   Abdomen:   Normal BS, no masses, no organomegaly. Soft, nontender, nondistended, no guarding, no rebound tenderness.     Extremities: Moves all extremities well, no edema, no cyanosis, no clubbing.  Pulses palpable and equal bilaterally.     Musculoskeletal: Deconditioning noted.     Skin: No bleeding, bruising or rash.     Neurologic:  Psychiatric:                           Moves all four limbs equally. No tremors. No facial asymmetry.   Calm affect.       RECORDS REVIEW:   I have reviewed available medical records.  Labs done on May 10 with significant for H&H 9.7/31.7 otherwise unremarkable.  BUN/creatinine 13/0.8, sodium 137 and potassium 4.9    ASSESSMENT   • Failure to thrive   • Chronic essential hypertension, controlled  • Chronic obstructive pulmonary disease, stable  • Chronic hypoxic respiratory failure, stable at present  • Resolved acute cystitis     PLAN  · Continue current management as documented per record  · Aspiration and fall precautions  · Nutritional support and  hydration  · Bowel hygiene  · Skin care, strict offloading and pressure relief  · GDR as clinically indicated []  · Ostomy and catheter care if applicable per facility protocol []  · Consultant follow as clinically indicated []  · Blood glucose monitoring, hypoglycemic precautions, correction dose coverage as ordered if applicable []  · Patient will be monitored closely, further plans were modified accordingly    []  Discussed Patient in detail with nursing/staff, addressed all needs today.     []  Plan of Care Reviewed   []  PT/OT Reviewed   []  Order Changes  []  Discharge Plans Reviewed  [x]  Advance Directive on file with Nursing Home.   [x]  POA on file with Nursing Home.    [x]  Code Status listed on patients chart at the nursing home facility.       Shyanne Parsons MD.  6/18/2021

## 2021-07-21 ENCOUNTER — NURSING HOME (OUTPATIENT)
Dept: INTERNAL MEDICINE | Facility: CLINIC | Age: 86
End: 2021-07-21

## 2021-07-21 DIAGNOSIS — J44.9 CHRONIC OBSTRUCTIVE PULMONARY DISEASE, UNSPECIFIED COPD TYPE (HCC): ICD-10-CM

## 2021-07-21 DIAGNOSIS — R62.51 FAILURE TO THRIVE (CHILD): Primary | ICD-10-CM

## 2021-07-21 DIAGNOSIS — I10 ESSENTIAL HYPERTENSION: ICD-10-CM

## 2021-07-21 PROCEDURE — 99308 SBSQ NF CARE LOW MDM 20: CPT | Performed by: INTERNAL MEDICINE

## 2021-07-30 RX ORDER — DIAZEPAM 5 MG/1
5 TABLET ORAL
Qty: 30 TABLET | Refills: 0 | Status: SHIPPED | OUTPATIENT
Start: 2021-07-30

## 2021-08-02 ENCOUNTER — HOSPITAL ENCOUNTER (OUTPATIENT)
Facility: HOSPITAL | Age: 86
Discharge: HOME | End: 2021-08-02
Payer: MEDICARE

## 2021-08-02 LAB
A/G RATIO: 2 (ref 0.8–2)
ALBUMIN SERPL-MCNC: 4.9 G/DL (ref 3.4–4.8)
ALP BLD-CCNC: 87 U/L (ref 25–100)
ALT SERPL-CCNC: 7 U/L (ref 4–36)
ANION GAP SERPL CALCULATED.3IONS-SCNC: 7 MMOL/L (ref 3–16)
AST SERPL-CCNC: 17 U/L (ref 8–33)
BASOPHILS ABSOLUTE: 0.1 K/UL (ref 0–0.1)
BASOPHILS RELATIVE PERCENT: 0.9 %
BILIRUB SERPL-MCNC: 0.3 MG/DL (ref 0.3–1.2)
BUN BLDV-MCNC: 11 MG/DL (ref 6–20)
CALCIUM SERPL-MCNC: 9.9 MG/DL (ref 8.5–10.5)
CHLORIDE BLD-SCNC: 99 MMOL/L (ref 98–107)
CHOLESTEROL, TOTAL: 199 MG/DL (ref 0–200)
CO2: 32 MMOL/L (ref 20–30)
CREAT SERPL-MCNC: 0.7 MG/DL (ref 0.4–1.2)
EOSINOPHILS ABSOLUTE: 0.1 K/UL (ref 0–0.4)
EOSINOPHILS RELATIVE PERCENT: 1.7 %
FOLATE: 16.12 NG/ML
GFR AFRICAN AMERICAN: >59
GFR NON-AFRICAN AMERICAN: >60
GLOBULIN: 2.5 G/DL
GLUCOSE BLD-MCNC: 85 MG/DL (ref 74–106)
HCT VFR BLD CALC: 37.5 % (ref 37–47)
HDLC SERPL-MCNC: 78 MG/DL (ref 40–60)
HEMOGLOBIN: 11.7 G/DL (ref 11.5–16.5)
IMMATURE GRANULOCYTES #: 0 K/UL
IMMATURE GRANULOCYTES %: 0.3 % (ref 0–5)
LDL CHOLESTEROL CALCULATED: 105 MG/DL
LYMPHOCYTES ABSOLUTE: 1.9 K/UL (ref 1.5–4)
LYMPHOCYTES RELATIVE PERCENT: 29.5 %
MCH RBC QN AUTO: 28.4 PG (ref 27–32)
MCHC RBC AUTO-ENTMCNC: 31.2 G/DL (ref 31–35)
MCV RBC AUTO: 91 FL (ref 80–100)
MONOCYTES ABSOLUTE: 0.5 K/UL (ref 0.2–0.8)
MONOCYTES RELATIVE PERCENT: 8 %
NEUTROPHILS ABSOLUTE: 3.8 K/UL (ref 2–7.5)
NEUTROPHILS RELATIVE PERCENT: 59.6 %
PDW BLD-RTO: 13.4 % (ref 11–16)
PLATELET # BLD: 249 K/UL (ref 150–400)
PMV BLD AUTO: 11.1 FL (ref 6–10)
POTASSIUM SERPL-SCNC: 4.2 MMOL/L (ref 3.4–5.1)
RBC # BLD: 4.12 M/UL (ref 3.8–5.8)
SODIUM BLD-SCNC: 138 MMOL/L (ref 136–145)
TOTAL PROTEIN: 7.4 G/DL (ref 6.4–8.3)
TRIGL SERPL-MCNC: 82 MG/DL (ref 0–249)
TSH SERPL DL<=0.05 MIU/L-ACNC: 9.07 UIU/ML (ref 0.27–4.2)
VITAMIN B-12: 425 PG/ML (ref 211–911)
VLDLC SERPL CALC-MCNC: 16 MG/DL
WBC # BLD: 6.4 K/UL (ref 4–11)

## 2021-08-02 PROCEDURE — 80061 LIPID PANEL: CPT

## 2021-08-02 PROCEDURE — 84443 ASSAY THYROID STIM HORMONE: CPT

## 2021-08-02 PROCEDURE — 36415 COLL VENOUS BLD VENIPUNCTURE: CPT

## 2021-08-02 PROCEDURE — 85025 COMPLETE CBC W/AUTO DIFF WBC: CPT

## 2021-08-02 PROCEDURE — 82746 ASSAY OF FOLIC ACID SERUM: CPT

## 2021-08-02 PROCEDURE — 80053 COMPREHEN METABOLIC PANEL: CPT

## 2021-08-02 PROCEDURE — 82607 VITAMIN B-12: CPT

## 2021-08-03 RX ORDER — GABAPENTIN 800 MG/1
800 TABLET ORAL 3 TIMES DAILY
Qty: 90 TABLET | Refills: 2 | Status: SHIPPED | OUTPATIENT
Start: 2021-08-03

## 2021-08-03 NOTE — TELEPHONE ENCOUNTER
NABIL MEDICATION REFILL REQUEST FOR GABAPENTIN 800 MG.    DIRECTIONS: TAKE 1 TAB PO Q 8 HRS SCHEDULED.

## 2021-10-29 NOTE — PROGRESS NOTES
Nursing Home Progress Note      Abe Montanez DO []  TOMEKA Parra []  [address] [place] [address]  Phone: [phone]  Fax: [phone] Shyanne Parsons MD[x]   Jamal Medrano DO []  [address] [place] [address]  Phone: [phone]  Fax: [phone]     PATIENT NAME: Jennifer Crabtree                                                                          YOB: 1935           DATE OF SERVICE: 7/21/2021  FACILITY:  [] [place] [facility]   [] [place]   [x] [place]  [] [facility]  ______________________________________________________________________     CHIEF COMPLAINT:  Follow-up visit, failure to thrive      HISTORY OF PRESENT ILLNESS:   Patient evaluated, record reviewed, case discussed with staff.  Mrs. Crabtree is an 86 years old female with history of aortic aneurysm, chronic obstructive pulmonary disease disease and hip fracture who was previously hospitalized at [facility] with the diagnosis of acute urinary tract infection addressed with Omnicef.  She was subsequently admitted to this facility for PT, OT and skilled nursing care.    Since admission, patient remained in stable condition with no reported fever, chills or other acute systemic issues.  She however continues to have anxiety which is addressed with Valium.  Her appetite had improved with a gain of 4 pounds during the past 2 weeks.  She received a second maternal vaccine on July 16.  She continues to get in PT and OT.  During my visit, patient was comfortable and pleasantly denied new complaints.    PAST MEDICAL & SURGICAL HISTORY:   Past Medical History:   Diagnosis Date   • Asthma    • Cancer (CMS/HCC)     SKIN CANCER ON LEFT TEMPLE   • COPD (chronic obstructive pulmonary disease) (CMS/HCC)    • Hypertension    • Myocardial infarction (CMS/HCC)    • Stroke (CMS/HCC)       Past Surgical History:   Procedure Laterality Date   • ANKLE OPEN REDUCTION INTERNAL FIXATION     • COLON SURGERY      • EXCISION LESION WITH SPLIT THICKNESS SKIN GRAFT Left 4/28/2017    Procedure: EXCISION LESION WITH SPLIT THICKNESS SKIN GRAFT LEFT TEMPLE;  Surgeon: Javier Aguayo MD;  Location: [facility];  Service:    • KNEE ARTHROSCOPY     • LEG SURGERY           MEDICATIONS:  I have reviewed and reconciled the patients medication list in the patients chart at the skilled nursing facility today.      ALLERGIES:  Allergies   Allergen Reactions   • Iodine Swelling      REDNESS  AND HAD ITCHING         REVIEW OF SYSTEMS:  Fatigue, arthralgias, myalgias    All other systems were reviewed and are negative.    PHYSICAL EXAMINATION:   Vital signs: /76, HR 74/min, RR 18/min, temp 98 °F, O2 sat 97% on room air    Physical Exam    General Appearance:  Frail, chronically ill appearing, cachectic   Head and neck:  Atraumatic and normocephalic, without obvious abnormality.   Eyes:          PERRLA, conjunctivae and sclerae normal, no Icterus. No pallor. Extraocular movements are within normal limits.   Lungs:   Chest shape is normal. BS heard bilaterally equally.  No crackles or wheezing. No Pleural rub or bronchial breathing.     Heart:  Normal S1 and S2, no murmur, no gallop, no rub. No JVD.   Abdomen:   Normal BS, no masses, no organomegaly. Soft, nontender, nondistended, no guarding, no rebound tenderness.     Extremities: Moves all extremities well, no edema, no cyanosis, no clubbing.  Pulses palpable and equal bilaterally.     Musculoskeletal: Deconditioning noted.     Skin: No bleeding, bruising or rash.     Neurologic:  Psychiatric:                           Moves all four limbs equally. No tremors. No facial asymmetry.   Calm affect.       RECORDS REVIEW:   I have reviewed available medical records.     ASSESSMENT   • Failure to thrive   • Chronic essential hypertension, controlled  • Chronic obstructive pulmonary disease, stable  • Chronic hypoxic respiratory failure, stable at present  • Resolved acute cystitis      PLAN  · Continue current management as documented per record  · Aspiration and fall precautions  · Nutritional support and hydration  · Bowel hygiene  · Skin care, strict offloading and pressure relief  · Consultant follow as clinically indicated [x]  · Patient will be monitored closely, further plans were modified accordingly    [x]  Discussed Patient in detail with nursing/staff, addressed all needs today.     [x]  Plan of Care Reviewed   [x]  PT/OT Reviewed   []  Order Changes  [x]  Discharge Plans Reviewed  [x]  POA on file with Nursing Home.    [x]  Code Status listed on patients chart at the nursing home facility.       Shyanne Parsons MD.  7/21/2021

## 2022-03-04 ENCOUNTER — HOSPITAL ENCOUNTER (OUTPATIENT)
Facility: HOSPITAL | Age: 87
Discharge: HOME | End: 2022-03-04
Payer: MEDICARE

## 2022-03-04 PROCEDURE — 87186 SC STD MICRODIL/AGAR DIL: CPT

## 2022-03-04 PROCEDURE — 87086 URINE CULTURE/COLONY COUNT: CPT

## 2022-03-04 PROCEDURE — 87077 CULTURE AEROBIC IDENTIFY: CPT

## 2022-03-07 LAB
ORGANISM: ABNORMAL
URINE CULTURE, ROUTINE: ABNORMAL

## 2022-03-29 ENCOUNTER — APPOINTMENT (OUTPATIENT)
Dept: GENERAL RADIOLOGY | Facility: HOSPITAL | Age: 87
End: 2022-03-29
Payer: MEDICARE

## 2022-03-29 ENCOUNTER — HOSPITAL ENCOUNTER (EMERGENCY)
Facility: HOSPITAL | Age: 87
Discharge: HOME | End: 2022-03-29
Attending: EMERGENCY MEDICINE
Payer: MEDICARE

## 2022-03-29 VITALS
TEMPERATURE: 98.4 F | RESPIRATION RATE: 17 BRPM | DIASTOLIC BLOOD PRESSURE: 80 MMHG | OXYGEN SATURATION: 99 % | HEART RATE: 76 BPM | WEIGHT: 100 LBS | SYSTOLIC BLOOD PRESSURE: 161 MMHG | BODY MASS INDEX: 19.63 KG/M2 | HEIGHT: 60 IN

## 2022-03-29 DIAGNOSIS — G89.29 CHRONIC PAIN OF RIGHT HIP: Primary | ICD-10-CM

## 2022-03-29 DIAGNOSIS — M25.551 CHRONIC PAIN OF RIGHT HIP: Primary | ICD-10-CM

## 2022-03-29 DIAGNOSIS — J44.9 CHRONIC OBSTRUCTIVE PULMONARY DISEASE, UNSPECIFIED COPD TYPE (HCC): ICD-10-CM

## 2022-03-29 LAB
ANION GAP SERPL CALCULATED.3IONS-SCNC: 12 MMOL/L (ref 3–16)
BASE EXCESS ARTERIAL: 6.5 MMOL/L (ref -3–3)
BASOPHILS ABSOLUTE: 0 K/UL (ref 0–0.1)
BASOPHILS RELATIVE PERCENT: 0.5 %
BUN BLDV-MCNC: 6 MG/DL (ref 6–20)
CALCIUM SERPL-MCNC: 9.3 MG/DL (ref 8.5–10.5)
CHLORIDE BLD-SCNC: 98 MMOL/L (ref 98–107)
CO2: 30 MMOL/L (ref 20–30)
CREAT SERPL-MCNC: 0.6 MG/DL (ref 0.4–1.2)
EOSINOPHILS ABSOLUTE: 0 K/UL (ref 0–0.4)
EOSINOPHILS RELATIVE PERCENT: 0.2 %
FIO2: 0.28 %
GFR AFRICAN AMERICAN: >59
GFR NON-AFRICAN AMERICAN: >60
GLUCOSE BLD-MCNC: 101 MG/DL (ref 74–106)
HCO3 ARTERIAL: 31.4 MMOL/L (ref 22–26)
HCT VFR BLD CALC: 38.1 % (ref 37–47)
HEMOGLOBIN: 12.1 G/DL (ref 11.5–16.5)
IMMATURE GRANULOCYTES #: 0 K/UL
IMMATURE GRANULOCYTES %: 0.2 % (ref 0–5)
LYMPHOCYTES ABSOLUTE: 1 K/UL (ref 1.5–4)
LYMPHOCYTES RELATIVE PERCENT: 15 %
MCH RBC QN AUTO: 28.9 PG (ref 27–32)
MCHC RBC AUTO-ENTMCNC: 31.8 G/DL (ref 31–35)
MCV RBC AUTO: 91.1 FL (ref 80–100)
MONOCYTES ABSOLUTE: 0.4 K/UL (ref 0.2–0.8)
MONOCYTES RELATIVE PERCENT: 6.1 %
NEUTROPHILS ABSOLUTE: 5.2 K/UL (ref 2–7.5)
NEUTROPHILS RELATIVE PERCENT: 78 %
O2 SAT, ARTERIAL: 99 %
O2 THERAPY: ABNORMAL
PCO2 ARTERIAL: 46.3 MMHG (ref 35–45)
PDW BLD-RTO: 13 % (ref 11–16)
PH ARTERIAL: 7.45 (ref 7.35–7.45)
PLATELET # BLD: 244 K/UL (ref 150–400)
PMV BLD AUTO: 10.7 FL (ref 6–10)
PO2 ARTERIAL: 140.9 MMHG (ref 80–100)
POTASSIUM REFLEX MAGNESIUM: 4 MMOL/L (ref 3.4–5.1)
RBC # BLD: 4.18 M/UL (ref 3.8–5.8)
SARS-COV-2, NAAT: NOT DETECTED
SODIUM BLD-SCNC: 140 MMOL/L (ref 136–145)
TCO2 ARTERIAL: 32.8 MMOL/L (ref 24–30)
TROPONIN: <0.3 NG/ML
WBC # BLD: 6.6 K/UL (ref 4–11)

## 2022-03-29 PROCEDURE — 36415 COLL VENOUS BLD VENIPUNCTURE: CPT

## 2022-03-29 PROCEDURE — 85025 COMPLETE CBC W/AUTO DIFF WBC: CPT

## 2022-03-29 PROCEDURE — 93005 ELECTROCARDIOGRAM TRACING: CPT

## 2022-03-29 PROCEDURE — 80048 BASIC METABOLIC PNL TOTAL CA: CPT

## 2022-03-29 PROCEDURE — 87635 SARS-COV-2 COVID-19 AMP PRB: CPT

## 2022-03-29 PROCEDURE — 84484 ASSAY OF TROPONIN QUANT: CPT

## 2022-03-29 PROCEDURE — 6360000002 HC RX W HCPCS: Performed by: EMERGENCY MEDICINE

## 2022-03-29 PROCEDURE — 96374 THER/PROPH/DIAG INJ IV PUSH: CPT

## 2022-03-29 PROCEDURE — 36600 WITHDRAWAL OF ARTERIAL BLOOD: CPT

## 2022-03-29 PROCEDURE — 73502 X-RAY EXAM HIP UNI 2-3 VIEWS: CPT

## 2022-03-29 PROCEDURE — 6370000000 HC RX 637 (ALT 250 FOR IP): Performed by: EMERGENCY MEDICINE

## 2022-03-29 PROCEDURE — 71045 X-RAY EXAM CHEST 1 VIEW: CPT

## 2022-03-29 PROCEDURE — 82803 BLOOD GASES ANY COMBINATION: CPT

## 2022-03-29 PROCEDURE — 94640 AIRWAY INHALATION TREATMENT: CPT

## 2022-03-29 PROCEDURE — 99284 EMERGENCY DEPT VISIT MOD MDM: CPT

## 2022-03-29 RX ORDER — METHYLPREDNISOLONE SODIUM SUCCINATE 125 MG/2ML
125 INJECTION, POWDER, LYOPHILIZED, FOR SOLUTION INTRAMUSCULAR; INTRAVENOUS ONCE
Status: COMPLETED | OUTPATIENT
Start: 2022-03-29 | End: 2022-03-29

## 2022-03-29 RX ORDER — HYDROCODONE BITARTRATE AND ACETAMINOPHEN 5; 325 MG/1; MG/1
2 TABLET ORAL ONCE
Status: COMPLETED | OUTPATIENT
Start: 2022-03-29 | End: 2022-03-29

## 2022-03-29 RX ORDER — IPRATROPIUM BROMIDE AND ALBUTEROL SULFATE 2.5; .5 MG/3ML; MG/3ML
1 SOLUTION RESPIRATORY (INHALATION)
Status: DISCONTINUED | OUTPATIENT
Start: 2022-03-29 | End: 2022-03-29 | Stop reason: HOSPADM

## 2022-03-29 RX ADMIN — METHYLPREDNISOLONE SODIUM SUCCINATE 125 MG: 125 INJECTION, POWDER, FOR SOLUTION INTRAMUSCULAR; INTRAVENOUS at 10:32

## 2022-03-29 RX ADMIN — IPRATROPIUM BROMIDE AND ALBUTEROL SULFATE 1 AMPULE: 2.5; .5 SOLUTION RESPIRATORY (INHALATION) at 11:04

## 2022-03-29 RX ADMIN — HYDROCODONE BITARTRATE AND ACETAMINOPHEN 2 TABLET: 5; 325 TABLET ORAL at 10:39

## 2022-03-29 ASSESSMENT — PAIN SCALES - GENERAL
PAINLEVEL_OUTOF10: 9
PAINLEVEL_OUTOF10: 5
PAINLEVEL_OUTOF10: 3

## 2022-03-29 ASSESSMENT — PAIN DESCRIPTION - PAIN TYPE
TYPE: CHRONIC PAIN
TYPE: ACUTE PAIN;CHRONIC PAIN

## 2022-03-29 ASSESSMENT — PAIN - FUNCTIONAL ASSESSMENT
PAIN_FUNCTIONAL_ASSESSMENT: 0-10
PAIN_FUNCTIONAL_ASSESSMENT: 0-10

## 2022-03-29 ASSESSMENT — PAIN DESCRIPTION - LOCATION
LOCATION: HIP;LEG
LOCATION: HIP

## 2022-03-29 ASSESSMENT — PAIN DESCRIPTION - ORIENTATION
ORIENTATION: RIGHT
ORIENTATION: RIGHT;LEFT

## 2022-03-29 ASSESSMENT — PAIN DESCRIPTION - FREQUENCY: FREQUENCY: CONTINUOUS

## 2022-03-29 ASSESSMENT — PAIN DESCRIPTION - DESCRIPTORS
DESCRIPTORS: ACHING
DESCRIPTORS: ACHING

## 2022-03-29 NOTE — ED PROVIDER NOTES
[facility] ENCOUNTER      Pt Name: Solo Swan  MRN: [id number]  YOB: 1935  Date of evaluation: 3/29/2022  Provider: Lindsay Paget, [address] [place] [address]       Chief Complaint   Patient presents with    Cough    Hip Pain         HISTORY OF PRESENT ILLNESS  (Location/Symptom, Timing/Onset, Context/Setting, Quality, Duration, Modifying Factors, Severity.)   Solo Swan is a 82 y.o. female who presents to the emergency department with a chief complaint of right hip pain without trauma and cough and shortness of breath. Patient states she believes her COPD is acting up. Cough nonproductive. Patient denies fever. Patient states she wears oxygen at home. Patient states she wants a prescription for few Lortabs as well. Nursing notes were reviewed. REVIEW OFSYSTEMS    (2-9 systems for level 4, 10 or more for level 5)   ROS:  General:  No fevers, no chills, no weakness  Cardiovascular:  No chest pain, no palpitations  Respiratory:  +shortness of breath, +cough, +wheezing  Gastrointestinal:  No pain, no nausea, no vomiting, no diarrhea  Musculoskeletal:  No muscle pain, right hip pain  Skin:  No rash, no easy bruising  Neurologic:  No speech problems, no headache, no extremity weakness  Psychiatric:  No anxiety  Genitourinary:  No dysuria, no hematuria    Except as noted above the remainder of the review of systems was reviewed and negative.        PAST MEDICAL HISTORY     Past Medical History:   Diagnosis Date    Abdominal aortic aneurysm (HCC)     Anxiety     Aortic aneurysm, thoracic (HCC)     Ascending    Arthritis     CAD (coronary artery disease)     COPD (chronic obstructive pulmonary disease) (HCC)     CVD (cerebrovascular disease)     Hip fx ([facility] 2005    Hip pain     Hyperlipidemia     Lung nodule 08/2020    \"spot on her lung\" unknown if it is cancer    Tobacco abuse     Unspecified cerebral artery occlusion with cerebral infarction          SURGICAL HISTORY       Past Surgical History:   Procedure Laterality Date    COLECTOMY      Benign Tumor    KNEE SURGERY Left 2017    UPPER GASTROINTESTINAL ENDOSCOPY N/A 2020    EGD BIOPSY performed by Mikey Cisse MD at [address] [facility]       Previous Medications    ALBUTEROL (PROVENTIL HFA;VENTOLIN HFA) 108 (90 BASE) MCG/ACT INHALER    Inhale 2 puffs into the lungs every 6 hours as needed for Wheezing. CLOPIDOGREL (PLAVIX) 75 MG TABLET    Take 1 tablet by mouth daily. DIAZEPAM (VALIUM) 5 MG TABLET    Take 5 mg by mouth nightly. FLUTICASONE FUROATE-VILANTEROL (BREO ELLIPTA) 200-25 MCG/INH AEPB    Inhale 1 puff into the lungs daily    GABAPENTIN (NEURONTIN) 800 MG TABLET    Take 1 tablet by mouth 3 times daily. HYDROCODONE-ACETAMINOPHEN (NORCO)  MG PER TABLET    Take 1 tablet by mouth 3 times daily as needed for Pain. LISINOPRIL (PRINIVIL;ZESTRIL) 5 MG TABLET    Take 5 mg by mouth daily    OXYGEN    Inhale 2 L into the lungs daily     SIMVASTATIN (ZOCOR) 20 MG TABLET    Take 1 tablet by mouth nightly       ALLERGIES     Iodine    FAMILY HISTORY     History reviewed. No pertinent family history. SOCIAL HISTORY       Social History     Socioeconomic History    Marital status:       Spouse name: None    Number of children: None    Years of education: None    Highest education level: None   Occupational History    None   Tobacco Use    Smoking status: Former Smoker     Packs/day: 0.50     Years: 50.00     Pack years: 25.00     Types: Cigarettes     Quit date: 2015     Years since quittin.1    Smokeless tobacco: Never Used   Substance and Sexual Activity    Alcohol use: No    Drug use: No    Sexual activity: None   Other Topics Concern    None   Social History Narrative    None     Social Determinants of Health     Financial Resource Strain:     Difficulty of Paying Living Expenses: Not on file   Food Insecurity:     Worried About Running Out of Food in the Last Year: Not on file    Otilio of Food in the Last Year: Not on file   Transportation Needs:     Lack of Transportation (Medical): Not on file    Lack of Transportation (Non-Medical): Not on file   Physical Activity:     Days of Exercise per Week: Not on file    Minutes of Exercise per Session: Not on file   Stress:     Feeling of Stress : Not on file   Social Connections:     Frequency of Communication with Friends and Family: Not on file    Frequency of Social Gatherings with Friends and Family: Not on file    Attends Christian Services: Not on file    Active Member of [address] [place] [address] [employer] or Organizations: Not on file    Attends Club or Organization Meetings: Not on file    Marital Status: Not on file   Intimate Partner Violence:     Fear of Current or Ex-Partner: Not on file    Emotionally Abused: Not on file    Physically Abused: Not on file    Sexually Abused: Not on file   Housing Stability:     Unable to Pay for Housing in the Last Year: Not on file    Number of [place] in the Last Year: Not on file    Unstable Housing in the Last Year: Not on file         PHYSICAL EXAM    (up to 7 for level 4, 8 or more for level 5)     ED Triage Vitals [03/29/22 0948]   BP Temp Temp Source Pulse Resp SpO2 Height Weight   (!) 148/86 98.4 °F (36.9 °C) Oral 74 20 99 % 5' (1.524 m) 100 lb (45.4 kg)       Physical Exam  General :Patient is awake, alert, oriented, in no acute distress, nontoxic appearing  HEENT: Pupils are equally round and reactive to light, EOMI, conjunctivae clear. Oral mucosa is moist, no exudate. Uvula is midline  Neck: Neck is supple, full range of motion, trachea midline  Cardiac: Heart regular rate, rhythm, no murmurs, rubs, or gallops  Lungs: Scattered wheezing. Increased expiratory phase. Breath sounds equal but decreased bilaterally. . There is mild use of accessory muscles. Abdomen: Abdomen is soft, nontender, nondistended. There is no firm or pulsatile masses, no rebound rigidity or guarding. Musculoskeletal: 5 out of 5 strength in all 4 extremities. No focal muscle deficits are appreciated. Mild tenderness palpation of right hip  Neuro: Motor intact, sensory intact, level of consciousness is normal, cerebellar function is normal, reflexes are grossly normal. No evidence of incontinence or loss of bowel or bladder function, no saddle anesthesia noted   Dermatology: Skin is warm and dry  Psych: Mentation is grossly normal, cognition is grossly normal. Affect is appropriate. DIAGNOSTIC RESULTS     EKG: All EKG's are interpreted by the Emergency Department Physician who either signs or Co-signs this chart in the absenceof a cardiologist.    The EKG interpreted by me shows normal sinus rhythm rate of 77 short IA interval normal QT interval right axis normal ST segments. RADIOLOGY:   Non-plain film images such as CT, Ultrasound and MRI are read by the radiologist. Plain radiographic images are visualized and preliminarily interpreted by the emergency physician with the below findings:      ? Radiologist's Report Reviewed:  XR HIP 2-3 VW W PELVIS RIGHT   Final Result      No gross fracture of the right hip, but the right femoral neck is not adequately imaged on the submitted radiographs. CT is recommended for further characterization if there is concern for acute fracture. XR CHEST PORTABLE   Final Result      No evidence for acute cardiopulmonary disease.                    ED BEDSIDE ULTRASOUND:   Performed by ED Physician - none    LABS:    I have reviewed and interpreted all of the currently available lab results from this visit (ifapplicable):  Results for orders placed or performed during the hospital encounter of 03/29/22   COVID-19, Rapid    Specimen: Nasopharyngeal Swab   Result Value Ref Range    SARS-CoV-2, NAAT Not Detected Not Detected   CBC with Auto Differential   Result Value Ref Range    WBC 6.6 4.0 - 11.0 K/uL    RBC 4.18 3.80 - 5.80 M/uL    Hemoglobin 12.1 11.5 - 16.5 g/dL    Hematocrit 38.1 37.0 - 47.0 %    MCV 91.1 80.0 - 100.0 fL    MCH 28.9 27.0 - 32.0 pg    MCHC 31.8 31.0 - 35.0 g/dL    RDW 13.0 11.0 - 16.0 %    Platelets 397 812 - 534 K/uL    MPV 10.7 (H) 6.0 - 10.0 fL    Neutrophils % 78.0 %    Immature Granulocytes % 0.2 0.0 - 5.0 %    Lymphocytes % 15.0 %    Monocytes % 6.1 %    Eosinophils % 0.2 %    Basophils % 0.5 %    Neutrophils Absolute 5.2 2.0 - 7.5 K/uL    Immature Granulocytes # 0.0 K/uL    Lymphocytes Absolute 1.0 (L) 1.5 - 4.0 K/uL    Monocytes Absolute 0.4 0.2 - 0.8 K/uL    Eosinophils Absolute 0.0 0.0 - 0.4 K/uL    Basophils Absolute 0.0 0.0 - 0.1 K/uL   Basic Metabolic Panel w/ Reflex to MG   Result Value Ref Range    Sodium 140 136 - 145 mmol/L    Potassium reflex Magnesium 4.0 3.4 - 5.1 mmol/L    Chloride 98 98 - 107 mmol/L    CO2 30 20 - 30 mmol/L    Anion Gap 12 3 - 16    Glucose 101 74 - 106 mg/dL    BUN 6 6 - 20 mg/dL    CREATININE 0.6 0.4 - 1.2 mg/dL    GFR Non-African American >60 >59    GFR African American >59 >59    Calcium 9.3 8.5 - 10.5 mg/dL   Troponin   Result Value Ref Range    Troponin <0.30 <0.30 ng/mL   Blood Gas, Arterial   Result Value Ref Range    pH, Arterial 7.449 7.350 - 7.450    pCO2, Arterial 46.3 (H) 35.0 - 45.0 mmHg    pO2, Arterial 140.9 (H) 80.0 - 100.0 mmHg    HCO3, Arterial 31.4 (H) 22.0 - 26.0 mmol/L    Base Excess, Arterial 6.5 (H) -3.0 - 3.0 mmol/L    O2 Sat, Arterial 99.0 >92 %    TCO2, Arterial 32.8 (H) 24.0 - 30.0 mmol/L    O2 Therapy Unknown     FIO2 0.28 Not Established %        All other labs were within normal range or not returned as of this dictation.     EMERGENCY DEPARTMENT COURSE and DIFFERENTIAL DIAGNOSIS/MDM:   Vitals:    Vitals:    03/29/22 0948   BP: (!) 148/86   Pulse: 74   Resp: 20   Temp: 98.4 °F (36.9 °C)   TempSrc: Oral   SpO2: 99%   Weight: 100 lb (45.4 kg)   Height: 5' (1.524 m)       MEDICATIONS ADMINISTERED IN ED:  Medications ipratropium-albuterol (DUONEB) nebulizer solution 1 ampule (1 ampule Inhalation Given 3/29/22 1104)   methylPREDNISolone sodium (SOLU-MEDROL) injection 125 mg (125 mg IntraVENous Given 3/29/22 1032)   HYDROcodone-acetaminophen (NORCO) 5-325 MG per tablet 2 tablet (2 tablets Oral Given 3/29/22 1039)     Patient has no trauma to hip. Hip x-ray as well as chest x-ray are unremarkable. CBC is normal.  Patient's ABG demonstrates a high oxygen normal pH and CO2 of 46.3. Troponin and BMP are both normal.  Patient was prescribed 90 Norco tens on 3 2 as well as 5 mg diazepam and gabapentin. She has requested a prescription for Norco.  I explained to her that she needs to get her pain medication and nerve medication refilled by her family doctor. I did give her a dose of Norco in the emergency department. This time is felt the patient is stable for outpatient management therefore she will be discharged home. The patient will follow-up with their PCP in 1-2 days for reevaluation. If the patient or family members have anyfurther concerns or any worsening symptoms they will return to the ED for reevaluation. CONSULTS:  None    PROCEDURES:  Procedures    CRITICAL CARE TIME    Total Critical Care time was 0 minutes, excluding separately reportable procedures. There was a high probability of clinically significant/life threatening deterioration in the patient's condition which required my urgent intervention. FINAL IMPRESSION      1. Chronic pain of right hip    2. Chronic obstructive pulmonary disease, unspecified COPD type [facility]          DISPOSITION/PLAN   DISPOSITION        PATIENT REFERRED TO:  TOMER Avila - [address]  [phone]    In 2 days      [facility]  [facility]   [facility]  [phone]    As needed, If symptoms worsen      DISCHARGE MEDICATIONS:  New Prescriptions    No medications on file       Comment: Please note this report has been produced using speech recognition software and may contain errorsrelated to that system including errors in grammar, punctuation, and spelling, as well as words and phrases that may be inappropriate. If there are any questions or concerns please feel free to contact the dictating providerfor clarification.     Cheri Griffin DO  Attending Emergency Physician              Cheri Griffin DO  03/29/22 7196

## 2022-03-29 NOTE — ED NOTES
Ems report 1935  ? pna and l hip pain.   Pt was 94% on room air hx [address], RN  03/29/22 4595

## 2022-03-29 NOTE — ED NOTES
I spoke with lisbet at [address] [id number].   She will fax pt med list     Tammy Lazo RN  03/29/22 4435

## 2022-03-29 NOTE — ED NOTES
Reviewed discharge plan with Betty Pat and her daughter. Encouraged her to f/u with TOMER Amador CNP and she understood. NAD noted on discharge, gait steady.      Electronically signed by Dylan Strange RN on 3/29/2022 at 11:51 AM     Dylan Strange RN  03/29/22 8234

## 2022-05-25 ENCOUNTER — HOSPITAL ENCOUNTER (OUTPATIENT)
Facility: HOSPITAL | Age: 87
Discharge: HOME | End: 2022-05-25
Payer: MEDICARE

## 2022-05-25 LAB
BACTERIA: ABNORMAL /HPF
BILIRUBIN URINE: NEGATIVE
BLOOD, URINE: ABNORMAL
CLARITY: CLEAR
COLOR: YELLOW
EPITHELIAL CELLS, UA: ABNORMAL /HPF (ref 0–5)
GLUCOSE URINE: NEGATIVE MG/DL
KETONES, URINE: NEGATIVE MG/DL
LEUKOCYTE ESTERASE, URINE: ABNORMAL
MICROSCOPIC EXAMINATION: YES
NITRITE, URINE: POSITIVE
PH UA: 6 (ref 5–8)
PROTEIN UA: NEGATIVE MG/DL
RBC UA: ABNORMAL /HPF (ref 0–4)
SPECIFIC GRAVITY UA: <=1.005 (ref 1–1.03)
URINE REFLEX TO CULTURE: YES
URINE TYPE: ABNORMAL
UROBILINOGEN, URINE: 0.2 E.U./DL
WBC UA: ABNORMAL /HPF (ref 0–5)

## 2022-05-25 PROCEDURE — 87077 CULTURE AEROBIC IDENTIFY: CPT

## 2022-05-25 PROCEDURE — 81001 URINALYSIS AUTO W/SCOPE: CPT

## 2022-05-25 PROCEDURE — 87186 SC STD MICRODIL/AGAR DIL: CPT

## 2022-05-25 PROCEDURE — 87086 URINE CULTURE/COLONY COUNT: CPT

## 2022-05-29 LAB
ORGANISM: ABNORMAL
URINE CULTURE, ROUTINE: ABNORMAL

## 2022-06-14 ENCOUNTER — DOCUMENTATION (OUTPATIENT)
Dept: FAMILY MEDICINE CLINIC | Facility: CLINIC | Age: 87
End: 2022-06-14

## 2022-06-14 RX ORDER — ALBUTEROL SULFATE 90 UG/1
2 AEROSOL, METERED RESPIRATORY (INHALATION) EVERY 6 HOURS PRN
COMMUNITY

## 2022-06-14 RX ORDER — DOCUSATE SODIUM 100 MG/1
100 CAPSULE, LIQUID FILLED ORAL 2 TIMES DAILY
COMMUNITY

## 2022-06-14 RX ORDER — ACETAMINOPHEN 500 MG
500 TABLET ORAL EVERY 8 HOURS PRN
COMMUNITY

## 2022-06-14 RX ORDER — ONDANSETRON 4 MG/1
4 TABLET, FILM COATED ORAL EVERY 4 HOURS PRN
COMMUNITY

## 2022-06-15 ENCOUNTER — NURSING HOME (OUTPATIENT)
Dept: FAMILY MEDICINE CLINIC | Facility: CLINIC | Age: 87
End: 2022-06-15

## 2022-06-15 DIAGNOSIS — Z74.09 IMPAIRED MOBILITY AND ADLS: Primary | ICD-10-CM

## 2022-06-15 DIAGNOSIS — J44.9 CHRONIC OBSTRUCTIVE BRONCHITIS: ICD-10-CM

## 2022-06-15 DIAGNOSIS — J44.1 ACUTE EXACERBATION OF CHRONIC OBSTRUCTIVE BRONCHITIS: ICD-10-CM

## 2022-06-15 DIAGNOSIS — Z78.9 IMPAIRED MOBILITY AND ADLS: Primary | ICD-10-CM

## 2022-06-15 DIAGNOSIS — I25.10 ATHEROSCLEROSIS OF NATIVE CORONARY ARTERY OF NATIVE HEART WITHOUT ANGINA PECTORIS: ICD-10-CM

## 2022-06-15 DIAGNOSIS — K21.9 GERD WITHOUT ESOPHAGITIS: ICD-10-CM

## 2022-06-15 DIAGNOSIS — I10 ESSENTIAL HYPERTENSION: ICD-10-CM

## 2022-06-15 DIAGNOSIS — R54 AGE-RELATED PHYSICAL DEBILITY: ICD-10-CM

## 2022-06-15 DIAGNOSIS — J96.11 CHRONIC RESPIRATORY FAILURE WITH HYPOXIA: ICD-10-CM

## 2022-06-15 PROCEDURE — 99309 SBSQ NF CARE MODERATE MDM 30: CPT | Performed by: FAMILY MEDICINE

## 2022-06-15 NOTE — PROGRESS NOTES
Nursing Home Progress Note        Abe Montanez DO [x]  TOME[facility] Parra []  [address] [place] [address]  Phone: [phone]  Fax: [phone] Shyanne Parsons MD []  Jamal Medrano DO []  [address] [place] [address]  Phone: [phone]  Fax: [phone]     PATIENT NAME: Jennifer Crabtree                                                                          YOB: 1935           DATE OF SERVICE: 06/15/2022  FACILITY: []  [place]  [] [place]  []  [facility]  [] [facility]  [x]  [facility] ______________________________________________________________________     CHIEF COMPLAINT:  Impaired mobility and ADLs/physical debility of age related nature/chronic obstructive bronchitis/chronic respiratory failure with hypoxia/coronary atherosclerotic disease/GERD/hypertension      HISTORY OF PRESENT ILLNESS:   [x]  Follow Up visit for coordination of long term care issues and chronic medical management of Diagnoses and all orders for this visit:    1. Impaired mobility and ADLs (Primary)    2. Age-related physical debility    3. Atherosclerosis of native coronary artery of native heart without angina pectoris    4. Chronic respiratory failure with hypoxia (HCC)    5. Chronic obstructive bronchitis (HCC)    6. GERD without esophagitis    7. Essential hypertension    8. Acute exacerbation of chronic obstructive bronchitis (HCC)    Nursing/staff reports no falls or injuries.  She has had some worsening upper airway congestion, cough productive of large amount of green/yellow phlegm at times.  No reports of hemoptysis.  No reports of any focal aspiration.    Patient is tolerating all p.o. intake, no reports of any nutritional/hydration deficits.    Patient denies any active chest pain.     No acute behavioral changes.              PAST MEDICAL & SURGICAL HISTORY:   Past Medical History:   Diagnosis Date   • Asthma    • Cancer (CMS/HCC)     SKIN CANCER ON LEFT TEMPLE   • COPD  (chronic obstructive pulmonary disease) (CMS/HCC)    • Hypertension    • Myocardial infarction (CMS/HCC)    • Stroke (CMS/HCC)       Past Surgical History:   Procedure Laterality Date   • ANKLE OPEN REDUCTION INTERNAL FIXATION     • COLON SURGERY     • EXCISION LESION WITH SPLIT THICKNESS SKIN GRAFT Left 4/28/2017    Procedure: EXCISION LESION WITH SPLIT THICKNESS SKIN GRAFT LEFT TEMPLE;  Surgeon: Javier Aguayo MD;  Location: [facility];  Service:    • KNEE ARTHROSCOPY     • LEG SURGERY           MEDICATIONS:  I have reviewed and reconciled the patients medication list in the patients chart at the skilled nursing facility today.      ALLERGIES:    Allergies   Allergen Reactions   • Iodine Swelling      REDNESS  AND HAD ITCHING          SOCIAL HISTORY:    Social History     Socioeconomic History   • Marital status: Single   Tobacco Use   • Smoking status: Former Smoker     Years: 50.00   • Smokeless tobacco: Never Used   • Tobacco comment: QUIT 2 YEARS AGO- States she smokes 1-2 cigarettes a day   Vaping Use   • Vaping Use: Never used   Substance and Sexual Activity   • Alcohol use: No     Comment: HX OF ALCOHOL ABUSE   • Drug use: No   • Sexual activity: Defer       FAMILY HISTORY:    Family History   Problem Relation Age of Onset   • Cancer Brother         prostate   • Cancer Mother    • No Known Problems Father        REVIEW OF SYSTEMS:    Review of Systems  · Appetite: Fair []   Good [x]   Poor []   Weight Loss []  [x]  Weight Stable   Unavoidable Weight Loss []  Tolerating Tube Feeding []    Supplements Provided []   · Constitutional: Negative for fever, chills, diaphoresis or fatigue and weight change.   · HENT: No dysphagia; no changes to vision/hearing/smell/taste; no epistaxis  · Eyes: Negative for redness and visual disturbance.   · Respiratory: Cough productive of green/yellow phlegm production, increased frequency of wheezing.  No hemoptysis.  · Cardiovascular: Negative for chest pain and palpitations.    · Gastrointestinal: Negative for abdominal distention, abdominal pain and blood in stool.   · Endocrine: Negative for cold intolerance and heat intolerance.   · Genitourinary: Negative for difficulty urinating, dysuria and frequency.Negative for hematuria   · Musculoskeletal: Chronic myalgias and arthralgias  · Integumentary: No open wounds, rash or concerning skin lesions  · Neurological: Negative for syncope; generalized weakness.  · Hematological: Negative for adenopathy. Does not bruise/bleed easily.   · Immunological: Negative for reported allergies or immunological disorders  · Psychological: No acute behavioral changes    PHYSICAL EXAMINATION:   VITAL SIGNS: There were no vitals filed for this visit.    Physical Exam    General Appearance:  [x]  Alert   [x]  Oriented x person  [x]  No acute distress     []  Confused  []  Disoriented   []  Comatose   Head:  Atraumatic and normocephalic, without obvious abnormality.   Eyes:         PERRLA, conjunctivae and sclerae normal, no Icterus. No pallor. Extra-occular movements are within normal limits.   Ears:  Ears appear intact with no abnormalities noted.   Throat: No oral lesions, no thrush, oral mucosa moist.   Neck: Supple, trachea midline, no thyromegaly, no carotid bruit.   Back:   No kyphoscoliosis. No tenderness to palpation.   Lungs:   Chest shape is normal.  Audible air exchange noted all lung fields.  Trace end expiratory wheezes.  Prolonged expiratory phase bilaterally.  Rhonchus referred upper airway congestion is cleared partially with heavy cough.  No increased work of breathing.   Heart:  Normal S1 and S2, no murmur, no gallop, no rub. No JVD.   Abdomen:   Normal bowel sounds, no masses, no organomegaly. Soft, non-tender, non-distended, no guarding    Extremities: Moves all extremities.  Without edema, cyanosis or clubbing.    Thin, frail build.  Poor core strength and stability.   Pulses: Pulses palpable and equal bilaterally.   Skin: No bleeding or  rash.  Generalized dry skin noted.  Age-related atrophy of skin.   Neurologic: [x] Normal speech []  Normal mental status    [x] Cranial nerves II through XII intact   [x]  No anosmia [x]  DTR 2+ [x]  Proprioception intact  [x]  No focal motor/sensory deficits      Psych/Mood:                    [x]  No acute changes []  Depressed  Urinary:                            []  Continent  []  Incontinent []  Retention  []  F/C      []  UTI w/treatment in progress         ASSESSMENT     Diagnoses and all orders for this visit:    1. Impaired mobility and ADLs (Primary)    2. Age-related physical debility    3. Atherosclerosis of native coronary artery of native heart without angina pectoris    4. Chronic respiratory failure with hypoxia (HCC)    5. Chronic obstructive bronchitis (HCC)    6. GERD without esophagitis    7. Essential hypertension    8. Acute exacerbation of chronic obstructive bronchitis (HCC)          PLAN  Plan continuation of supportive care for mobilization/transfer assistance.  Continue to follow her overall nutritional/hydration status.  No acute behavioral changes noted at this time.    Continue dietary/lifestyle modifications to aid in symptom management of her chronic GERD.  Does not appear to have aspirated.    Vital signs demonstrate hemodynamic stability, blood pressure is at goal.    Demonstrates no findings of unstable angina at this time.    Planned treatment of her acute exacerbation of chronic obstructive bronchitis with a 12-day dexamethasone taper, 3 mg x 2 days, 2.5 mg x 2 days, 2 mg daily x2 days, 1.5 mg daily x2 days, 1 mg daily x2 days, 0.5 mg daily x2 days, then stop.  She will be given 40 mg IM injection of Depo-Medrol today.  She will also be treated with a 5-day course of cefdinir 300 mg twice daily.  Continue other respiratory care as already established.    [x]  Discussed Patient in detail with nursing/staff, addressed all needs today.     [x]  Plan of Care Reviewed   []  PT/OT  Reviewed   []  Order Changes  []  Discharge Plans Reviewed   []  Code Status Changes     I spent 35 minutes caring for [place] on this date of service. This time includes time spent by me in the following activities:preparing for the visit, reviewing tests, performing a medically appropriate examination and/or evaluation , counseling and educating the patient/family/caregiver, ordering medications, tests, or procedures, documenting information in the medical record and care coordination]       Abe Montanez DO  06/15/2022

## 2022-07-08 ENCOUNTER — HOSPITAL ENCOUNTER (OUTPATIENT)
Facility: HOSPITAL | Age: 87
Discharge: HOME | End: 2022-07-08
Payer: MEDICARE

## 2022-07-08 LAB
BACTERIA: ABNORMAL /HPF
BILIRUBIN URINE: NEGATIVE
BLOOD, URINE: ABNORMAL
CLARITY: ABNORMAL
COLOR: YELLOW
EPITHELIAL CELLS, UA: ABNORMAL /HPF (ref 0–5)
GLUCOSE URINE: NEGATIVE MG/DL
KETONES, URINE: NEGATIVE MG/DL
LEUKOCYTE ESTERASE, URINE: ABNORMAL
MICROSCOPIC EXAMINATION: YES
NITRITE, URINE: POSITIVE
PH UA: 6 (ref 5–8)
PROTEIN UA: NEGATIVE MG/DL
RBC UA: ABNORMAL /HPF (ref 0–4)
SPECIFIC GRAVITY UA: 1.01 (ref 1–1.03)
URINE TYPE: ABNORMAL
UROBILINOGEN, URINE: 0.2 E.U./DL
WBC UA: ABNORMAL /HPF (ref 0–5)

## 2022-07-08 PROCEDURE — 81001 URINALYSIS AUTO W/SCOPE: CPT

## 2022-09-21 ENCOUNTER — NURSING HOME (OUTPATIENT)
Dept: FAMILY MEDICINE CLINIC | Facility: CLINIC | Age: 87
End: 2022-09-21

## 2022-09-21 VITALS
SYSTOLIC BLOOD PRESSURE: 127 MMHG | OXYGEN SATURATION: 98 % | BODY MASS INDEX: 18.67 KG/M2 | DIASTOLIC BLOOD PRESSURE: 69 MMHG | WEIGHT: 112.2 LBS | TEMPERATURE: 97.8 F | RESPIRATION RATE: 18 BRPM | HEART RATE: 78 BPM

## 2022-09-21 DIAGNOSIS — J44.9 CHRONIC OBSTRUCTIVE BRONCHITIS: ICD-10-CM

## 2022-09-21 DIAGNOSIS — J96.11 CHRONIC RESPIRATORY FAILURE WITH HYPOXIA: ICD-10-CM

## 2022-09-21 DIAGNOSIS — Z74.09 IMPAIRED MOBILITY AND ADLS: Primary | ICD-10-CM

## 2022-09-21 DIAGNOSIS — R54 AGE-RELATED PHYSICAL DEBILITY: ICD-10-CM

## 2022-09-21 DIAGNOSIS — K21.9 GERD WITHOUT ESOPHAGITIS: ICD-10-CM

## 2022-09-21 DIAGNOSIS — I10 ESSENTIAL HYPERTENSION: ICD-10-CM

## 2022-09-21 DIAGNOSIS — I25.10 ATHEROSCLEROSIS OF NATIVE CORONARY ARTERY OF NATIVE HEART WITHOUT ANGINA PECTORIS: ICD-10-CM

## 2022-09-21 DIAGNOSIS — Z78.9 IMPAIRED MOBILITY AND ADLS: Primary | ICD-10-CM

## 2022-09-21 PROCEDURE — 99309 SBSQ NF CARE MODERATE MDM 30: CPT | Performed by: FAMILY MEDICINE

## 2022-09-22 ENCOUNTER — HOSPITAL ENCOUNTER (OUTPATIENT)
Facility: HOSPITAL | Age: 87
Discharge: HOME | End: 2022-09-22
Payer: MEDICARE

## 2022-09-22 LAB
A/G RATIO: 1.5 (ref 0.8–2)
ALBUMIN SERPL-MCNC: 4.3 G/DL (ref 3.4–4.8)
ALP BLD-CCNC: 117 U/L (ref 25–100)
ALT SERPL-CCNC: 9 U/L (ref 4–36)
ANION GAP SERPL CALCULATED.3IONS-SCNC: 9 MMOL/L (ref 3–16)
AST SERPL-CCNC: 15 U/L (ref 8–33)
BACTERIA: ABNORMAL /HPF
BILIRUB SERPL-MCNC: 0.3 MG/DL (ref 0.3–1.2)
BILIRUBIN URINE: NEGATIVE
BLOOD, URINE: ABNORMAL
BUN BLDV-MCNC: 8 MG/DL (ref 6–20)
CALCIUM SERPL-MCNC: 9.3 MG/DL (ref 8.5–10.5)
CHLORIDE BLD-SCNC: 98 MMOL/L (ref 98–107)
CLARITY: CLEAR
CO2: 33 MMOL/L (ref 20–30)
COLOR: YELLOW
CREAT SERPL-MCNC: 0.7 MG/DL (ref 0.4–1.2)
GFR AFRICAN AMERICAN: >59
GFR NON-AFRICAN AMERICAN: >60
GLOBULIN: 2.8 G/DL
GLUCOSE BLD-MCNC: 86 MG/DL (ref 74–106)
GLUCOSE URINE: NEGATIVE MG/DL
KETONES, URINE: NEGATIVE MG/DL
LEUKOCYTE ESTERASE, URINE: ABNORMAL
MICROSCOPIC EXAMINATION: YES
NITRITE, URINE: NEGATIVE
PH UA: 7.5 (ref 5–8)
POTASSIUM REFLEX MAGNESIUM: 4 MMOL/L (ref 3.4–5.1)
PROTEIN UA: NEGATIVE MG/DL
RBC UA: ABNORMAL /HPF (ref 0–4)
SODIUM BLD-SCNC: 140 MMOL/L (ref 136–145)
SPECIFIC GRAVITY UA: 1.01 (ref 1–1.03)
TOTAL PROTEIN: 7.1 G/DL (ref 6.4–8.3)
URINE REFLEX TO CULTURE: YES
URINE TYPE: ABNORMAL
UROBILINOGEN, URINE: 0.2 E.U./DL
WBC UA: ABNORMAL /HPF (ref 0–5)

## 2022-09-22 PROCEDURE — 87086 URINE CULTURE/COLONY COUNT: CPT

## 2022-09-22 PROCEDURE — 80053 COMPREHEN METABOLIC PANEL: CPT

## 2022-09-22 PROCEDURE — 87077 CULTURE AEROBIC IDENTIFY: CPT

## 2022-09-22 PROCEDURE — 87186 SC STD MICRODIL/AGAR DIL: CPT

## 2022-09-22 PROCEDURE — 81001 URINALYSIS AUTO W/SCOPE: CPT

## 2022-09-22 PROCEDURE — 36415 COLL VENOUS BLD VENIPUNCTURE: CPT

## 2022-09-24 LAB
ORGANISM: ABNORMAL
ORGANISM: ABNORMAL
URINE CULTURE, ROUTINE: ABNORMAL
URINE CULTURE, ROUTINE: ABNORMAL

## 2022-11-23 NOTE — PROGRESS NOTES
Nursing Home Progress Note        Abe Montanez DO [x]  TOMEKA Parra []  [address] [facility] [facility], [place] [address]  Phone: [phone]  Fax: [phone] Shyanne Parsons MD []  Jamal Medrano DO []  [address] [place] [address]  Phone: [phone]  Fax: [phone]     PATIENT NAME: Jennifer Crabtree                                                                          YOB: 1935           DATE OF SERVICE: 9/21/2022  FACILITY: []  [facility]  [] [facility]  []  [facility] [place]  [] [facility]  [x]  [facility] ______________________________________________________________________     CHIEF COMPLAINT:  Impaired mobility and ADLs/physical debility of age related nature/chronic obstructive bronchitis/chronic respiratory failure with hypoxia/coronary atherosclerotic disease/GERD/hypertension      HISTORY OF PRESENT ILLNESS:   [x]  Follow Up visit for coordination of long term care issues and chronic medical management of Diagnoses and all orders for this visit:    1. Impaired mobility and ADLs (Primary)    2. Age-related physical debility    3. Chronic obstructive bronchitis (HCC)    4. Chronic respiratory failure with hypoxia (HCC)    5. Atherosclerosis of native coronary artery of native heart without angina pectoris    6. Essential hypertension    7. GERD without esophagitis      Nursing/staff reports the patient remains receptive to supportive care for mobilization/transfer assistance, as well as ADLs of knee.  No reports of any nutritional/hydration deficits.    Vital signs have been stable.    Demonstrates no increased work of breathing, no exacerbation of her underlying chronic respiratory failure since last visit.    No reports of any focal aspiration.    No acute behavioral changes, patient is sleeping well.            PAST MEDICAL & SURGICAL HISTORY:   Past Medical History:   Diagnosis Date   • Asthma    • Cancer (HCC)     SKIN CANCER ON LEFT TEMPLE   • COPD (chronic obstructive  pulmonary disease) (HCC)    • Hypertension    • Myocardial infarction (HCC)    • Stroke (HCC)       Past Surgical History:   Procedure Laterality Date   • ANKLE OPEN REDUCTION INTERNAL FIXATION     • COLON SURGERY     • EXCISION LESION WITH SPLIT THICKNESS SKIN GRAFT Left 4/28/2017    Procedure: EXCISION LESION WITH SPLIT THICKNESS SKIN GRAFT LEFT TEMPLE;  Surgeon: Javier Aguayo MD;  Location: [facility];  Service:    • KNEE ARTHROSCOPY     • LEG SURGERY           MEDICATIONS:  I have reviewed and reconciled the patients medication list in the patients chart at the skilled nursing facility today.      ALLERGIES:    Allergies   Allergen Reactions   • Iodine Swelling      REDNESS  AND HAD ITCHING          SOCIAL HISTORY:    Social History     Socioeconomic History   • Marital status: Single   Tobacco Use   • Smoking status: Former     Years: 50.00     Types: Cigarettes   • Smokeless tobacco: Never   • Tobacco comments:     QUIT 2 YEARS AGO- States she smokes 1-2 cigarettes a day   Vaping Use   • Vaping Use: Never used   Substance and Sexual Activity   • Alcohol use: No     Comment: HX OF ALCOHOL ABUSE   • Drug use: No   • Sexual activity: Defer       FAMILY HISTORY:    Family History   Problem Relation Age of Onset   • Cancer Brother         prostate   • Cancer Mother    • No Known Problems Father        REVIEW OF SYSTEMS:    Review of Systems  · Appetite: Fair []   Good [x]   Poor []   Weight Loss []  [x]  Weight Stable   Unavoidable Weight Loss []  Tolerating Tube Feeding []    Supplements Provided []   · Constitutional: Negative for fever, chills, diaphoresis or fatigue and weight change.   · HENT: No dysphagia; no changes to vision/hearing/smell/taste; no epistaxis  · Eyes: Negative for redness and visual disturbance.   · Respiratory: Cough productive of green/yellow phlegm production, increased frequency of wheezing.  No hemoptysis.  · Cardiovascular: Negative for chest pain and palpitations.    · Gastrointestinal: Negative for abdominal distention, abdominal pain and blood in stool.   · Endocrine: Negative for cold intolerance and heat intolerance.   · Genitourinary: Negative for difficulty urinating, dysuria and frequency.Negative for hematuria   · Musculoskeletal: Chronic myalgias and arthralgias  · Integumentary: No open wounds, rash or concerning skin lesions  · Neurological: Negative for syncope; generalized weakness.  · Hematological: Negative for adenopathy. Does not bruise/bleed easily.   · Immunological: Negative for reported allergies or immunological disorders  · Psychological: No acute behavioral changes    PHYSICAL EXAMINATION:   VITAL SIGNS:   Vitals:    09/21/22 0840   BP: 127/69   Pulse: 78   Resp: 18   Temp: 97.8 °F (36.6 °C)   SpO2: 98%       Physical Exam    General Appearance:  [x]  Alert   [x]  Oriented x person  [x]  No acute distress     []  Confused  []  Disoriented   []  Comatose   Head:  Atraumatic and normocephalic, without obvious abnormality.   Eyes:         PERRLA, conjunctivae and sclerae normal, no Icterus. No pallor. Extra-occular movements are within normal limits.   Ears:  Ears appear intact with no abnormalities noted.   Throat: No oral lesions, no thrush, oral mucosa moist.   Neck: Supple, trachea midline, no thyromegaly, no carotid bruit.   Back:   No kyphoscoliosis. No tenderness to palpation.   Lungs:   Chest shape is normal.  Audible air exchange noted all lung fields.  Trace end expiratory wheezes.  Prolonged expiratory phase bilaterally.  Rhonchus referred upper airway congestion is cleared partially with heavy cough.  No increased work of breathing.   Heart:  Normal S1 and S2, no murmur, no gallop, no rub. No JVD.   Abdomen:   Normal bowel sounds, no masses, no organomegaly. Soft, non-tender, non-distended, no guarding    Extremities: Moves all extremities.  Without edema, cyanosis or clubbing.    Thin, frail build.  Poor core strength and stability.   Pulses:  Pulses palpable and equal bilaterally.   Skin: No bleeding or rash.  Generalized dry skin noted.  Age-related atrophy of skin.   Neurologic: [x] Normal speech []  Normal mental status    [x] Cranial nerves II through XII intact   [x]  No anosmia [x]  DTR 2+ [x]  Proprioception intact  [x]  No focal motor/sensory deficits      Psych/Mood:                    [x]  No acute changes []  Depressed  Urinary:                            []  Continent  []  Incontinent []  Retention  []  F/C      []  UTI w/treatment in progress         ASSESSMENT     Diagnoses and all orders for this visit:    1. Impaired mobility and ADLs (Primary)    2. Age-related physical debility    3. Chronic obstructive bronchitis (HCC)    4. Chronic respiratory failure with hypoxia (HCC)    5. Atherosclerosis of native coronary artery of native heart without angina pectoris    6. Essential hypertension    7. GERD without esophagitis          PLAN  Plan continuation of supportive care for any mobilization/transfer assistance; fall precautions in place.  Continue to follow her overall nutritional/hydration status.    Demonstrates no acute exacerbation of her underlying lung disease.  Continue oxygen supplementation as well as other respiratory therapies.    Demonstrates no findings of unstable angina, vital signs are stable; blood pressure is at goal.    Continue dietary/lifestyle modifications to aid in symptom management of her chronic GERD.    No acute behavioral changes.  Sleeping well.    Surveillance labs when needed.    [x]  Discussed Patient in detail with nursing/staff, addressed all needs today.     [x]  Plan of Care Reviewed   []  PT/OT Reviewed   []  Order Changes  []  Discharge Plans Reviewed   []  Code Status Changes     I spent 30 minutes caring for Jennifer on this date of service. This time includes time spent by me in the following activities:preparing for the visit, performing a medically appropriate examination and/or evaluation ,  counseling and educating the patient/family/caregiver, ordering medications, tests, or procedures, documenting information in the medical record and care coordination    I have reviewed and updated all copied forward information, as appropriate.  I attest to the accuracy and relevance of any unchanged information.       Abe Montanez DO  9/21/2022

## 2023-06-06 ENCOUNTER — HOSPITAL ENCOUNTER (EMERGENCY)
Facility: HOSPITAL | Age: 88
Discharge: HOME | End: 2023-06-06
Attending: STUDENT IN AN ORGANIZED HEALTH CARE EDUCATION/TRAINING PROGRAM
Payer: MEDICARE

## 2023-06-06 ENCOUNTER — APPOINTMENT (OUTPATIENT)
Dept: GENERAL RADIOLOGY | Facility: HOSPITAL | Age: 88
End: 2023-06-06
Payer: MEDICARE

## 2023-06-06 VITALS
OXYGEN SATURATION: 95 % | WEIGHT: 110 LBS | HEIGHT: 65 IN | HEART RATE: 70 BPM | BODY MASS INDEX: 18.33 KG/M2 | RESPIRATION RATE: 18 BRPM | TEMPERATURE: 96.6 F | SYSTOLIC BLOOD PRESSURE: 117 MMHG | DIASTOLIC BLOOD PRESSURE: 71 MMHG

## 2023-06-06 DIAGNOSIS — S62.101A CLOSED FRACTURE OF RIGHT WRIST, INITIAL ENCOUNTER: Primary | ICD-10-CM

## 2023-06-06 DIAGNOSIS — S52.501A CLOSED FRACTURE OF DISTAL END OF RIGHT RADIUS, UNSPECIFIED FRACTURE MORPHOLOGY, INITIAL ENCOUNTER: ICD-10-CM

## 2023-06-06 LAB
ALBUMIN SERPL-MCNC: 3.6 G/DL (ref 3.5–5.2)
ALBUMIN/GLOB SERPL: 1.1 G/DL
ALP SERPL-CCNC: 160 U/L (ref 39–117)
ALT SERPL W P-5'-P-CCNC: 8 U/L (ref 1–33)
ANION GAP SERPL CALCULATED.3IONS-SCNC: 11 MMOL/L (ref 5–15)
AST SERPL-CCNC: 13 U/L (ref 1–32)
BASOPHILS # BLD AUTO: 0.03 10*3/MM3 (ref 0–0.2)
BASOPHILS NFR BLD AUTO: 0.5 % (ref 0–1.5)
BILIRUB SERPL-MCNC: 0.2 MG/DL (ref 0–1.2)
BUN SERPL-MCNC: 11 MG/DL (ref 8–23)
BUN/CREAT SERPL: 18 (ref 7–25)
CALCIUM SPEC-SCNC: 8.9 MG/DL (ref 8.6–10.5)
CHLORIDE SERPL-SCNC: 99 MMOL/L (ref 98–107)
CK MB SERPL-CCNC: 1.32 NG/ML
CK SERPL-CCNC: 54 U/L (ref 20–180)
CO2 SERPL-SCNC: 27 MMOL/L (ref 22–29)
CREAT SERPL-MCNC: 0.61 MG/DL (ref 0.57–1)
DEPRECATED RDW RBC AUTO: 52.2 FL (ref 37–54)
EGFRCR SERPLBLD CKD-EPI 2021: 87.7 ML/MIN/1.73
EOSINOPHIL # BLD AUTO: 0.29 10*3/MM3 (ref 0–0.4)
EOSINOPHIL NFR BLD AUTO: 4.6 % (ref 0.3–6.2)
ERYTHROCYTE [DISTWIDTH] IN BLOOD BY AUTOMATED COUNT: 16.8 % (ref 12.3–15.4)
GLOBULIN UR ELPH-MCNC: 3.3 GM/DL
GLUCOSE SERPL-MCNC: 102 MG/DL (ref 65–99)
HCT VFR BLD AUTO: 34.5 % (ref 34–46.6)
HGB BLD-MCNC: 10.8 G/DL (ref 12–15.9)
IMM GRANULOCYTES # BLD AUTO: 0.04 10*3/MM3 (ref 0–0.05)
IMM GRANULOCYTES NFR BLD AUTO: 0.6 % (ref 0–0.5)
LYMPHOCYTES # BLD AUTO: 0.94 10*3/MM3 (ref 0.7–3.1)
LYMPHOCYTES NFR BLD AUTO: 14.9 % (ref 19.6–45.3)
MCH RBC QN AUTO: 26.5 PG (ref 26.6–33)
MCHC RBC AUTO-ENTMCNC: 31.3 G/DL (ref 31.5–35.7)
MCV RBC AUTO: 84.6 FL (ref 79–97)
MONOCYTES # BLD AUTO: 0.44 10*3/MM3 (ref 0.1–0.9)
MONOCYTES NFR BLD AUTO: 7 % (ref 5–12)
NEUTROPHILS NFR BLD AUTO: 4.55 10*3/MM3 (ref 1.7–7)
NEUTROPHILS NFR BLD AUTO: 72.4 % (ref 42.7–76)
NRBC BLD AUTO-RTO: 0 /100 WBC (ref 0–0.2)
PLATELET # BLD AUTO: 205 10*3/MM3 (ref 140–450)
PMV BLD AUTO: 10.2 FL (ref 6–12)
POTASSIUM SERPL-SCNC: 4 MMOL/L (ref 3.5–5.2)
PROT SERPL-MCNC: 6.9 G/DL (ref 6–8.5)
RBC # BLD AUTO: 4.08 10*6/MM3 (ref 3.77–5.28)
SODIUM SERPL-SCNC: 137 MMOL/L (ref 136–145)
TROPONIN T SERPL HS-MCNC: 9 NG/L
WBC NRBC COR # BLD: 6.29 10*3/MM3 (ref 3.4–10.8)

## 2023-06-06 PROCEDURE — 73110 X-RAY EXAM OF WRIST: CPT

## 2023-06-06 PROCEDURE — 80053 COMPREHEN METABOLIC PANEL: CPT | Performed by: PHYSICIAN ASSISTANT

## 2023-06-06 PROCEDURE — 99284 EMERGENCY DEPT VISIT MOD MDM: CPT

## 2023-06-06 PROCEDURE — 85025 COMPLETE CBC W/AUTO DIFF WBC: CPT | Performed by: PHYSICIAN ASSISTANT

## 2023-06-06 PROCEDURE — 0 LIDOCAINE 1 % SOLUTION: Performed by: PHYSICIAN ASSISTANT

## 2023-06-06 PROCEDURE — 73090 X-RAY EXAM OF FOREARM: CPT

## 2023-06-06 PROCEDURE — 82550 ASSAY OF CK (CPK): CPT | Performed by: PHYSICIAN ASSISTANT

## 2023-06-06 PROCEDURE — 93005 ELECTROCARDIOGRAM TRACING: CPT | Performed by: PHYSICIAN ASSISTANT

## 2023-06-06 PROCEDURE — 36415 COLL VENOUS BLD VENIPUNCTURE: CPT

## 2023-06-06 PROCEDURE — 82553 CREATINE MB FRACTION: CPT | Performed by: PHYSICIAN ASSISTANT

## 2023-06-06 PROCEDURE — 84484 ASSAY OF TROPONIN QUANT: CPT | Performed by: PHYSICIAN ASSISTANT

## 2023-06-06 RX ORDER — HYDROCODONE BITARTRATE AND ACETAMINOPHEN 5; 325 MG/1; MG/1
1 TABLET ORAL ONCE
Status: COMPLETED | OUTPATIENT
Start: 2023-06-06 | End: 2023-06-06

## 2023-06-06 RX ORDER — LIDOCAINE HYDROCHLORIDE 10 MG/ML
10 INJECTION, SOLUTION INFILTRATION; PERINEURAL ONCE
Status: COMPLETED | OUTPATIENT
Start: 2023-06-06 | End: 2023-06-06

## 2023-06-06 RX ADMIN — LIDOCAINE HYDROCHLORIDE 10 ML: 10 INJECTION, SOLUTION INFILTRATION; PERINEURAL at 16:52

## 2023-06-06 RX ADMIN — HYDROCODONE BITARTRATE AND ACETAMINOPHEN 1 TABLET: 5; 325 TABLET ORAL at 16:33

## 2023-06-06 NOTE — ED PROVIDER NOTES
Subjective  History of Present Illness:    Chief Complaint: Fall, right wrist injury  History of Present Illness: 87-year-old female presents as a fall, right wrist injury, she has a history of dementia and is a very poor historian, she states that she fell at home.  She arrived via EMS with a splint on.  Onset: Sudden onset  Duration: Just prior to arrival  Exacerbating / Alleviating factors: Pain is worse with movement, she arrived in a splint  Associated symptoms: Reported falls, and history of confusion      Nurses Notes reviewed and agree, including vitals, allergies, social history and prior medical history.     REVIEW OF SYSTEMS: All systems reviewed and not pertinent unless noted.    Review of Systems   Constitutional:         Fall   Musculoskeletal:         Right wrist injury   All other systems reviewed and are negative.    Past Medical History:   Diagnosis Date    Asthma     Cancer     SKIN CANCER ON LEFT TEMPLE    COPD (chronic obstructive pulmonary disease)     Hypertension     Myocardial infarction     Stroke        Allergies:    Iodine      Past Surgical History:   Procedure Laterality Date    ANKLE OPEN REDUCTION INTERNAL FIXATION      COLON SURGERY      EXCISION LESION WITH SPLIT THICKNESS SKIN GRAFT Left 4/28/2017    Procedure: EXCISION LESION WITH SPLIT THICKNESS SKIN GRAFT LEFT TEMPLE;  Surgeon: Javier Aguayo MD;  Location: [facility];  Service:     KNEE ARTHROSCOPY      LEG SURGERY           Social History     Socioeconomic History    Marital status: Single   Tobacco Use    Smoking status: Former     Years: 50.00     Types: Cigarettes    Smokeless tobacco: Never    Tobacco comments:     QUIT 2 YEARS AGO- States she smokes 1-2 cigarettes a day   Vaping Use    Vaping Use: Never used   Substance and Sexual Activity    Alcohol use: No     Comment: HX OF ALCOHOL ABUSE    Drug use: No    Sexual activity: Defer         Family History   Problem Relation Age of Onset    Cancer Brother         prostate  "   Cancer Mother     No Known Problems Father        Objective  Physical Exam:  /71   Pulse 70   Temp 96.6 °F (35.9 °C) (Axillary)   Resp 18   Ht 165.1 cm (65\")   Wt 49.9 kg (110 lb)   LMP  (LMP Unknown)   SpO2 95%   BMI 18.30 kg/m²      Physical Exam  Vitals and nursing note reviewed.   Constitutional:       Appearance: She is well-developed.   HENT:      Mouth/Throat:      Mouth: Mucous membranes are moist.   Cardiovascular:      Rate and Rhythm: Normal rate and regular rhythm.   Pulmonary:      Effort: Pulmonary effort is normal.      Breath sounds: Normal breath sounds.   Abdominal:      Palpations: Abdomen is soft.   Musculoskeletal:         General: Tenderness, deformity and signs of injury present.      Cervical back: Normal range of motion and neck supple.   Skin:     General: Skin is warm and dry.   Neurological:      Mental Status: She is alert.      Deep Tendon Reflexes: Reflexes are normal and symmetric.      Comments: Confusion         FX Dislocation    Date/Time: 6/6/2023 5:46 PM  Performed by: Jered Ward Jr., PA-C  Authorized by: Ren Larson MD     Consent:     Consent obtained:  Verbal    Consent given by:  Healthcare agent    Risks, benefits, and alternatives were discussed: yes      Risks discussed:  Nerve damage, pain and vascular damage  Universal protocol:     Patient identity confirmed:  Arm band  Injury:     Injury location:  Forearm    Forearm injury location:  R forearm    Forearm fracture type: distal radius and ulnar styloid    Pre-procedure details:     Distal neurologic exam:  Normal    Distal perfusion: distal pulses strong      Range of motion: reduced    Anesthesia:     Anesthesia method:  Local infiltration    Local anesthetic:  Lidocaine 1% w/o epi  Procedure details:     Manipulation performed: yes      Skin traction used: yes      Skeletal traction used: yes      Pin inserted: no      Reduction successful: yes      X-ray confirmed reduction: yes      " Immobilization:  Splint    Splint type:  Sugar tong    Supplies used:  Fiberglass    Attestation: Splint applied and adjusted personally by me    Post-procedure details:     Distal neurologic exam:  Normal    Distal perfusion: distal pulses strong      Range of motion: normal      Procedure completion:  Tolerated    1653    Closed reduction right distal radius fracture with dorsal angulation  Consent obtained, risks and benefits discussed patient elected to continue  Sterile prep with Betadine, 10 mL of lidocaine infiltrated dorsally with 27-gauge needle after aspiration of small amount of blood to confirm hematoma space    Traction and manipulation with sugar-tong splint placed post procedure, covered with Ace wrap.  Tolerated well without complication.    Postprocedure imaging reveals anatomical alignment.    Electronically signed by Ermias Barahona DO, 06/06/23, 4:53 PM EDT.    ED Course:    ED Course as of 06/08/23 1249   Tue Jun 06, 2023   1608 KG interpreted by me, normal sinus rhythm no concerning ST changes noted, rate of 72 [JE]   1723 Discussed with Dr. Okeefe patient can follow-up in the clinic [CS]      ED Course User Index  [CS] Jered Ward Jr., ANITRA  [JE] Ren Larson MD       Lab Results (last 24 hours)       ** No results found for the last 24 hours. **             XR Forearm 2 View Right    Result Date: 6/7/2023  CLINICAL INDICATION:  Fall  EXAMINATION TECHNIQUE: XR FOREARM 2 VW RIGHT-  COMPARISON: None.  FINDINGS: Acute, mildly comminuted, proximally impacted, mild proximal migration of the distal radial metaphyseal fracture with volar apex angulation and dorsal tilt of the distal radial articular surface. Acute, mildly comminuted distal ulnar metaphyseal fracture with proximal impaction and mild migration of the distal fracture fragment. There is disruption of the distal radioulnar joint. The fractures are extending intra-articularly into the radiocarpal joint and distal radioulnar  joints. There is dorsal tilt of the distal articular surface. Osseous demineralization. Partially imaged carpal rows are grossly intact. Vascular calcifications. Marked soft tissue swelling and deformity of the wrist.      Impression: Acute distal radial and ulnar fractures as above. Soft tissue swelling.  Images personally reviewed, interpreted and dictated by LORENZA Horne.       This report was signed and finalized on 6/7/2023 8:32 AM by LORENZA Horne.    XR Wrist 3+ View Right    Result Date: 6/7/2023  CLINICAL INDICATION:  post reduction  EXAMINATION TECHNIQUE: XR WRIST 3+ VW RIGHT-  COMPARISON: Radiograph 06/06/2023.  FINDINGS: Overlying fiberglass cast limits evaluation. Interval reduction of the distal radial and ulnar fractures with significantly improved osseous alignment. Osseous alignment is near-anatomic. Soft tissue swelling. Osseous demineralization.      Impression: Interval reduction of the distal radial and ulnar fractures with significantly improved osseous alignment. Osseous alignment is near-anatomic. Soft tissue swelling.    Images personally reviewed, interpreted and dictated by LORENZA Horne.       This report was signed and finalized on 6/7/2023 1:43 PM by LORENZA Horne.        Medical Decision Making  87-year-old female presents with a fall, with obvious deformity of her right forearm, differential includes fracture dislocation of distal radius, fracture of radius, fracture of ulna, dislocation of radius, ulna.  X-rays confirmed a fracture location of the distal radius and ulna    Problems Addressed:  Closed fracture of distal end of right radius, unspecified fracture morphology, initial encounter: complicated acute illness or injury  Closed fracture of right wrist, initial encounter: complicated acute illness or injury    Amount and/or Complexity of Data Reviewed  Labs: ordered.  Radiology: ordered and independent interpretation performed.  ECG/medicine  tests: ordered.    Risk  Prescription drug management.          Final diagnoses:   Closed fracture of right wrist, initial encounter   Closed fracture of distal end of right radius, unspecified fracture morphology, initial encounter          Jered Ward Jr., PA-C  06/08/23 1810

## 2023-06-06 NOTE — ED NOTES
Attempted to call report to [facility] and rehab [facility], no answer at this time.

## (undated) DEVICE — APPL CHLORAPREP 1 STEP 10.5ML

## (undated) DEVICE — SUT ETHLN 6/0 P1 18IN 697G

## (undated) DEVICE — DRSNG ADAPTIC 3X8

## (undated) DEVICE — SUT VIC 4/0 SH 27IN J415H

## (undated) DEVICE — GAUZE,SPONGE,FLUFF,6"X6.75",STRL,10/TRAY: Brand: MEDLINE

## (undated) DEVICE — FLEXIBLE YANKAUER,MEDIUM TIP, NO VACUUM CONTROL: Brand: ARGYLE

## (undated) DEVICE — SUT VIC 3/0 SH 27IN J416H

## (undated) DEVICE — SPNG GZ WOVN 4X4IN 12PLY 10/BX STRL

## (undated) DEVICE — SUT ETHLN 5/0 P3 18IN 698H

## (undated) DEVICE — STRIP,CLOSURE,WOUND,MEDI-STRIP,1/2X4: Brand: MEDLINE

## (undated) DEVICE — PK EXTRM UPPR 20

## (undated) DEVICE — SUT ETHLN 5/0 PS2 18IN 1666G

## (undated) DEVICE — FORCEP SPEC RETRV BX AD 2 MMX155 CM 5 MM GI OVL CUP W/ NDL

## (undated) DEVICE — BLD CLIP UNIV SURG GRY

## (undated) DEVICE — DRSNG WND BORDR/ADHS NONADHR/GZ LF 4X4IN STRL

## (undated) DEVICE — SUT GUT CHRM 4/0 RB1 27IN U203H

## (undated) DEVICE — UNDYED BRAIDED (POLYGLACTIN 910), SYNTHETIC ABSORBABLE SUTURE: Brand: COATED VICRYL